# Patient Record
Sex: FEMALE | Race: WHITE | Employment: OTHER | URBAN - NONMETROPOLITAN AREA
[De-identification: names, ages, dates, MRNs, and addresses within clinical notes are randomized per-mention and may not be internally consistent; named-entity substitution may affect disease eponyms.]

---

## 2017-01-11 ENCOUNTER — OFFICE VISIT (OUTPATIENT)
Dept: FAMILY MEDICINE CLINIC | Age: 82
End: 2017-01-11

## 2017-01-11 VITALS
RESPIRATION RATE: 16 BRPM | DIASTOLIC BLOOD PRESSURE: 74 MMHG | HEART RATE: 73 BPM | OXYGEN SATURATION: 96 % | SYSTOLIC BLOOD PRESSURE: 112 MMHG | BODY MASS INDEX: 29.49 KG/M2 | WEIGHT: 151 LBS

## 2017-01-11 DIAGNOSIS — J41.0 SIMPLE CHRONIC BRONCHITIS (HCC): ICD-10-CM

## 2017-01-11 DIAGNOSIS — G62.9 PERIPHERAL POLYNEUROPATHY: Chronic | ICD-10-CM

## 2017-01-11 DIAGNOSIS — M10.072 ACUTE IDIOPATHIC GOUT INVOLVING TOE OF LEFT FOOT: Primary | Chronic | ICD-10-CM

## 2017-01-11 DIAGNOSIS — E03.9 ACQUIRED HYPOTHYROIDISM: Chronic | ICD-10-CM

## 2017-01-11 DIAGNOSIS — I50.32 CHRONIC DIASTOLIC CONGESTIVE HEART FAILURE (HCC): Chronic | ICD-10-CM

## 2017-01-11 DIAGNOSIS — R26.81 UNSTABLE GAIT: ICD-10-CM

## 2017-01-11 DIAGNOSIS — L08.9 INFLAMMATION OF TOE: ICD-10-CM

## 2017-01-11 DIAGNOSIS — I10 ESSENTIAL HYPERTENSION: Chronic | ICD-10-CM

## 2017-01-11 DIAGNOSIS — F33.41 MAJOR DEPRESSIVE DISORDER, RECURRENT EPISODE, IN PARTIAL REMISSION (HCC): Chronic | ICD-10-CM

## 2017-01-11 LAB
A/G RATIO: 1.8 (ref 1.1–2.2)
ALBUMIN SERPL-MCNC: 4.4 G/DL (ref 3.4–5)
ALP BLD-CCNC: 93 U/L (ref 40–129)
ALT SERPL-CCNC: 10 U/L (ref 10–40)
ANION GAP SERPL CALCULATED.3IONS-SCNC: 16 MMOL/L (ref 3–16)
AST SERPL-CCNC: 16 U/L (ref 15–37)
BASOPHILS ABSOLUTE: 0.1 K/UL (ref 0–0.2)
BASOPHILS RELATIVE PERCENT: 0.9 %
BILIRUB SERPL-MCNC: <0.2 MG/DL (ref 0–1)
BUN BLDV-MCNC: 22 MG/DL (ref 7–20)
CALCIUM SERPL-MCNC: 9.7 MG/DL (ref 8.3–10.6)
CHLORIDE BLD-SCNC: 99 MMOL/L (ref 99–110)
CO2: 28 MMOL/L (ref 21–32)
CREAT SERPL-MCNC: 1 MG/DL (ref 0.6–1.2)
EOSINOPHILS ABSOLUTE: 0.2 K/UL (ref 0–0.6)
EOSINOPHILS RELATIVE PERCENT: 3 %
GFR AFRICAN AMERICAN: >60
GFR NON-AFRICAN AMERICAN: 52
GLOBULIN: 2.5 G/DL
GLUCOSE BLD-MCNC: 167 MG/DL (ref 70–99)
HCT VFR BLD CALC: 39.5 % (ref 36–48)
HEMOGLOBIN: 13 G/DL (ref 12–16)
LYMPHOCYTES ABSOLUTE: 1.5 K/UL (ref 1–5.1)
LYMPHOCYTES RELATIVE PERCENT: 22.4 %
MAGNESIUM: 1.7 MG/DL (ref 1.8–2.4)
MCH RBC QN AUTO: 27.8 PG (ref 26–34)
MCHC RBC AUTO-ENTMCNC: 33 G/DL (ref 31–36)
MCV RBC AUTO: 84.2 FL (ref 80–100)
MONOCYTES ABSOLUTE: 0.4 K/UL (ref 0–1.3)
MONOCYTES RELATIVE PERCENT: 6.5 %
NEUTROPHILS ABSOLUTE: 4.4 K/UL (ref 1.7–7.7)
NEUTROPHILS RELATIVE PERCENT: 67.2 %
PDW BLD-RTO: 15.6 % (ref 12.4–15.4)
PLATELET # BLD: 139 K/UL (ref 135–450)
PMV BLD AUTO: 9.8 FL (ref 5–10.5)
POTASSIUM SERPL-SCNC: 4.2 MMOL/L (ref 3.5–5.1)
RBC # BLD: 4.69 M/UL (ref 4–5.2)
SODIUM BLD-SCNC: 143 MMOL/L (ref 136–145)
TOTAL PROTEIN: 6.9 G/DL (ref 6.4–8.2)
TSH SERPL DL<=0.05 MIU/L-ACNC: 0.77 UIU/ML (ref 0.27–4.2)
URIC ACID, SERUM: 7.1 MG/DL (ref 2.6–6)
WBC # BLD: 6.5 K/UL (ref 4–11)

## 2017-01-11 PROCEDURE — 36415 COLL VENOUS BLD VENIPUNCTURE: CPT | Performed by: FAMILY MEDICINE

## 2017-01-11 PROCEDURE — 99214 OFFICE O/P EST MOD 30 MIN: CPT | Performed by: FAMILY MEDICINE

## 2017-01-11 RX ORDER — PREDNISONE 20 MG/1
TABLET ORAL
Qty: 30 TABLET | Refills: 0 | Status: SHIPPED | OUTPATIENT
Start: 2017-01-11 | End: 2017-07-19 | Stop reason: ALTCHOICE

## 2017-01-11 ASSESSMENT — ENCOUNTER SYMPTOMS
DIARRHEA: 0
ABDOMINAL PAIN: 0
BLOOD IN STOOL: 0
SINUS PRESSURE: 0
BACK PAIN: 0
COUGH: 0
CHEST TIGHTNESS: 0
WHEEZING: 0
SHORTNESS OF BREATH: 0
CONSTIPATION: 0
ALLERGIC/IMMUNOLOGIC NEGATIVE: 1
NAUSEA: 0
SORE THROAT: 0
RHINORRHEA: 0
ABDOMINAL DISTENTION: 0
VOMITING: 0

## 2017-01-16 RX ORDER — ALLOPURINOL 100 MG/1
100 TABLET ORAL DAILY
Qty: 30 TABLET | Refills: 3 | Status: SHIPPED | OUTPATIENT
Start: 2017-01-16 | End: 2017-05-31 | Stop reason: SDUPTHER

## 2017-01-18 ENCOUNTER — TELEPHONE (OUTPATIENT)
Dept: FAMILY MEDICINE CLINIC | Age: 82
End: 2017-01-18

## 2017-01-26 ENCOUNTER — TELEPHONE (OUTPATIENT)
Dept: FAMILY MEDICINE CLINIC | Age: 82
End: 2017-01-26

## 2017-01-26 DIAGNOSIS — R60.9 EDEMA, UNSPECIFIED TYPE: Primary | ICD-10-CM

## 2017-02-16 ENCOUNTER — CARE COORDINATION (OUTPATIENT)
Dept: CARE COORDINATION | Age: 82
End: 2017-02-16

## 2017-02-16 DIAGNOSIS — M10.079 IDIOPATHIC GOUT INVOLVING TOE, UNSPECIFIED CHRONICITY, UNSPECIFIED LATERALITY: ICD-10-CM

## 2017-02-16 DIAGNOSIS — M10.079 IDIOPATHIC GOUT INVOLVING TOE, UNSPECIFIED CHRONICITY, UNSPECIFIED LATERALITY: Primary | ICD-10-CM

## 2017-02-16 RX ORDER — METHYLPREDNISOLONE 4 MG/1
TABLET ORAL
Qty: 1 KIT | Refills: 0 | Status: SHIPPED | OUTPATIENT
Start: 2017-02-16 | End: 2017-02-16 | Stop reason: SDUPTHER

## 2017-02-16 RX ORDER — METHYLPREDNISOLONE 4 MG/1
TABLET ORAL
Qty: 1 KIT | Refills: 0 | Status: SHIPPED | OUTPATIENT
Start: 2017-02-16 | End: 2017-07-19 | Stop reason: ALTCHOICE

## 2017-03-01 ENCOUNTER — TELEPHONE (OUTPATIENT)
Dept: FAMILY MEDICINE CLINIC | Age: 82
End: 2017-03-01

## 2017-03-01 RX ORDER — MECLIZINE HCL 12.5 MG/1
TABLET ORAL
Qty: 30 TABLET | Refills: 2 | Status: SHIPPED | OUTPATIENT
Start: 2017-03-01 | End: 2017-08-09 | Stop reason: SDUPTHER

## 2017-03-01 RX ORDER — DOCUSATE SODIUM 100 MG/1
CAPSULE, LIQUID FILLED ORAL
Qty: 60 CAPSULE | Refills: 2 | Status: SHIPPED | OUTPATIENT
Start: 2017-03-01 | End: 2017-07-19 | Stop reason: ALTCHOICE

## 2017-03-07 ENCOUNTER — CARE COORDINATOR VISIT (OUTPATIENT)
Dept: CARE COORDINATION | Age: 82
End: 2017-03-07

## 2017-03-07 ENCOUNTER — OFFICE VISIT (OUTPATIENT)
Dept: FAMILY MEDICINE CLINIC | Age: 82
End: 2017-03-07

## 2017-03-07 VITALS
SYSTOLIC BLOOD PRESSURE: 122 MMHG | WEIGHT: 150 LBS | HEART RATE: 130 BPM | BODY MASS INDEX: 29.29 KG/M2 | DIASTOLIC BLOOD PRESSURE: 74 MMHG | RESPIRATION RATE: 18 BRPM

## 2017-03-07 DIAGNOSIS — J41.0 SIMPLE CHRONIC BRONCHITIS (HCC): ICD-10-CM

## 2017-03-07 DIAGNOSIS — G62.9 PERIPHERAL POLYNEUROPATHY: Chronic | ICD-10-CM

## 2017-03-07 DIAGNOSIS — F33.41 MAJOR DEPRESSIVE DISORDER, RECURRENT EPISODE, IN PARTIAL REMISSION (HCC): Chronic | ICD-10-CM

## 2017-03-07 DIAGNOSIS — S72.25XD CLOSED NONDISPLACED SUBTROCHANTERIC FRACTURE OF LEFT FEMUR WITH ROUTINE HEALING, SUBSEQUENT ENCOUNTER: ICD-10-CM

## 2017-03-07 DIAGNOSIS — K59.01 SLOW TRANSIT CONSTIPATION: ICD-10-CM

## 2017-03-07 DIAGNOSIS — E03.9 ACQUIRED HYPOTHYROIDISM: Chronic | ICD-10-CM

## 2017-03-07 DIAGNOSIS — R26.81 UNSTABLE GAIT: ICD-10-CM

## 2017-03-07 DIAGNOSIS — I50.32 CHRONIC DIASTOLIC CONGESTIVE HEART FAILURE (HCC): Chronic | ICD-10-CM

## 2017-03-07 PROCEDURE — G8420 CALC BMI NORM PARAMETERS: HCPCS | Performed by: FAMILY MEDICINE

## 2017-03-07 PROCEDURE — 3023F SPIROM DOC REV: CPT | Performed by: FAMILY MEDICINE

## 2017-03-07 PROCEDURE — 4040F PNEUMOC VAC/ADMIN/RCVD: CPT | Performed by: FAMILY MEDICINE

## 2017-03-07 PROCEDURE — 1123F ACP DISCUSS/DSCN MKR DOCD: CPT | Performed by: FAMILY MEDICINE

## 2017-03-07 PROCEDURE — 1036F TOBACCO NON-USER: CPT | Performed by: FAMILY MEDICINE

## 2017-03-07 PROCEDURE — 99214 OFFICE O/P EST MOD 30 MIN: CPT | Performed by: FAMILY MEDICINE

## 2017-03-07 PROCEDURE — G8484 FLU IMMUNIZE NO ADMIN: HCPCS | Performed by: FAMILY MEDICINE

## 2017-03-07 PROCEDURE — G8427 DOCREV CUR MEDS BY ELIG CLIN: HCPCS | Performed by: FAMILY MEDICINE

## 2017-03-07 PROCEDURE — G8926 SPIRO NO PERF OR DOC: HCPCS | Performed by: FAMILY MEDICINE

## 2017-03-07 PROCEDURE — 1090F PRES/ABSN URINE INCON ASSESS: CPT | Performed by: FAMILY MEDICINE

## 2017-03-07 ASSESSMENT — ENCOUNTER SYMPTOMS
ALLERGIC/IMMUNOLOGIC NEGATIVE: 1
ABDOMINAL PAIN: 0
WHEEZING: 0
CONSTIPATION: 0
COUGH: 0
SINUS PRESSURE: 0
SHORTNESS OF BREATH: 0
VOMITING: 0
NAUSEA: 0
BACK PAIN: 0
BLOOD IN STOOL: 0
RHINORRHEA: 0
CHEST TIGHTNESS: 0
SORE THROAT: 0
ABDOMINAL DISTENTION: 0
DIARRHEA: 0

## 2017-03-22 RX ORDER — OMEPRAZOLE 20 MG/1
CAPSULE, DELAYED RELEASE ORAL
Qty: 30 CAPSULE | Refills: 11 | Status: SHIPPED | OUTPATIENT
Start: 2017-03-22 | End: 2018-04-11 | Stop reason: SDUPTHER

## 2017-03-22 RX ORDER — INDAPAMIDE 2.5 MG/1
TABLET, FILM COATED ORAL
Qty: 90 TABLET | Refills: 1 | Status: SHIPPED | OUTPATIENT
Start: 2017-03-22 | End: 2017-12-27 | Stop reason: SDUPTHER

## 2017-03-22 RX ORDER — OMEPRAZOLE 20 MG/1
CAPSULE, DELAYED RELEASE ORAL
Qty: 30 CAPSULE | Refills: 5 | Status: SHIPPED | OUTPATIENT
Start: 2017-03-22 | End: 2017-07-19 | Stop reason: ALTCHOICE

## 2017-04-05 RX ORDER — LOSARTAN POTASSIUM 50 MG/1
TABLET ORAL
Qty: 30 TABLET | Refills: 11 | Status: SHIPPED | OUTPATIENT
Start: 2017-04-05 | End: 2018-05-02 | Stop reason: SDUPTHER

## 2017-05-03 RX ORDER — POTASSIUM CHLORIDE 750 MG/1
CAPSULE, EXTENDED RELEASE ORAL
Qty: 30 CAPSULE | Refills: 5 | Status: SHIPPED | OUTPATIENT
Start: 2017-05-03 | End: 2017-10-25 | Stop reason: SDUPTHER

## 2017-05-03 RX ORDER — TRAZODONE HYDROCHLORIDE 50 MG/1
TABLET ORAL
Qty: 30 TABLET | Refills: 11 | Status: SHIPPED | OUTPATIENT
Start: 2017-05-03 | End: 2018-05-02 | Stop reason: SDUPTHER

## 2017-05-22 ENCOUNTER — OFFICE VISIT (OUTPATIENT)
Dept: FAMILY MEDICINE CLINIC | Age: 82
End: 2017-05-22

## 2017-05-22 ENCOUNTER — CARE COORDINATOR VISIT (OUTPATIENT)
Dept: CARE COORDINATION | Age: 82
End: 2017-05-22

## 2017-05-22 VITALS
HEIGHT: 60 IN | WEIGHT: 155 LBS | SYSTOLIC BLOOD PRESSURE: 128 MMHG | BODY MASS INDEX: 30.43 KG/M2 | HEART RATE: 68 BPM | RESPIRATION RATE: 15 BRPM | DIASTOLIC BLOOD PRESSURE: 74 MMHG

## 2017-05-22 DIAGNOSIS — Z23 NEED FOR PROPHYLACTIC VACCINATION AND INOCULATION AGAINST VARICELLA: ICD-10-CM

## 2017-05-22 DIAGNOSIS — L73.9 FOLLICULITIS: Primary | ICD-10-CM

## 2017-05-22 DIAGNOSIS — Z23 NEED FOR PROPHYLACTIC VACCINATION AGAINST DIPHTHERIA-TETANUS-PERTUSSIS (DTP): ICD-10-CM

## 2017-05-22 PROCEDURE — 99213 OFFICE O/P EST LOW 20 MIN: CPT | Performed by: FAMILY MEDICINE

## 2017-05-22 RX ORDER — SULFAMETHOXAZOLE AND TRIMETHOPRIM 800; 160 MG/1; MG/1
1 TABLET ORAL 2 TIMES DAILY
Qty: 10 TABLET | Refills: 0 | Status: SHIPPED | OUTPATIENT
Start: 2017-05-22 | End: 2017-05-27

## 2017-05-22 ASSESSMENT — PATIENT HEALTH QUESTIONNAIRE - PHQ9
1. LITTLE INTEREST OR PLEASURE IN DOING THINGS: 0
2. FEELING DOWN, DEPRESSED OR HOPELESS: 0
SUM OF ALL RESPONSES TO PHQ9 QUESTIONS 1 & 2: 0
SUM OF ALL RESPONSES TO PHQ QUESTIONS 1-9: 0

## 2017-05-30 ENCOUNTER — OFFICE VISIT (OUTPATIENT)
Dept: FAMILY MEDICINE CLINIC | Age: 82
End: 2017-05-30

## 2017-05-30 VITALS
DIASTOLIC BLOOD PRESSURE: 80 MMHG | BODY MASS INDEX: 30.43 KG/M2 | HEART RATE: 60 BPM | RESPIRATION RATE: 18 BRPM | WEIGHT: 155 LBS | HEIGHT: 60 IN | OXYGEN SATURATION: 90 % | SYSTOLIC BLOOD PRESSURE: 122 MMHG

## 2017-05-30 DIAGNOSIS — L73.9 FOLLICULITIS: Primary | ICD-10-CM

## 2017-05-30 DIAGNOSIS — Z23 NEED FOR PROPHYLACTIC VACCINATION AGAINST DIPHTHERIA-TETANUS-PERTUSSIS (DTP): ICD-10-CM

## 2017-05-30 DIAGNOSIS — F43.21 GRIEF: ICD-10-CM

## 2017-05-30 DIAGNOSIS — Z23 NEED FOR PROPHYLACTIC VACCINATION AND INOCULATION AGAINST VARICELLA: ICD-10-CM

## 2017-05-30 PROCEDURE — 99214 OFFICE O/P EST MOD 30 MIN: CPT | Performed by: FAMILY MEDICINE

## 2017-05-30 ASSESSMENT — ENCOUNTER SYMPTOMS
CHEST TIGHTNESS: 0
BACK PAIN: 0
NAUSEA: 0
SORE THROAT: 0
CONSTIPATION: 0
DIARRHEA: 0
WHEEZING: 0
COUGH: 0
ABDOMINAL DISTENTION: 0
SINUS PRESSURE: 0
VOMITING: 0
BLOOD IN STOOL: 0
SHORTNESS OF BREATH: 0
RHINORRHEA: 0
ABDOMINAL PAIN: 0
ALLERGIC/IMMUNOLOGIC NEGATIVE: 1

## 2017-05-31 RX ORDER — ALLOPURINOL 100 MG/1
TABLET ORAL
Qty: 30 TABLET | Refills: 3 | Status: SHIPPED | OUTPATIENT
Start: 2017-05-31 | End: 2017-10-04 | Stop reason: SDUPTHER

## 2017-06-07 RX ORDER — LOVASTATIN 40 MG/1
TABLET ORAL
Qty: 90 TABLET | Refills: 3 | Status: SHIPPED | OUTPATIENT
Start: 2017-06-07 | End: 2018-06-13 | Stop reason: SDUPTHER

## 2017-06-21 RX ORDER — TRAMADOL HYDROCHLORIDE 50 MG/1
TABLET ORAL
Qty: 30 TABLET | Refills: 1 | Status: SHIPPED | OUTPATIENT
Start: 2017-06-21 | End: 2018-03-01 | Stop reason: CLARIF

## 2017-06-27 ENCOUNTER — OFFICE VISIT (OUTPATIENT)
Dept: FAMILY MEDICINE CLINIC | Age: 82
End: 2017-06-27

## 2017-06-27 VITALS
OXYGEN SATURATION: 97 % | BODY MASS INDEX: 30.43 KG/M2 | WEIGHT: 155 LBS | RESPIRATION RATE: 20 BRPM | HEIGHT: 60 IN | SYSTOLIC BLOOD PRESSURE: 120 MMHG | DIASTOLIC BLOOD PRESSURE: 82 MMHG | HEART RATE: 85 BPM

## 2017-06-27 DIAGNOSIS — R05.9 COUGH: ICD-10-CM

## 2017-06-27 DIAGNOSIS — L73.9 FOLLICULITIS: ICD-10-CM

## 2017-06-27 DIAGNOSIS — H65.03 BILATERAL ACUTE SEROUS OTITIS MEDIA, RECURRENCE NOT SPECIFIED: Primary | ICD-10-CM

## 2017-06-27 PROCEDURE — 99213 OFFICE O/P EST LOW 20 MIN: CPT | Performed by: FAMILY MEDICINE

## 2017-06-27 RX ORDER — MONTELUKAST SODIUM 10 MG/1
10 TABLET ORAL NIGHTLY
Qty: 30 TABLET | Refills: 3 | Status: SHIPPED | OUTPATIENT
Start: 2017-06-27 | End: 2017-10-18 | Stop reason: SDUPTHER

## 2017-06-27 ASSESSMENT — PATIENT HEALTH QUESTIONNAIRE - PHQ9
SUM OF ALL RESPONSES TO PHQ9 QUESTIONS 1 & 2: 0
1. LITTLE INTEREST OR PLEASURE IN DOING THINGS: 0
SUM OF ALL RESPONSES TO PHQ QUESTIONS 1-9: 0
2. FEELING DOWN, DEPRESSED OR HOPELESS: 0

## 2017-06-28 ENCOUNTER — TELEPHONE (OUTPATIENT)
Dept: FAMILY MEDICINE CLINIC | Age: 82
End: 2017-06-28

## 2017-06-28 DIAGNOSIS — R21 RASH: Primary | ICD-10-CM

## 2017-06-28 RX ORDER — MENTHOL AND ZINC OXIDE .44; 20.625 G/100G; G/100G
OINTMENT TOPICAL DAILY
Qty: 1 TUBE | Refills: 0 | Status: SHIPPED | OUTPATIENT
Start: 2017-06-28 | End: 2017-07-05

## 2017-07-03 PROBLEM — L73.9 FOLLICULITIS: Status: RESOLVED | Noted: 2017-05-30 | Resolved: 2017-07-03

## 2017-07-03 ASSESSMENT — ENCOUNTER SYMPTOMS
SINUS PRESSURE: 0
SORE THROAT: 0
CONSTIPATION: 0
CHEST TIGHTNESS: 0
DIARRHEA: 0
ALLERGIC/IMMUNOLOGIC NEGATIVE: 1
WHEEZING: 0
NAUSEA: 0
ABDOMINAL DISTENTION: 0
BACK PAIN: 0
SHORTNESS OF BREATH: 0
ABDOMINAL PAIN: 0
COUGH: 0
RHINORRHEA: 0
VOMITING: 0
BLOOD IN STOOL: 0

## 2017-07-18 ENCOUNTER — CARE COORDINATION (OUTPATIENT)
Dept: CARE COORDINATION | Age: 82
End: 2017-07-18

## 2017-07-19 ENCOUNTER — OFFICE VISIT (OUTPATIENT)
Dept: FAMILY MEDICINE CLINIC | Age: 82
End: 2017-07-19

## 2017-07-19 VITALS
HEIGHT: 60 IN | BODY MASS INDEX: 30.23 KG/M2 | HEART RATE: 69 BPM | DIASTOLIC BLOOD PRESSURE: 74 MMHG | OXYGEN SATURATION: 94 % | SYSTOLIC BLOOD PRESSURE: 112 MMHG | WEIGHT: 154 LBS | RESPIRATION RATE: 20 BRPM

## 2017-07-19 DIAGNOSIS — E79.0 HYPERURICEMIA: ICD-10-CM

## 2017-07-19 DIAGNOSIS — I50.32 CHRONIC DIASTOLIC CONGESTIVE HEART FAILURE (HCC): Chronic | ICD-10-CM

## 2017-07-19 DIAGNOSIS — E03.9 ACQUIRED HYPOTHYROIDISM: Chronic | ICD-10-CM

## 2017-07-19 DIAGNOSIS — N18.30 CKD (CHRONIC KIDNEY DISEASE), STAGE III (HCC): ICD-10-CM

## 2017-07-19 DIAGNOSIS — R06.02 SHORTNESS OF BREATH: ICD-10-CM

## 2017-07-19 DIAGNOSIS — R60.9 DEPENDENT EDEMA: ICD-10-CM

## 2017-07-19 DIAGNOSIS — R53.83 FATIGUE, UNSPECIFIED TYPE: Primary | ICD-10-CM

## 2017-07-19 DIAGNOSIS — I10 ESSENTIAL HYPERTENSION: Chronic | ICD-10-CM

## 2017-07-19 DIAGNOSIS — R53.83 FATIGUE, UNSPECIFIED TYPE: ICD-10-CM

## 2017-07-19 DIAGNOSIS — R14.0 BLOATED ABDOMEN: ICD-10-CM

## 2017-07-19 PROBLEM — F43.21 GRIEF: Status: RESOLVED | Noted: 2017-05-30 | Resolved: 2017-07-19

## 2017-07-19 PROBLEM — R05.9 COUGH: Status: RESOLVED | Noted: 2017-06-27 | Resolved: 2017-07-19

## 2017-07-19 PROBLEM — H65.03 BILATERAL ACUTE SEROUS OTITIS MEDIA: Status: RESOLVED | Noted: 2017-06-27 | Resolved: 2017-07-19

## 2017-07-19 LAB
ALBUMIN SERPL-MCNC: 4.5 G/DL (ref 3.4–5)
ALP BLD-CCNC: 75 U/L (ref 40–129)
ALT SERPL-CCNC: 16 U/L (ref 10–40)
ANION GAP SERPL CALCULATED.3IONS-SCNC: 18 MMOL/L (ref 3–16)
AST SERPL-CCNC: 23 U/L (ref 15–37)
BASOPHILS ABSOLUTE: 0.1 K/UL (ref 0–0.2)
BASOPHILS RELATIVE PERCENT: 0.9 %
BILIRUB SERPL-MCNC: 0.3 MG/DL (ref 0–1)
BILIRUBIN DIRECT: <0.2 MG/DL (ref 0–0.3)
BILIRUBIN, INDIRECT: NORMAL MG/DL (ref 0–1)
BUN BLDV-MCNC: 21 MG/DL (ref 7–20)
CALCIUM SERPL-MCNC: 9.4 MG/DL (ref 8.3–10.6)
CHLORIDE BLD-SCNC: 96 MMOL/L (ref 99–110)
CO2: 25 MMOL/L (ref 21–32)
CREAT SERPL-MCNC: 0.9 MG/DL (ref 0.6–1.2)
EOSINOPHILS ABSOLUTE: 0.2 K/UL (ref 0–0.6)
EOSINOPHILS RELATIVE PERCENT: 3.2 %
GFR AFRICAN AMERICAN: >60
GFR NON-AFRICAN AMERICAN: 59
GLUCOSE BLD-MCNC: 224 MG/DL (ref 70–99)
HCT VFR BLD CALC: 37.6 % (ref 36–48)
HEMOGLOBIN: 12.7 G/DL (ref 12–16)
LYMPHOCYTES ABSOLUTE: 1.2 K/UL (ref 1–5.1)
LYMPHOCYTES RELATIVE PERCENT: 18.5 %
MAGNESIUM: 1.9 MG/DL (ref 1.8–2.4)
MCH RBC QN AUTO: 31 PG (ref 26–34)
MCHC RBC AUTO-ENTMCNC: 33.8 G/DL (ref 31–36)
MCV RBC AUTO: 91.7 FL (ref 80–100)
MONOCYTES ABSOLUTE: 0.4 K/UL (ref 0–1.3)
MONOCYTES RELATIVE PERCENT: 6.3 %
NEUTROPHILS ABSOLUTE: 4.7 K/UL (ref 1.7–7.7)
NEUTROPHILS RELATIVE PERCENT: 71.1 %
PDW BLD-RTO: 14 % (ref 12.4–15.4)
PLATELET # BLD: 132 K/UL (ref 135–450)
PMV BLD AUTO: 9.6 FL (ref 5–10.5)
POTASSIUM SERPL-SCNC: 4.4 MMOL/L (ref 3.5–5.1)
PRO-BNP: 94 PG/ML (ref 0–449)
RBC # BLD: 4.1 M/UL (ref 4–5.2)
SODIUM BLD-SCNC: 139 MMOL/L (ref 136–145)
TOTAL PROTEIN: 7.1 G/DL (ref 6.4–8.2)
TSH SERPL DL<=0.05 MIU/L-ACNC: 1.93 UIU/ML (ref 0.27–4.2)
URIC ACID, SERUM: 6.3 MG/DL (ref 2.6–6)
WBC # BLD: 6.5 K/UL (ref 4–11)

## 2017-07-19 PROCEDURE — 99214 OFFICE O/P EST MOD 30 MIN: CPT | Performed by: FAMILY MEDICINE

## 2017-07-19 RX ORDER — DOCUSATE SODIUM 100 MG/1
CAPSULE ORAL
Qty: 60 CAPSULE | Refills: 2 | Status: SHIPPED | OUTPATIENT
Start: 2017-07-19 | End: 2017-11-15 | Stop reason: SDUPTHER

## 2017-07-19 RX ORDER — FAMOTIDINE 40 MG/1
TABLET, FILM COATED ORAL
Qty: 30 TABLET | Refills: 5 | Status: SHIPPED | OUTPATIENT
Start: 2017-07-19 | End: 2018-01-02 | Stop reason: SDUPTHER

## 2017-07-26 RX ORDER — LEVOTHYROXINE SODIUM 0.1 MG/1
TABLET ORAL
Qty: 30 TABLET | Refills: 11 | Status: SHIPPED | OUTPATIENT
Start: 2017-07-26 | End: 2018-07-12 | Stop reason: SDUPTHER

## 2017-07-29 ASSESSMENT — ENCOUNTER SYMPTOMS
SORE THROAT: 0
VOMITING: 0
NAUSEA: 0
CONSTIPATION: 0
ABDOMINAL PAIN: 0
RHINORRHEA: 0
COUGH: 0
BACK PAIN: 0
CHEST TIGHTNESS: 0
ALLERGIC/IMMUNOLOGIC NEGATIVE: 1
DIARRHEA: 0
BLOOD IN STOOL: 0
ABDOMINAL DISTENTION: 0
WHEEZING: 0
SHORTNESS OF BREATH: 0
SINUS PRESSURE: 0

## 2017-08-09 ENCOUNTER — TELEPHONE (OUTPATIENT)
Dept: FAMILY MEDICINE CLINIC | Age: 82
End: 2017-08-09

## 2017-08-10 RX ORDER — MECLIZINE HCL 12.5 MG/1
TABLET ORAL
Qty: 30 TABLET | Refills: 2 | Status: SHIPPED | OUTPATIENT
Start: 2017-08-10 | End: 2017-11-29 | Stop reason: SDUPTHER

## 2017-08-16 RX ORDER — VENLAFAXINE HYDROCHLORIDE 75 MG/1
CAPSULE, EXTENDED RELEASE ORAL
Qty: 30 CAPSULE | Refills: 11 | Status: SHIPPED | OUTPATIENT
Start: 2017-08-16 | End: 2018-07-12 | Stop reason: SDUPTHER

## 2017-08-23 ENCOUNTER — CARE COORDINATION (OUTPATIENT)
Dept: CARE COORDINATION | Age: 82
End: 2017-08-23

## 2017-09-27 ENCOUNTER — OFFICE VISIT (OUTPATIENT)
Dept: FAMILY MEDICINE CLINIC | Age: 82
End: 2017-09-27

## 2017-09-27 VITALS
HEIGHT: 60 IN | RESPIRATION RATE: 17 BRPM | HEART RATE: 72 BPM | WEIGHT: 157.2 LBS | OXYGEN SATURATION: 97 % | DIASTOLIC BLOOD PRESSURE: 76 MMHG | SYSTOLIC BLOOD PRESSURE: 136 MMHG | BODY MASS INDEX: 30.86 KG/M2

## 2017-09-27 DIAGNOSIS — G44.89 ALLERGIC HEADACHE: ICD-10-CM

## 2017-09-27 DIAGNOSIS — Z00.00 ROUTINE GENERAL MEDICAL EXAMINATION AT A HEALTH CARE FACILITY: Primary | ICD-10-CM

## 2017-09-27 DIAGNOSIS — Z23 NEEDS FLU SHOT: ICD-10-CM

## 2017-09-27 PROCEDURE — G0008 ADMIN INFLUENZA VIRUS VAC: HCPCS | Performed by: FAMILY MEDICINE

## 2017-09-27 PROCEDURE — 90686 IIV4 VACC NO PRSV 0.5 ML IM: CPT | Performed by: FAMILY MEDICINE

## 2017-09-27 PROCEDURE — G0438 PPPS, INITIAL VISIT: HCPCS | Performed by: FAMILY MEDICINE

## 2017-09-27 RX ORDER — PREDNISONE 20 MG/1
20 TABLET ORAL 2 TIMES DAILY
Qty: 10 TABLET | Refills: 0 | Status: SHIPPED | OUTPATIENT
Start: 2017-09-27 | End: 2017-10-02

## 2017-09-27 ASSESSMENT — PATIENT HEALTH QUESTIONNAIRE - PHQ9
SUM OF ALL RESPONSES TO PHQ QUESTIONS 1-9: 2
2. FEELING DOWN, DEPRESSED OR HOPELESS: 1
SUM OF ALL RESPONSES TO PHQ9 QUESTIONS 1 & 2: 2
1. LITTLE INTEREST OR PLEASURE IN DOING THINGS: 1

## 2017-09-27 ASSESSMENT — ANXIETY QUESTIONNAIRES: GAD7 TOTAL SCORE: 4

## 2017-09-27 ASSESSMENT — LIFESTYLE VARIABLES: HOW OFTEN DO YOU HAVE A DRINK CONTAINING ALCOHOL: 0

## 2017-10-04 RX ORDER — ALLOPURINOL 100 MG/1
TABLET ORAL
Qty: 30 TABLET | Refills: 3 | Status: SHIPPED | OUTPATIENT
Start: 2017-10-04 | End: 2018-01-02 | Stop reason: SDUPTHER

## 2017-10-10 ENCOUNTER — CARE COORDINATION (OUTPATIENT)
Dept: CARE COORDINATION | Age: 82
End: 2017-10-10

## 2017-10-12 ENCOUNTER — CARE COORDINATION (OUTPATIENT)
Dept: CARE COORDINATION | Age: 82
End: 2017-10-12

## 2017-10-13 ENCOUNTER — OFFICE VISIT (OUTPATIENT)
Dept: FAMILY MEDICINE CLINIC | Age: 82
End: 2017-10-13

## 2017-10-13 VITALS
HEART RATE: 72 BPM | OXYGEN SATURATION: 97 % | DIASTOLIC BLOOD PRESSURE: 70 MMHG | WEIGHT: 155 LBS | RESPIRATION RATE: 15 BRPM | SYSTOLIC BLOOD PRESSURE: 118 MMHG | BODY MASS INDEX: 30.27 KG/M2

## 2017-10-13 DIAGNOSIS — M18.12 PRIMARY OSTEOARTHRITIS OF FIRST CARPOMETACARPAL JOINT OF LEFT HAND: ICD-10-CM

## 2017-10-13 DIAGNOSIS — B37.2 MONILIAL RASH: Primary | ICD-10-CM

## 2017-10-13 PROCEDURE — 99213 OFFICE O/P EST LOW 20 MIN: CPT | Performed by: FAMILY MEDICINE

## 2017-10-13 RX ORDER — FLUCONAZOLE 150 MG/1
150 TABLET ORAL DAILY
Qty: 5 TABLET | Refills: 0 | Status: SHIPPED | OUTPATIENT
Start: 2017-10-13 | End: 2017-10-18

## 2017-10-13 RX ORDER — NYSTATIN 100000 U/G
OINTMENT TOPICAL
Qty: 60 G | Refills: 1 | Status: SHIPPED | OUTPATIENT
Start: 2017-10-13 | End: 2018-06-30

## 2017-10-18 DIAGNOSIS — H65.03 BILATERAL ACUTE SEROUS OTITIS MEDIA, RECURRENCE NOT SPECIFIED: ICD-10-CM

## 2017-10-18 DIAGNOSIS — R05.9 COUGH: ICD-10-CM

## 2017-10-18 RX ORDER — MONTELUKAST SODIUM 10 MG/1
10 TABLET ORAL NIGHTLY
Qty: 30 TABLET | Refills: 3 | Status: SHIPPED | OUTPATIENT
Start: 2017-10-18 | End: 2018-01-02 | Stop reason: SDUPTHER

## 2017-11-15 RX ORDER — DOCUSATE SODIUM 100 MG/1
CAPSULE ORAL
Qty: 60 CAPSULE | Refills: 3 | Status: SHIPPED | OUTPATIENT
Start: 2017-11-15 | End: 2018-05-16 | Stop reason: SDUPTHER

## 2017-11-29 RX ORDER — MECLIZINE HCL 12.5 MG/1
TABLET ORAL
Qty: 30 TABLET | Refills: 2 | Status: SHIPPED | OUTPATIENT
Start: 2017-11-29 | End: 2018-03-28 | Stop reason: SDUPTHER

## 2017-12-08 ENCOUNTER — CARE COORDINATION (OUTPATIENT)
Dept: CARE COORDINATION | Age: 82
End: 2017-12-08

## 2017-12-08 NOTE — CARE COORDINATION
TABLET BY MOUTH DAILY 10/4/17   Daisy Verma MD   venlafaxine (EFFEXOR XR) 75 MG extended release capsule TAKE 1 CAPSULE BY MOUTH DAILY. 8/16/17   Daisy Verma MD   levothyroxine (SYNTHROID) 100 MCG tablet TAKE 1 TABLET BY MOUTH IN THE MORNING ON AN EMPTY STOMACH. 7/26/17   Daisy Verma MD   famotidine (PEPCID) 40 MG tablet TAKE ONE TABLET BY MOUTH AT NIGHT 7/19/17   Daisy Verma MD   traMADol (ULTRAM) 50 MG tablet TAKE ONE TABLET EVERY 6 HOURS AS NEEDED FOR PAIN 6/21/17   Daisy Verma MD   lovastatin (MEVACOR) 40 MG tablet TAKE ONE TABLET DAILY 6/7/17   Daisy Verma MD   traZODone (DESYREL) 50 MG tablet TAKE 1 TABLET BY MOUTH NIGHTLY. 5/3/17   Daisy Verma MD   losartan (COZAAR) 50 MG tablet TAKE 1 TABLET BY MOUTH DAILY 4/5/17   Daisy Verma MD   omeprazole (PRILOSEC) 20 MG delayed release capsule TAKE ONE CAPSULE BY MOUTH EVERY DAY 3/22/17   Daisy Verma MD   indapamide (LOZOL) 2.5 MG tablet TAKE 1 TABLET BY MOUTH DAILY. 3/22/17   Daisy Verma MD   polyethylene glycol [facility]) powder Take 17 g by mouth daily 3/1/16   Daisy Verma MD   acetaminophen (TYLENOL) 650 MG CR tablet Take 1 tablet by mouth every 8 hours as needed for Pain 3/1/16   Daisy Verma MD   albuterol sulfate HFA (VENTOLIN HFA) 108 (90 BASE) MCG/ACT inhaler Inhale 2 puffs into the lungs every 6 hours as needed for Wheezing 2/16/16   Daisy Verma MD   Misc. Devices (TUB TRANSFER BOARD) MISC Use as directed 11/11/15   Daisy Verma MD   aspirin EC 81 MG EC tablet Take 1 tablet by mouth daily.  9/16/14   Daisy Verma MD       Future Appointments  Date Time Provider Ancelmo Serrano   1/2/2018 1:30 PM Daisy Verma MD [facility]   ,   Congestive Heart Failure Assessment    Are you currently restricting fluids?:  No Restriction  Do you understand a low sodium diet?:  Yes  Do you understand how to read food labels?:  Yes  Do you salt your food before tasting it?:  No Symptoms:          and   General Assessment    Do you have any symptoms that are causing concern?:  No

## 2017-12-27 RX ORDER — INDAPAMIDE 2.5 MG/1
TABLET, FILM COATED ORAL
Qty: 90 TABLET | Refills: 2 | Status: ON HOLD | OUTPATIENT
Start: 2017-12-27 | End: 2018-08-26 | Stop reason: HOSPADM

## 2018-01-02 ENCOUNTER — OFFICE VISIT (OUTPATIENT)
Dept: FAMILY MEDICINE CLINIC | Age: 83
End: 2018-01-02

## 2018-01-02 VITALS
DIASTOLIC BLOOD PRESSURE: 80 MMHG | SYSTOLIC BLOOD PRESSURE: 110 MMHG | WEIGHT: 158.6 LBS | RESPIRATION RATE: 15 BRPM | OXYGEN SATURATION: 98 % | HEART RATE: 74 BPM | BODY MASS INDEX: 30.97 KG/M2

## 2018-01-02 DIAGNOSIS — R42 LIGHT HEADED: ICD-10-CM

## 2018-01-02 DIAGNOSIS — E79.0 HYPERURICEMIA: ICD-10-CM

## 2018-01-02 DIAGNOSIS — Z87.39 HISTORY OF GOUT: ICD-10-CM

## 2018-01-02 DIAGNOSIS — H65.03 BILATERAL ACUTE SEROUS OTITIS MEDIA, RECURRENCE NOT SPECIFIED: ICD-10-CM

## 2018-01-02 DIAGNOSIS — I10 ESSENTIAL HYPERTENSION: Chronic | ICD-10-CM

## 2018-01-02 DIAGNOSIS — E03.9 ACQUIRED HYPOTHYROIDISM: Chronic | ICD-10-CM

## 2018-01-02 DIAGNOSIS — I10 ESSENTIAL HYPERTENSION: Primary | Chronic | ICD-10-CM

## 2018-01-02 DIAGNOSIS — F33.41 MAJOR DEPRESSIVE DISORDER, RECURRENT EPISODE, IN PARTIAL REMISSION (HCC): Chronic | ICD-10-CM

## 2018-01-02 DIAGNOSIS — I50.32 CHRONIC DIASTOLIC CONGESTIVE HEART FAILURE (HCC): Chronic | ICD-10-CM

## 2018-01-02 DIAGNOSIS — J20.9 ACUTE EXACERBATION OF CHRONIC BRONCHITIS (HCC): ICD-10-CM

## 2018-01-02 DIAGNOSIS — N18.30 CKD (CHRONIC KIDNEY DISEASE), STAGE III (HCC): ICD-10-CM

## 2018-01-02 DIAGNOSIS — G62.9 PERIPHERAL POLYNEUROPATHY: Chronic | ICD-10-CM

## 2018-01-02 DIAGNOSIS — R05.9 COUGH: ICD-10-CM

## 2018-01-02 DIAGNOSIS — J42 ACUTE EXACERBATION OF CHRONIC BRONCHITIS (HCC): ICD-10-CM

## 2018-01-02 DIAGNOSIS — J41.0 SIMPLE CHRONIC BRONCHITIS (HCC): ICD-10-CM

## 2018-01-02 DIAGNOSIS — R26.81 UNSTABLE GAIT: ICD-10-CM

## 2018-01-02 DIAGNOSIS — R14.0 ABDOMINAL DISTENSION: ICD-10-CM

## 2018-01-02 PROBLEM — R06.02 SHORTNESS OF BREATH: Status: RESOLVED | Noted: 2017-07-19 | Resolved: 2018-01-02

## 2018-01-02 PROBLEM — B37.2 MONILIAL RASH: Status: RESOLVED | Noted: 2017-10-13 | Resolved: 2018-01-02

## 2018-01-02 LAB
A/G RATIO: 1.9 (ref 1.1–2.2)
ALBUMIN SERPL-MCNC: 4.6 G/DL (ref 3.4–5)
ALP BLD-CCNC: 71 U/L (ref 40–129)
ALT SERPL-CCNC: 13 U/L (ref 10–40)
ANION GAP SERPL CALCULATED.3IONS-SCNC: 14 MMOL/L (ref 3–16)
AST SERPL-CCNC: 19 U/L (ref 15–37)
BASOPHILS ABSOLUTE: 0.1 K/UL (ref 0–0.2)
BASOPHILS RELATIVE PERCENT: 0.9 %
BILIRUB SERPL-MCNC: 0.3 MG/DL (ref 0–1)
BUN BLDV-MCNC: 28 MG/DL (ref 7–20)
CALCIUM SERPL-MCNC: 9.5 MG/DL (ref 8.3–10.6)
CHLORIDE BLD-SCNC: 96 MMOL/L (ref 99–110)
CO2: 24 MMOL/L (ref 21–32)
CREAT SERPL-MCNC: 0.9 MG/DL (ref 0.6–1.2)
EOSINOPHILS ABSOLUTE: 0.3 K/UL (ref 0–0.6)
EOSINOPHILS RELATIVE PERCENT: 4.3 %
GFR AFRICAN AMERICAN: >60
GFR NON-AFRICAN AMERICAN: 59
GLOBULIN: 2.4 G/DL
GLUCOSE BLD-MCNC: 142 MG/DL (ref 70–99)
HCT VFR BLD CALC: 35.5 % (ref 36–48)
HEMOGLOBIN: 12.1 G/DL (ref 12–16)
LYMPHOCYTES ABSOLUTE: 1.5 K/UL (ref 1–5.1)
LYMPHOCYTES RELATIVE PERCENT: 23.6 %
MAGNESIUM: 1.7 MG/DL (ref 1.8–2.4)
MCH RBC QN AUTO: 30.7 PG (ref 26–34)
MCHC RBC AUTO-ENTMCNC: 34.1 G/DL (ref 31–36)
MCV RBC AUTO: 89.9 FL (ref 80–100)
MONOCYTES ABSOLUTE: 0.4 K/UL (ref 0–1.3)
MONOCYTES RELATIVE PERCENT: 7 %
NEUTROPHILS ABSOLUTE: 4 K/UL (ref 1.7–7.7)
NEUTROPHILS RELATIVE PERCENT: 64.2 %
PDW BLD-RTO: 13.9 % (ref 12.4–15.4)
PLATELET # BLD: 145 K/UL (ref 135–450)
PMV BLD AUTO: 9.8 FL (ref 5–10.5)
POTASSIUM SERPL-SCNC: 4 MMOL/L (ref 3.5–5.1)
RBC # BLD: 3.95 M/UL (ref 4–5.2)
SODIUM BLD-SCNC: 134 MMOL/L (ref 136–145)
TOTAL PROTEIN: 7 G/DL (ref 6.4–8.2)
URIC ACID, SERUM: 6.6 MG/DL (ref 2.6–6)
WBC # BLD: 6.3 K/UL (ref 4–11)

## 2018-01-02 PROCEDURE — 99214 OFFICE O/P EST MOD 30 MIN: CPT | Performed by: FAMILY MEDICINE

## 2018-01-02 RX ORDER — FAMOTIDINE 40 MG/1
40 TABLET, FILM COATED ORAL NIGHTLY
Qty: 90 TABLET | Refills: 3 | Status: SHIPPED | OUTPATIENT
Start: 2018-01-02 | End: 2018-12-19 | Stop reason: SDUPTHER

## 2018-01-02 RX ORDER — AZITHROMYCIN 250 MG/1
TABLET, FILM COATED ORAL
Qty: 1 PACKET | Refills: 0 | Status: SHIPPED | OUTPATIENT
Start: 2018-01-02 | End: 2018-03-01 | Stop reason: CLARIF

## 2018-01-02 RX ORDER — ALLOPURINOL 100 MG/1
100 TABLET ORAL DAILY
Qty: 30 TABLET | Refills: 3 | Status: SHIPPED | OUTPATIENT
Start: 2018-01-02 | End: 2018-05-30 | Stop reason: SDUPTHER

## 2018-01-02 RX ORDER — MONTELUKAST SODIUM 10 MG/1
10 TABLET ORAL NIGHTLY
Qty: 90 TABLET | Refills: 3 | Status: SHIPPED | OUTPATIENT
Start: 2018-01-02 | End: 2018-06-30

## 2018-01-02 RX ORDER — ALBUTEROL SULFATE 90 UG/1
2 AEROSOL, METERED RESPIRATORY (INHALATION) EVERY 6 HOURS PRN
Qty: 3 INHALER | Refills: 3 | Status: SHIPPED | OUTPATIENT
Start: 2018-01-02 | End: 2019-01-10 | Stop reason: SDUPTHER

## 2018-01-02 ASSESSMENT — ENCOUNTER SYMPTOMS
ABDOMINAL PAIN: 0
SHORTNESS OF BREATH: 0
RHINORRHEA: 0
ALLERGIC/IMMUNOLOGIC NEGATIVE: 1
SINUS PRESSURE: 0
ABDOMINAL DISTENTION: 0
DIARRHEA: 0
BACK PAIN: 0
COUGH: 0
SORE THROAT: 0
CHEST TIGHTNESS: 0
VOMITING: 0
NAUSEA: 0
BLOOD IN STOOL: 0
CONSTIPATION: 0
WHEEZING: 0

## 2018-01-02 NOTE — ASSESSMENT & PLAN NOTE
Patient has diastolic CHF which is now stable. EF in 2013 was measured at 55%.   Patient is on Lozol, and losartan with significant improvement in her breathing as well as control of her blood pressure

## 2018-01-02 NOTE — ASSESSMENT & PLAN NOTE
Patient has a history of recurrent gout with hyperuricemia is currently on allopurinol with significant improvement in the frequency and intensity of her attacks.   We are going to check a uric acid levels

## 2018-01-02 NOTE — PROGRESS NOTES
disorder, recurrent episode, in partial remission ([facility] [address]    3. Chronic diastolic congestive heart failure ([address]    4. Peripheral polyneuropathy ([address]    5. Acquired hypothyroidism    6. Simple chronic bronchitis ([facility] [address]    7. Unstable gait    8. CKD (chronic kidney disease), stage III    9. Bilateral acute serous otitis media, recurrence not specified    10. Cough    11. Abdominal distension    12. Hyperuricemia    13. History of gout    14. Acute exacerbation of chronic bronchitis ([facility] [address]    15. Light headed        PLAN:    Maxi Lopes was seen today for 3 month follow-up. Diagnoses and all orders for this visit:    Essential hypertension  -     Comprehensive Metabolic Panel; Future  -     Magnesium; Future    Major depressive disorder, recurrent episode, in partial remission (HCC)    Chronic diastolic congestive heart failure (HCC)    Peripheral polyneuropathy (HCC)    Acquired hypothyroidism    Simple chronic bronchitis (HCC)  -     albuterol sulfate HFA (VENTOLIN HFA) 108 (90 Base) MCG/ACT inhaler; Inhale 2 puffs into the lungs every 6 hours as needed for Wheezing    Unstable gait    CKD (chronic kidney disease), stage III    Bilateral acute serous otitis media, recurrence not specified    Cough  -     montelukast (SINGULAIR) 10 MG tablet; Take 1 tablet by mouth nightly    Abdominal distension  -     famotidine (PEPCID) 40 MG tablet; Take 1 tablet by mouth nightly    Hyperuricemia  -     allopurinol (ZYLOPRIM) 100 MG tablet; Take 1 tablet by mouth daily  -     URIC ACID; Future    History of gout  -     allopurinol (ZYLOPRIM) 100 MG tablet; Take 1 tablet by mouth daily  -     URIC ACID; Future    Acute exacerbation of chronic bronchitis (HCC)  -     azithromycin (ZITHROMAX) 250 MG tablet; Take 2 tabs (500 mg) on Day 1, and take 1 tab (250 mg) on days 2 through 5. Light headed  -     CBC Auto Differential; Future    Other orders  -     Cancel: Tdap (ADACEL) 5-2-15.5 LF-MCG/0.5 injection;  Inject 0.5 mLs into the

## 2018-01-02 NOTE — ASSESSMENT & PLAN NOTE
Patient has chronic bronchitis with mild exacerbation at this time.   She no longer smokes and has not shown any progression of her disease since she quit smoking

## 2018-02-07 ENCOUNTER — TELEPHONE (OUTPATIENT)
Dept: FAMILY MEDICINE CLINIC | Age: 83
End: 2018-02-07

## 2018-02-09 ENCOUNTER — OFFICE VISIT (OUTPATIENT)
Dept: FAMILY MEDICINE CLINIC | Age: 83
End: 2018-02-09

## 2018-02-09 ENCOUNTER — CARE COORDINATOR VISIT (OUTPATIENT)
Dept: FAMILY MEDICINE CLINIC | Age: 83
End: 2018-02-09

## 2018-02-09 VITALS
OXYGEN SATURATION: 97 % | HEART RATE: 68 BPM | HEIGHT: 60 IN | DIASTOLIC BLOOD PRESSURE: 80 MMHG | SYSTOLIC BLOOD PRESSURE: 122 MMHG | BODY MASS INDEX: 30.67 KG/M2 | WEIGHT: 156.2 LBS | RESPIRATION RATE: 15 BRPM

## 2018-02-09 DIAGNOSIS — L29.8 PRURITIC ERYTHEMATOUS RASH: Primary | ICD-10-CM

## 2018-02-09 PROCEDURE — 99213 OFFICE O/P EST LOW 20 MIN: CPT | Performed by: FAMILY MEDICINE

## 2018-02-18 NOTE — PROGRESS NOTES
Chief Complaint   Patient presents with    Rash     RT Side of Back and around to underneath bra strap, since last Friday     SUBJECTIVE:  Pruritic rash under patient's bra strap in the back. Spent present for about a week and is driving her crazy. She has no fever no blistering no other new problem. OBJECTIVE:  /80   Pulse 68   Resp 15   Ht 5' (1.524 m)   Wt 156 lb 3.2 oz (70.9 kg)   SpO2 97%   BMI 30.51 kg/m²   Skin red erythematous macular blanching rash under her bra strap appears to be a heat rash. Unlikely to be due to detergent substances not elsewhere on her body. Heart regular rhythm and rate without a murmur  Lungs clear bilaterally  Abdomen soft nontender    Assessment/Plan:  Maxi Lopes was seen today for rash. Diagnoses and all orders for this visit:    Pruritic erythematous rash  -     hydrocortisone (WESTCORT) 0.2 % cream; Apply topically 2 times daily.       If recurs or not better within a week I will need to reevaluate

## 2018-02-23 ENCOUNTER — TELEPHONE (OUTPATIENT)
Dept: FAMILY MEDICINE CLINIC | Age: 83
End: 2018-02-23

## 2018-02-23 DIAGNOSIS — R05.9 COUGH: Primary | ICD-10-CM

## 2018-02-23 RX ORDER — BENZONATATE 100 MG/1
100 CAPSULE ORAL 3 TIMES DAILY PRN
Qty: 30 CAPSULE | Refills: 0 | Status: SHIPPED | OUTPATIENT
Start: 2018-02-23 | End: 2018-03-02

## 2018-02-28 ENCOUNTER — TELEPHONE (OUTPATIENT)
Dept: FAMILY MEDICINE CLINIC | Age: 83
End: 2018-02-28

## 2018-03-01 ENCOUNTER — OFFICE VISIT (OUTPATIENT)
Dept: FAMILY MEDICINE CLINIC | Age: 83
End: 2018-03-01

## 2018-03-01 VITALS
HEART RATE: 69 BPM | WEIGHT: 157 LBS | HEIGHT: 60 IN | BODY MASS INDEX: 30.82 KG/M2 | DIASTOLIC BLOOD PRESSURE: 70 MMHG | OXYGEN SATURATION: 97 % | RESPIRATION RATE: 16 BRPM | SYSTOLIC BLOOD PRESSURE: 118 MMHG

## 2018-03-01 DIAGNOSIS — M10.9 GOUT INVOLVING TOE OF LEFT FOOT, UNSPECIFIED CAUSE, UNSPECIFIED CHRONICITY: ICD-10-CM

## 2018-03-01 DIAGNOSIS — M10.9 GOUT INVOLVING TOE OF LEFT FOOT, UNSPECIFIED CAUSE, UNSPECIFIED CHRONICITY: Primary | ICD-10-CM

## 2018-03-01 LAB — URIC ACID, SERUM: 6.6 MG/DL (ref 2.6–6)

## 2018-03-01 PROCEDURE — 99213 OFFICE O/P EST LOW 20 MIN: CPT | Performed by: NURSE PRACTITIONER

## 2018-03-01 RX ORDER — PREDNISONE 20 MG/1
TABLET ORAL
Qty: 30 TABLET | Refills: 0 | Status: SHIPPED | OUTPATIENT
Start: 2018-03-01 | End: 2018-06-06 | Stop reason: SDUPTHER

## 2018-03-01 RX ORDER — OLOPATADINE HYDROCHLORIDE 2 MG/ML
SOLUTION/ DROPS OPHTHALMIC
Refills: 5 | COMMUNITY
Start: 2018-02-22 | End: 2018-06-30 | Stop reason: ALTCHOICE

## 2018-03-01 ASSESSMENT — ENCOUNTER SYMPTOMS
NAUSEA: 0
SHORTNESS OF BREATH: 0
DIARRHEA: 0
CONSTIPATION: 0
COUGH: 0
ABDOMINAL PAIN: 0
VOMITING: 0

## 2018-03-01 NOTE — PROGRESS NOTES
SUBJECTIVE:    [facility]  8/3/1929  80 y.o.  female      Chief Complaint   Patient presents with    Foot Swelling     & Pain; left foot; x1 week; has gout; no injury     HPI     Onset 1 week ago. Pain and swelling started in her toes. Pain spread to ball of foot. One day ago area became warm to touch. Denies trauma. Denies any cuts or wounds. Elevation improves symptoms. Walking on it worsens symptoms. She has not taken anything for symptoms. Symptoms have remained constant.      Allergies   Allergen Reactions    Codeine Other (See Comments)     Gets \"jittery feeling\"     Past Medical History:   Diagnosis Date    Allergic rhinitis     Anxiety     Arthritis     Bradycardia     Cellulitis, leg 1/8/2012    Depression     Diabetes mellitus ([facility] [address]     sugars controlled off meds    Diabetic eye exam ([facility] [address] 1/28/16    no retinopathy    Gout     Hyperlipidemia     Hypertension     Hypothyroidism      Past Surgical History:   Procedure Laterality Date    APPENDECTOMY  13 yrs ago    CARPAL TUNNEL RELEASE      bilateral    COLONOSCOPY      ENDOSCOPY, COLON, DIAGNOSTIC  7/8/13    gastritis, hiatal hernia    EYE SURGERY      both eyes    HERNIA REPAIR      umbilical    HIP SURGERY Left 08/22/2016    ORIF    HYSTERECTOMY      complete    JOINT REPLACEMENT      left knee    THYROIDECTOMY, PARTIAL      ULNAR TUNNEL RELEASE      bilateral     Social History     Tobacco History     Smoking Status  Never Smoker    Smokeless Tobacco Use  Never Used          Alcohol History     Alcohol Use Status  No          Drug Use     Drug Use Status  No          Sexual Activity     Sexually Active  Yes Partners  Male Comment  states sometimes                Problem List Items Addressed This Visit     None      Visit Diagnoses     Gout involving toe of left foot, unspecified cause, unspecified chronicity    -  Primary    Relevant Medications    predniSONE (DELTASONE) 20 MG tablet    Other Relevant Orders    URIC ACID          Review of Systems   Constitutional: Negative for appetite change, chills, fatigue, fever and unexpected weight change. Respiratory: Negative for cough and shortness of breath. Cardiovascular: Positive for leg swelling (left foot). Negative for chest pain. Gastrointestinal: Negative for abdominal pain, constipation, diarrhea, nausea and vomiting. Musculoskeletal: Negative for arthralgias. Neurological: Negative for weakness, numbness and headaches. Hematological: Negative for adenopathy. OBJECTIVE:    /70   Pulse 69   Resp 16   Ht 5' (1.524 m)   Wt 157 lb (71.2 kg)   SpO2 97%   BMI 30.66 kg/m²     Physical Exam   Constitutional: She is oriented to person, place, and time. She appears well-developed and well-nourished. HENT:   Head: Normocephalic and atraumatic. Neck: Normal range of motion. Neck supple. Cardiovascular: Normal rate, regular rhythm and normal heart sounds. Exam reveals no gallop and no friction rub. No murmur heard. Pulmonary/Chest: Effort normal and breath sounds normal. No respiratory distress. She has no wheezes. She has no rhonchi. She has no rales. Musculoskeletal:   Left great toe swollen, tender, mild erythema   Neurological: She is alert and oriented to person, place, and time. Skin: Skin is warm and dry. Psychiatric: She has a normal mood and affect. Her behavior is normal.       ASSESSMENT/PLAN:    1. Gout involving toe of left foot, unspecified cause, unspecified chronicity  Will check uric acid  Steroid taper--needs to follow up if no improvement  - URIC ACID; Future  - predniSONE (DELTASONE) 20 MG tablet; 2 po BID x 4 days, 1 po BID x 4 days,  1 po daily x 4 days,  1/2 po daily x 4 days then stop. Dispense: 30 tablet; Refill: 0      Controlled Substances Monitoring:          Return if symptoms worsen or fail to improve.       (Please note that portions of this note may have been completed with a voice recognition program.

## 2018-03-19 ENCOUNTER — OFFICE VISIT (OUTPATIENT)
Dept: FAMILY MEDICINE CLINIC | Age: 83
End: 2018-03-19

## 2018-03-19 VITALS
WEIGHT: 154.2 LBS | OXYGEN SATURATION: 95 % | HEART RATE: 69 BPM | RESPIRATION RATE: 14 BRPM | SYSTOLIC BLOOD PRESSURE: 118 MMHG | DIASTOLIC BLOOD PRESSURE: 68 MMHG | BODY MASS INDEX: 30.27 KG/M2 | HEIGHT: 60 IN

## 2018-03-19 DIAGNOSIS — M10.072 ACUTE IDIOPATHIC GOUT INVOLVING TOE OF LEFT FOOT: Primary | ICD-10-CM

## 2018-03-19 PROCEDURE — 96372 THER/PROPH/DIAG INJ SC/IM: CPT | Performed by: FAMILY MEDICINE

## 2018-03-19 PROCEDURE — 99213 OFFICE O/P EST LOW 20 MIN: CPT | Performed by: FAMILY MEDICINE

## 2018-03-19 RX ORDER — BETAMETHASONE SODIUM PHOSPHATE AND BETAMETHASONE ACETATE 3; 3 MG/ML; MG/ML
12 INJECTION, SUSPENSION INTRA-ARTICULAR; INTRALESIONAL; INTRAMUSCULAR; SOFT TISSUE ONCE
Status: COMPLETED | OUTPATIENT
Start: 2018-03-19 | End: 2018-03-19

## 2018-03-19 RX ADMIN — BETAMETHASONE SODIUM PHOSPHATE AND BETAMETHASONE ACETATE 12 MG: 3; 3 INJECTION, SUSPENSION INTRA-ARTICULAR; INTRALESIONAL; INTRAMUSCULAR; SOFT TISSUE at 17:13

## 2018-03-19 ASSESSMENT — ENCOUNTER SYMPTOMS
ALLERGIC/IMMUNOLOGIC NEGATIVE: 1
RHINORRHEA: 0
COUGH: 0
BACK PAIN: 0
SHORTNESS OF BREATH: 0
CONSTIPATION: 0
ABDOMINAL PAIN: 0
NAUSEA: 0
SINUS PRESSURE: 0
DIARRHEA: 0
ABDOMINAL DISTENTION: 0
WHEEZING: 0
CHEST TIGHTNESS: 0
BLOOD IN STOOL: 0
SORE THROAT: 0
VOMITING: 0

## 2018-03-21 ENCOUNTER — TELEPHONE (OUTPATIENT)
Dept: FAMILY MEDICINE CLINIC | Age: 83
End: 2018-03-21

## 2018-03-22 LAB — DIABETIC RETINOPATHY: NORMAL

## 2018-03-23 LAB — DIABETIC RETINOPATHY: NORMAL

## 2018-03-28 RX ORDER — MECLIZINE HCL 12.5 MG/1
TABLET ORAL
Qty: 30 TABLET | Refills: 2 | Status: SHIPPED | OUTPATIENT
Start: 2018-03-28 | End: 2018-06-27 | Stop reason: SDUPTHER

## 2018-04-02 ENCOUNTER — OFFICE VISIT (OUTPATIENT)
Dept: FAMILY MEDICINE CLINIC | Age: 83
End: 2018-04-02

## 2018-04-02 VITALS
BODY MASS INDEX: 30.23 KG/M2 | OXYGEN SATURATION: 98 % | SYSTOLIC BLOOD PRESSURE: 114 MMHG | WEIGHT: 154 LBS | DIASTOLIC BLOOD PRESSURE: 58 MMHG | HEIGHT: 60 IN | RESPIRATION RATE: 14 BRPM | HEART RATE: 73 BPM

## 2018-04-02 DIAGNOSIS — M15.9 PRIMARY OSTEOARTHRITIS INVOLVING MULTIPLE JOINTS: Chronic | ICD-10-CM

## 2018-04-02 DIAGNOSIS — Z87.39 HISTORY OF GOUT: ICD-10-CM

## 2018-04-02 DIAGNOSIS — F33.41 MAJOR DEPRESSIVE DISORDER, RECURRENT EPISODE, IN PARTIAL REMISSION (HCC): Chronic | ICD-10-CM

## 2018-04-02 DIAGNOSIS — J41.0 SIMPLE CHRONIC BRONCHITIS (HCC): ICD-10-CM

## 2018-04-02 DIAGNOSIS — G62.9 PERIPHERAL POLYNEUROPATHY: Chronic | ICD-10-CM

## 2018-04-02 DIAGNOSIS — M18.12 PRIMARY OSTEOARTHRITIS OF FIRST CARPOMETACARPAL JOINT OF LEFT HAND: ICD-10-CM

## 2018-04-02 DIAGNOSIS — I50.32 CHRONIC DIASTOLIC CONGESTIVE HEART FAILURE (HCC): Chronic | ICD-10-CM

## 2018-04-02 DIAGNOSIS — R60.9 EDEMA, UNSPECIFIED TYPE: Primary | ICD-10-CM

## 2018-04-02 PROCEDURE — 96372 THER/PROPH/DIAG INJ SC/IM: CPT | Performed by: FAMILY MEDICINE

## 2018-04-02 PROCEDURE — 99214 OFFICE O/P EST MOD 30 MIN: CPT | Performed by: FAMILY MEDICINE

## 2018-04-02 RX ORDER — BETAMETHASONE SODIUM PHOSPHATE AND BETAMETHASONE ACETATE 3; 3 MG/ML; MG/ML
6 INJECTION, SUSPENSION INTRA-ARTICULAR; INTRALESIONAL; INTRAMUSCULAR; SOFT TISSUE ONCE
Status: COMPLETED | OUTPATIENT
Start: 2018-04-02 | End: 2018-04-02

## 2018-04-02 RX ADMIN — BETAMETHASONE SODIUM PHOSPHATE AND BETAMETHASONE ACETATE 6 MG: 3; 3 INJECTION, SUSPENSION INTRA-ARTICULAR; INTRALESIONAL; INTRAMUSCULAR; SOFT TISSUE at 14:01

## 2018-04-04 ENCOUNTER — CARE COORDINATION (OUTPATIENT)
Dept: CARE COORDINATION | Age: 83
End: 2018-04-04

## 2018-04-05 ASSESSMENT — ENCOUNTER SYMPTOMS
COUGH: 0
ALLERGIC/IMMUNOLOGIC NEGATIVE: 1
SORE THROAT: 0
BACK PAIN: 0
SHORTNESS OF BREATH: 0
ABDOMINAL DISTENTION: 0
CONSTIPATION: 0
WHEEZING: 0
NAUSEA: 0
CHEST TIGHTNESS: 0
VOMITING: 0
SINUS PRESSURE: 0
DIARRHEA: 0
RHINORRHEA: 0
ABDOMINAL PAIN: 0
BLOOD IN STOOL: 0

## 2018-04-12 RX ORDER — OMEPRAZOLE 20 MG/1
CAPSULE, DELAYED RELEASE ORAL
Qty: 30 CAPSULE | Refills: 11 | Status: SHIPPED | OUTPATIENT
Start: 2018-04-12 | End: 2019-04-10 | Stop reason: SDUPTHER

## 2018-05-02 ENCOUNTER — TELEPHONE (OUTPATIENT)
Dept: FAMILY MEDICINE CLINIC | Age: 83
End: 2018-05-02

## 2018-05-02 RX ORDER — LOSARTAN POTASSIUM 50 MG/1
TABLET ORAL
Qty: 30 TABLET | Refills: 12 | Status: SHIPPED | OUTPATIENT
Start: 2018-05-02 | End: 2018-08-28 | Stop reason: ALTCHOICE

## 2018-05-02 RX ORDER — TRAZODONE HYDROCHLORIDE 50 MG/1
TABLET ORAL
Qty: 30 TABLET | Refills: 11 | Status: SHIPPED | OUTPATIENT
Start: 2018-05-02 | End: 2019-04-10 | Stop reason: SDUPTHER

## 2018-05-03 RX ORDER — METHYLPREDNISOLONE 4 MG/1
TABLET ORAL
Qty: 21 TABLET | Refills: 0 | Status: SHIPPED | OUTPATIENT
Start: 2018-05-03 | End: 2018-05-09

## 2018-05-16 RX ORDER — DOCUSATE SODIUM 100 MG/1
CAPSULE ORAL
Qty: 60 CAPSULE | Refills: 4 | Status: SHIPPED | OUTPATIENT
Start: 2018-05-16 | End: 2018-09-21 | Stop reason: SDUPTHER

## 2018-06-06 ENCOUNTER — OFFICE VISIT (OUTPATIENT)
Dept: FAMILY MEDICINE CLINIC | Age: 83
End: 2018-06-06

## 2018-06-06 VITALS
BODY MASS INDEX: 29.25 KG/M2 | DIASTOLIC BLOOD PRESSURE: 84 MMHG | SYSTOLIC BLOOD PRESSURE: 132 MMHG | RESPIRATION RATE: 16 BRPM | WEIGHT: 149 LBS | HEIGHT: 60 IN | OXYGEN SATURATION: 96 % | HEART RATE: 72 BPM

## 2018-06-06 DIAGNOSIS — N18.30 CKD (CHRONIC KIDNEY DISEASE), STAGE III (HCC): ICD-10-CM

## 2018-06-06 DIAGNOSIS — M10.9 GOUT INVOLVING TOE OF LEFT FOOT, UNSPECIFIED CAUSE, UNSPECIFIED CHRONICITY: ICD-10-CM

## 2018-06-06 DIAGNOSIS — E79.0 HYPERURICEMIA: ICD-10-CM

## 2018-06-06 DIAGNOSIS — M1A.0720 CHRONIC IDIOPATHIC GOUT INVOLVING TOE OF LEFT FOOT WITHOUT TOPHUS: ICD-10-CM

## 2018-06-06 DIAGNOSIS — J41.0 SIMPLE CHRONIC BRONCHITIS (HCC): ICD-10-CM

## 2018-06-06 DIAGNOSIS — R73.9 HYPERGLYCEMIA: ICD-10-CM

## 2018-06-06 DIAGNOSIS — F33.41 MAJOR DEPRESSIVE DISORDER, RECURRENT EPISODE, IN PARTIAL REMISSION (HCC): Chronic | ICD-10-CM

## 2018-06-06 DIAGNOSIS — E03.9 ACQUIRED HYPOTHYROIDISM: Chronic | ICD-10-CM

## 2018-06-06 DIAGNOSIS — M79.672 LEFT FOOT PAIN: ICD-10-CM

## 2018-06-06 DIAGNOSIS — I10 ESSENTIAL HYPERTENSION: Chronic | ICD-10-CM

## 2018-06-06 DIAGNOSIS — M10.072 ACUTE IDIOPATHIC GOUT INVOLVING TOE OF LEFT FOOT: ICD-10-CM

## 2018-06-06 DIAGNOSIS — M10.072 ACUTE IDIOPATHIC GOUT INVOLVING TOE OF LEFT FOOT: Primary | ICD-10-CM

## 2018-06-06 PROBLEM — J42 ACUTE EXACERBATION OF CHRONIC BRONCHITIS (HCC): Status: RESOLVED | Noted: 2018-01-02 | Resolved: 2018-06-06

## 2018-06-06 PROBLEM — J20.9 ACUTE EXACERBATION OF CHRONIC BRONCHITIS (HCC): Status: RESOLVED | Noted: 2018-01-02 | Resolved: 2018-06-06

## 2018-06-06 LAB
A/G RATIO: 2 (ref 1.1–2.2)
ALBUMIN SERPL-MCNC: 4.6 G/DL (ref 3.4–5)
ALP BLD-CCNC: 86 U/L (ref 40–129)
ALT SERPL-CCNC: 14 U/L (ref 10–40)
ANION GAP SERPL CALCULATED.3IONS-SCNC: 17 MMOL/L (ref 3–16)
AST SERPL-CCNC: 22 U/L (ref 15–37)
BASOPHILS ABSOLUTE: 0 K/UL (ref 0–0.2)
BASOPHILS RELATIVE PERCENT: 0.7 %
BILIRUB SERPL-MCNC: 0.3 MG/DL (ref 0–1)
BUN BLDV-MCNC: 16 MG/DL (ref 7–20)
C-REACTIVE PROTEIN: 8.7 MG/L (ref 0–5.1)
CALCIUM SERPL-MCNC: 9.5 MG/DL (ref 8.3–10.6)
CHLORIDE BLD-SCNC: 94 MMOL/L (ref 99–110)
CHOLESTEROL, TOTAL: 182 MG/DL (ref 0–199)
CO2: 27 MMOL/L (ref 21–32)
CREAT SERPL-MCNC: 0.9 MG/DL (ref 0.6–1.2)
EOSINOPHILS ABSOLUTE: 0.2 K/UL (ref 0–0.6)
EOSINOPHILS RELATIVE PERCENT: 2.6 %
GFR AFRICAN AMERICAN: >60
GFR NON-AFRICAN AMERICAN: 59
GLOBULIN: 2.3 G/DL
GLUCOSE BLD-MCNC: 167 MG/DL (ref 70–99)
HCT VFR BLD CALC: 37.5 % (ref 36–48)
HDLC SERPL-MCNC: 52 MG/DL (ref 40–60)
HEMOGLOBIN: 13 G/DL (ref 12–16)
LDL CHOLESTEROL CALCULATED: 80 MG/DL
LYMPHOCYTES ABSOLUTE: 1.4 K/UL (ref 1–5.1)
LYMPHOCYTES RELATIVE PERCENT: 20.5 %
MCH RBC QN AUTO: 30.3 PG (ref 26–34)
MCHC RBC AUTO-ENTMCNC: 34.7 G/DL (ref 31–36)
MCV RBC AUTO: 87.5 FL (ref 80–100)
MONOCYTES ABSOLUTE: 1 K/UL (ref 0–1.3)
MONOCYTES RELATIVE PERCENT: 13.8 %
NEUTROPHILS ABSOLUTE: 4.3 K/UL (ref 1.7–7.7)
NEUTROPHILS RELATIVE PERCENT: 62.4 %
PDW BLD-RTO: 14.3 % (ref 12.4–15.4)
PLATELET # BLD: 135 K/UL (ref 135–450)
PMV BLD AUTO: 8.9 FL (ref 5–10.5)
POTASSIUM SERPL-SCNC: 4.1 MMOL/L (ref 3.5–5.1)
RBC # BLD: 4.29 M/UL (ref 4–5.2)
SODIUM BLD-SCNC: 138 MMOL/L (ref 136–145)
TOTAL PROTEIN: 6.9 G/DL (ref 6.4–8.2)
TRIGL SERPL-MCNC: 249 MG/DL (ref 0–150)
TSH SERPL DL<=0.05 MIU/L-ACNC: 5.96 UIU/ML (ref 0.27–4.2)
URIC ACID, SERUM: 5.3 MG/DL (ref 2.6–6)
VLDLC SERPL CALC-MCNC: 50 MG/DL
WBC # BLD: 6.9 K/UL (ref 4–11)

## 2018-06-06 PROCEDURE — 96372 THER/PROPH/DIAG INJ SC/IM: CPT | Performed by: FAMILY MEDICINE

## 2018-06-06 PROCEDURE — 99214 OFFICE O/P EST MOD 30 MIN: CPT | Performed by: FAMILY MEDICINE

## 2018-06-06 RX ORDER — BETAMETHASONE SODIUM PHOSPHATE AND BETAMETHASONE ACETATE 3; 3 MG/ML; MG/ML
6 INJECTION, SUSPENSION INTRA-ARTICULAR; INTRALESIONAL; INTRAMUSCULAR; SOFT TISSUE ONCE
Status: COMPLETED | OUTPATIENT
Start: 2018-06-06 | End: 2018-06-06

## 2018-06-06 RX ORDER — PREDNISONE 20 MG/1
TABLET ORAL
Qty: 30 TABLET | Refills: 0 | Status: SHIPPED | OUTPATIENT
Start: 2018-06-06 | End: 2018-06-30

## 2018-06-06 RX ADMIN — BETAMETHASONE SODIUM PHOSPHATE AND BETAMETHASONE ACETATE 6 MG: 3; 3 INJECTION, SUSPENSION INTRA-ARTICULAR; INTRALESIONAL; INTRAMUSCULAR; SOFT TISSUE at 14:33

## 2018-06-07 ENCOUNTER — CARE COORDINATION (OUTPATIENT)
Dept: CARE COORDINATION | Age: 83
End: 2018-06-07

## 2018-06-07 LAB
ESTIMATED AVERAGE GLUCOSE: 185.8 MG/DL
HBA1C MFR BLD: 8.1 %

## 2018-06-11 ASSESSMENT — ENCOUNTER SYMPTOMS
NAUSEA: 0
ORTHOPNEA: 0
EYES NEGATIVE: 1
CONSTIPATION: 0
HEMOPTYSIS: 0
VOMITING: 0
BLOOD IN STOOL: 0
BACK PAIN: 1
COUGH: 0
SPUTUM PRODUCTION: 1
WHEEZING: 0
HEARTBURN: 1
DIARRHEA: 0
ABDOMINAL PAIN: 0
SHORTNESS OF BREATH: 1

## 2018-06-13 RX ORDER — LOVASTATIN 40 MG/1
TABLET ORAL
Qty: 90 TABLET | Refills: 3 | Status: SHIPPED | OUTPATIENT
Start: 2018-06-13 | End: 2019-06-12 | Stop reason: SDUPTHER

## 2018-06-27 RX ORDER — MECLIZINE HCL 12.5 MG/1
TABLET ORAL
Qty: 30 TABLET | Refills: 2 | Status: SHIPPED | OUTPATIENT
Start: 2018-06-27 | End: 2018-06-30 | Stop reason: SDUPTHER

## 2018-07-02 ENCOUNTER — CARE COORDINATION (OUTPATIENT)
Dept: CARE COORDINATION | Age: 83
End: 2018-07-02

## 2018-07-02 NOTE — CARE COORDINATION
Call to pt for cc f/u as pt did not show to ov at scheduled time. Pt reports that daughter is gravely ill and pt is unable to make it to apptmt today. Support provided to pt. Pt reports she obtained medication from ED visit and is taking without difficulty. Pt denies needs at this time. Plans to call to schedule f/u with pcp at later time. Advised to call if any needs arise. Will continue to follow.

## 2018-07-11 ENCOUNTER — TELEPHONE (OUTPATIENT)
Dept: FAMILY MEDICINE CLINIC | Age: 83
End: 2018-07-11

## 2018-07-11 NOTE — TELEPHONE ENCOUNTER
Ana from [facility] contacted office this date stating patient has 1x1 Stage II pressure ulcer to coccyx. Requesting treatment order:  Mepilex border to area, change Q 3-5 days or PRN. Monitor for changes. Also states pt recently seen at ED. Singulair was stopped via ED. Do you want to remain with med stopped at this time? Please advise.

## 2018-07-12 ENCOUNTER — OFFICE VISIT (OUTPATIENT)
Dept: FAMILY MEDICINE CLINIC | Age: 83
End: 2018-07-12

## 2018-07-12 VITALS
DIASTOLIC BLOOD PRESSURE: 66 MMHG | RESPIRATION RATE: 16 BRPM | BODY MASS INDEX: 30.27 KG/M2 | WEIGHT: 155 LBS | HEART RATE: 83 BPM | SYSTOLIC BLOOD PRESSURE: 118 MMHG

## 2018-07-12 DIAGNOSIS — I50.32 CHRONIC DIASTOLIC CONGESTIVE HEART FAILURE (HCC): Chronic | ICD-10-CM

## 2018-07-12 DIAGNOSIS — E87.6 HYPOKALEMIA: ICD-10-CM

## 2018-07-12 DIAGNOSIS — R42 VERTIGO: ICD-10-CM

## 2018-07-12 DIAGNOSIS — R26.81 UNSTABLE GAIT: ICD-10-CM

## 2018-07-12 DIAGNOSIS — R60.9 DEPENDENT EDEMA: ICD-10-CM

## 2018-07-12 DIAGNOSIS — Z91.81 AT HIGH RISK FOR FALLS: ICD-10-CM

## 2018-07-12 DIAGNOSIS — N18.30 CKD (CHRONIC KIDNEY DISEASE), STAGE III (HCC): ICD-10-CM

## 2018-07-12 DIAGNOSIS — F33.41 MAJOR DEPRESSIVE DISORDER, RECURRENT EPISODE, IN PARTIAL REMISSION (HCC): Chronic | ICD-10-CM

## 2018-07-12 DIAGNOSIS — I10 ESSENTIAL HYPERTENSION: Chronic | ICD-10-CM

## 2018-07-12 DIAGNOSIS — E03.9 ACQUIRED HYPOTHYROIDISM: Chronic | ICD-10-CM

## 2018-07-12 DIAGNOSIS — R42 VERTIGO: Primary | ICD-10-CM

## 2018-07-12 LAB
A/G RATIO: 2.1 (ref 1.1–2.2)
ALBUMIN SERPL-MCNC: 4.2 G/DL (ref 3.4–5)
ALP BLD-CCNC: 76 U/L (ref 40–129)
ALT SERPL-CCNC: 15 U/L (ref 10–40)
ANION GAP SERPL CALCULATED.3IONS-SCNC: 13 MMOL/L (ref 3–16)
AST SERPL-CCNC: 20 U/L (ref 15–37)
BASOPHILS ABSOLUTE: 0 K/UL (ref 0–0.2)
BASOPHILS RELATIVE PERCENT: 0.8 %
BILIRUB SERPL-MCNC: <0.2 MG/DL (ref 0–1)
BUN BLDV-MCNC: 15 MG/DL (ref 7–20)
CALCIUM SERPL-MCNC: 9.4 MG/DL (ref 8.3–10.6)
CHLORIDE BLD-SCNC: 96 MMOL/L (ref 99–110)
CO2: 29 MMOL/L (ref 21–32)
CREAT SERPL-MCNC: 0.9 MG/DL (ref 0.6–1.2)
EOSINOPHILS ABSOLUTE: 0.1 K/UL (ref 0–0.6)
EOSINOPHILS RELATIVE PERCENT: 2.1 %
GFR AFRICAN AMERICAN: >60
GFR NON-AFRICAN AMERICAN: 59
GLOBULIN: 2 G/DL
GLUCOSE BLD-MCNC: 217 MG/DL (ref 70–99)
HCT VFR BLD CALC: 33.9 % (ref 36–48)
HEMOGLOBIN: 12 G/DL (ref 12–16)
LYMPHOCYTES ABSOLUTE: 1.4 K/UL (ref 1–5.1)
LYMPHOCYTES RELATIVE PERCENT: 26.2 %
MAGNESIUM: 1.5 MG/DL (ref 1.8–2.4)
MCH RBC QN AUTO: 30.5 PG (ref 26–34)
MCHC RBC AUTO-ENTMCNC: 35.5 G/DL (ref 31–36)
MCV RBC AUTO: 86 FL (ref 80–100)
MONOCYTES ABSOLUTE: 0.8 K/UL (ref 0–1.3)
MONOCYTES RELATIVE PERCENT: 14.1 %
NEUTROPHILS ABSOLUTE: 3.1 K/UL (ref 1.7–7.7)
NEUTROPHILS RELATIVE PERCENT: 56.8 %
PDW BLD-RTO: 14.2 % (ref 12.4–15.4)
PLATELET # BLD: 160 K/UL (ref 135–450)
PMV BLD AUTO: 8.7 FL (ref 5–10.5)
POTASSIUM SERPL-SCNC: 3.9 MMOL/L (ref 3.5–5.1)
PRO-BNP: 166 PG/ML (ref 0–449)
RBC # BLD: 3.94 M/UL (ref 4–5.2)
SODIUM BLD-SCNC: 138 MMOL/L (ref 136–145)
TOTAL PROTEIN: 6.2 G/DL (ref 6.4–8.2)
TSH SERPL DL<=0.05 MIU/L-ACNC: 4.34 UIU/ML (ref 0.27–4.2)
WBC # BLD: 5.5 K/UL (ref 4–11)

## 2018-07-12 PROCEDURE — 99214 OFFICE O/P EST MOD 30 MIN: CPT | Performed by: FAMILY MEDICINE

## 2018-07-12 RX ORDER — VENLAFAXINE HYDROCHLORIDE 75 MG/1
75 CAPSULE, EXTENDED RELEASE ORAL DAILY
Qty: 30 CAPSULE | Refills: 11 | Status: SHIPPED | OUTPATIENT
Start: 2018-07-12 | End: 2019-07-17 | Stop reason: SDUPTHER

## 2018-07-12 RX ORDER — LEVOTHYROXINE SODIUM 0.1 MG/1
100 TABLET ORAL DAILY
Qty: 30 TABLET | Refills: 11 | Status: SHIPPED | OUTPATIENT
Start: 2018-07-12 | End: 2019-07-17 | Stop reason: SDUPTHER

## 2018-07-12 RX ORDER — MECLIZINE HYDROCHLORIDE CHEWABLE TABLETS 25 MG/1
25 TABLET, CHEWABLE ORAL 3 TIMES DAILY PRN
Qty: 90 TABLET | Refills: 1 | Status: SHIPPED | OUTPATIENT
Start: 2018-07-12 | End: 2019-01-10 | Stop reason: SDUPTHER

## 2018-07-12 ASSESSMENT — ENCOUNTER SYMPTOMS
VOMITING: 0
CONSTIPATION: 0
ORTHOPNEA: 0
HEMOPTYSIS: 0
ABDOMINAL PAIN: 0
EYES NEGATIVE: 1
BLOOD IN STOOL: 0
NAUSEA: 0
DIARRHEA: 0
SPUTUM PRODUCTION: 1
HEARTBURN: 1
WHEEZING: 0
SHORTNESS OF BREATH: 1
BACK PAIN: 1
COUGH: 0

## 2018-07-12 ASSESSMENT — PATIENT HEALTH QUESTIONNAIRE - PHQ9
2. FEELING DOWN, DEPRESSED OR HOPELESS: 1
1. LITTLE INTEREST OR PLEASURE IN DOING THINGS: 0
SUM OF ALL RESPONSES TO PHQ QUESTIONS 1-9: 1
SUM OF ALL RESPONSES TO PHQ9 QUESTIONS 1 & 2: 1

## 2018-07-12 NOTE — PROGRESS NOTES
EC tablet Take 1 tablet by mouth daily. 30 tablet 5     No current facility-administered medications on file prior to visit. Allergies   Allergen Reactions    Codeine Other (See Comments)     Gets \"jittery feeling\"     Review of PMH, PSH, Family Hx, Allergies and updates made as needed. Review of Systems   Constitutional: Negative. HENT: Negative. Eyes: Negative. Respiratory: Positive for sputum production and shortness of breath. Negative for cough, hemoptysis and wheezing. Persistent sputum but decreased shortness of breath   Cardiovascular: Negative for chest pain, palpitations, orthopnea, claudication, leg swelling and PND. Gastrointestinal: Positive for heartburn. Negative for abdominal pain, blood in stool, constipation, diarrhea, melena, nausea and vomiting. Genitourinary: Positive for urgency. Negative for dysuria, flank pain, frequency and hematuria. Musculoskeletal: Positive for back pain, joint pain, myalgias and neck pain. Negative for falls. Toe has healed since last visit   Skin: Negative. Neurological: Positive for dizziness. Negative for tingling, tremors, sensory change, speech change, focal weakness, seizures, loss of consciousness and headaches. Endo/Heme/Allergies: Positive for environmental allergies. Negative for polydipsia. Does not bruise/bleed easily. Psychiatric/Behavioral: Positive for depression. Negative for hallucinations, memory loss, substance abuse and suicidal ideas. The patient is not nervous/anxious and does not have insomnia. OBJECTIVE:  /66   Pulse 83   Resp 16   Wt 155 lb (70.3 kg)   BMI 30.27 kg/m²     Physical Exam   Constitutional: She is oriented to person, place, and time. She appears well-developed and well-nourished. no orthostasis   HENT:   Head: Normocephalic and atraumatic.    Right Ear: External ear normal.   Left Ear: External ear normal.   Nose: Nose normal.   Mouth/Throat: Oropharynx is clear and

## 2018-08-01 ENCOUNTER — TELEPHONE (OUTPATIENT)
Dept: FAMILY MEDICINE CLINIC | Age: 83
End: 2018-08-01

## 2018-08-01 NOTE — TELEPHONE ENCOUNTER
Courtesy call [phone]-    Per Ana at [facility]:  Patient took double dose of Potassium and Allopurinol x3 days (Fri, Sat, Sun). Ana educated patient on administration and placed meds into med box for home care services to administer meds. States patient is doing ok. Vital signs stable and recent decub to buttocks is healed.

## 2018-08-14 ENCOUNTER — OFFICE VISIT (OUTPATIENT)
Dept: FAMILY MEDICINE CLINIC | Age: 83
End: 2018-08-14

## 2018-08-14 ENCOUNTER — CARE COORDINATION (OUTPATIENT)
Dept: CARE COORDINATION | Age: 83
End: 2018-08-14

## 2018-08-14 VITALS
HEART RATE: 77 BPM | DIASTOLIC BLOOD PRESSURE: 70 MMHG | OXYGEN SATURATION: 97 % | WEIGHT: 153.38 LBS | BODY MASS INDEX: 29.95 KG/M2 | RESPIRATION RATE: 16 BRPM | SYSTOLIC BLOOD PRESSURE: 116 MMHG

## 2018-08-14 DIAGNOSIS — J41.0 SIMPLE CHRONIC BRONCHITIS (HCC): ICD-10-CM

## 2018-08-14 DIAGNOSIS — I50.32 CHRONIC DIASTOLIC CONGESTIVE HEART FAILURE (HCC): Chronic | ICD-10-CM

## 2018-08-14 DIAGNOSIS — Z87.39 HISTORY OF GOUT: ICD-10-CM

## 2018-08-14 DIAGNOSIS — N18.30 CKD (CHRONIC KIDNEY DISEASE), STAGE III (HCC): ICD-10-CM

## 2018-08-14 DIAGNOSIS — E78.2 MIXED HYPERLIPIDEMIA: Chronic | ICD-10-CM

## 2018-08-14 DIAGNOSIS — E03.9 ACQUIRED HYPOTHYROIDISM: Chronic | ICD-10-CM

## 2018-08-14 DIAGNOSIS — I50.32 CHRONIC DIASTOLIC CONGESTIVE HEART FAILURE (HCC): Primary | Chronic | ICD-10-CM

## 2018-08-14 DIAGNOSIS — R60.9 DEPENDENT EDEMA: ICD-10-CM

## 2018-08-14 DIAGNOSIS — R53.83 FATIGUE, UNSPECIFIED TYPE: ICD-10-CM

## 2018-08-14 DIAGNOSIS — R26.81 UNSTABLE GAIT: ICD-10-CM

## 2018-08-14 DIAGNOSIS — G62.9 PERIPHERAL POLYNEUROPATHY: Chronic | ICD-10-CM

## 2018-08-14 DIAGNOSIS — E11.9 TYPE 2 DIABETES MELLITUS WITHOUT COMPLICATION, WITHOUT LONG-TERM CURRENT USE OF INSULIN (HCC): ICD-10-CM

## 2018-08-14 DIAGNOSIS — E79.0 HYPERURICEMIA: ICD-10-CM

## 2018-08-14 DIAGNOSIS — I10 ESSENTIAL HYPERTENSION: Chronic | ICD-10-CM

## 2018-08-14 DIAGNOSIS — F33.41 MAJOR DEPRESSIVE DISORDER, RECURRENT EPISODE, IN PARTIAL REMISSION (HCC): Chronic | ICD-10-CM

## 2018-08-14 PROBLEM — R14.0 ABDOMINAL DISTENSION: Status: RESOLVED | Noted: 2018-01-02 | Resolved: 2018-08-14

## 2018-08-14 PROBLEM — R42 LIGHT HEADED: Status: RESOLVED | Noted: 2018-01-02 | Resolved: 2018-08-14

## 2018-08-14 PROBLEM — M1A.0720 CHRONIC IDIOPATHIC GOUT INVOLVING TOE OF LEFT FOOT WITHOUT TOPHUS: Status: RESOLVED | Noted: 2018-06-06 | Resolved: 2018-08-14

## 2018-08-14 LAB
A/G RATIO: 2.2 (ref 1.1–2.2)
ALBUMIN SERPL-MCNC: 4.6 G/DL (ref 3.4–5)
ALP BLD-CCNC: 75 U/L (ref 40–129)
ALT SERPL-CCNC: 12 U/L (ref 10–40)
ANION GAP SERPL CALCULATED.3IONS-SCNC: 17 MMOL/L (ref 3–16)
AST SERPL-CCNC: 21 U/L (ref 15–37)
BASOPHILS ABSOLUTE: 0.1 K/UL (ref 0–0.2)
BASOPHILS RELATIVE PERCENT: 1 %
BILIRUB SERPL-MCNC: 0.3 MG/DL (ref 0–1)
BUN BLDV-MCNC: 20 MG/DL (ref 7–20)
C-REACTIVE PROTEIN: 1.8 MG/L (ref 0–5.1)
CALCIUM SERPL-MCNC: 9.4 MG/DL (ref 8.3–10.6)
CHLORIDE BLD-SCNC: 99 MMOL/L (ref 99–110)
CO2: 26 MMOL/L (ref 21–32)
CREAT SERPL-MCNC: 1 MG/DL (ref 0.6–1.2)
EOSINOPHILS ABSOLUTE: 0.2 K/UL (ref 0–0.6)
EOSINOPHILS RELATIVE PERCENT: 2.6 %
GFR AFRICAN AMERICAN: >60
GFR NON-AFRICAN AMERICAN: 52
GLOBULIN: 2.1 G/DL
GLUCOSE BLD-MCNC: 216 MG/DL (ref 70–99)
HCT VFR BLD CALC: 36.5 % (ref 36–48)
HEMOGLOBIN: 12.4 G/DL (ref 12–16)
LYMPHOCYTES ABSOLUTE: 1.4 K/UL (ref 1–5.1)
LYMPHOCYTES RELATIVE PERCENT: 20.8 %
MCH RBC QN AUTO: 30.2 PG (ref 26–34)
MCHC RBC AUTO-ENTMCNC: 34 G/DL (ref 31–36)
MCV RBC AUTO: 88.8 FL (ref 80–100)
MONOCYTES ABSOLUTE: 0.9 K/UL (ref 0–1.3)
MONOCYTES RELATIVE PERCENT: 13.6 %
NEUTROPHILS ABSOLUTE: 4.2 K/UL (ref 1.7–7.7)
NEUTROPHILS RELATIVE PERCENT: 62 %
PDW BLD-RTO: 14.7 % (ref 12.4–15.4)
PLATELET # BLD: 114 K/UL (ref 135–450)
PMV BLD AUTO: 9.2 FL (ref 5–10.5)
POTASSIUM SERPL-SCNC: 3.8 MMOL/L (ref 3.5–5.1)
RBC # BLD: 4.1 M/UL (ref 4–5.2)
SODIUM BLD-SCNC: 142 MMOL/L (ref 136–145)
T4 FREE: 1.6 NG/DL (ref 0.9–1.8)
TOTAL PROTEIN: 6.7 G/DL (ref 6.4–8.2)
TSH SERPL DL<=0.05 MIU/L-ACNC: 1.78 UIU/ML (ref 0.27–4.2)
URIC ACID, SERUM: 6 MG/DL (ref 2.6–6)
WBC # BLD: 6.8 K/UL (ref 4–11)

## 2018-08-14 PROCEDURE — 99214 OFFICE O/P EST MOD 30 MIN: CPT | Performed by: FAMILY MEDICINE

## 2018-08-14 RX ORDER — FUROSEMIDE 40 MG/1
40 TABLET ORAL DAILY
Qty: 30 TABLET | Refills: 3 | Status: ON HOLD | OUTPATIENT
Start: 2018-08-14 | End: 2018-08-26 | Stop reason: HOSPADM

## 2018-08-14 ASSESSMENT — ENCOUNTER SYMPTOMS
CONSTIPATION: 0
BLOOD IN STOOL: 0
ORTHOPNEA: 0
COUGH: 0
WHEEZING: 0
ABDOMINAL PAIN: 0
HEARTBURN: 1
NAUSEA: 0
HEMOPTYSIS: 0
VOMITING: 0
SPUTUM PRODUCTION: 1
EYES NEGATIVE: 1
DIARRHEA: 0
SHORTNESS OF BREATH: 1
BACK PAIN: 1

## 2018-08-14 NOTE — PROGRESS NOTES
speech change, focal weakness, seizures, loss of consciousness and headaches. Endo/Heme/Allergies: Positive for environmental allergies. Negative for polydipsia. Does not bruise/bleed easily. Psychiatric/Behavioral: Positive for depression. Negative for hallucinations, memory loss, substance abuse and suicidal ideas. The patient is not nervous/anxious and does not have insomnia. Patient's brothers  since her last visit to me. He was the last of her siblings and she misses very badly     . OBJECTIVE:  /70 (Site: Right Arm, Position: Sitting, Cuff Size: Large Adult)   Pulse 77   Resp 16   Wt 153 lb 6 oz (69.6 kg)   SpO2 97%   BMI 29.95 kg/m²     Physical Exam   Constitutional: She is oriented to person, place, and time. She appears well-developed and well-nourished. no orthostasis   HENT:   Head: Normocephalic and atraumatic. Right Ear: External ear normal.   Left Ear: External ear normal.   Nose: Nose normal.   Mouth/Throat: Oropharynx is clear and moist.   Eyes: Pupils are equal, round, and reactive to light. Conjunctivae and EOM are normal.   Neck: Normal range of motion. Neck supple. No JVD present. No tracheal deviation present. No thyromegaly present. Cardiovascular: Normal rate, regular rhythm and intact distal pulses. Murmur heard. Systolic murmur is present with a grade of 3/6    Diastolic murmur is present with a grade of 1/6   Pulmonary/Chest: Effort normal. No accessory muscle usage. No respiratory distress. She has decreased breath sounds in the right lower field. She has no wheezes. She has no rhonchi. She has no rales. Abdominal: Soft. Bowel sounds are normal. She exhibits no distension and no mass. There is no tenderness. There is no rebound and no guarding. Musculoskeletal: She exhibits edema and tenderness.         Right knee: She exhibits normal range of motion, no swelling, no effusion, no ecchymosis, no deformity, no laceration, no erythema, no LCL laxity, normal patellar mobility, normal meniscus and no MCL laxity. Tenderness found. Medial joint line and lateral joint line tenderness noted. Left knee: She exhibits normal range of motion, no swelling, no effusion, no ecchymosis, no deformity, no laceration, no erythema, normal alignment, no LCL laxity, normal patellar mobility, no bony tenderness, normal meniscus and no MCL laxity. No tenderness found. Lumbar back: She exhibits decreased range of motion, tenderness and pain. She exhibits no bony tenderness, no swelling, no edema, no deformity, no laceration, no spasm and normal pulse. 1+ ankle edema  Neck hands feet, knees tender with joint enlargement       Lymphadenopathy:     She has no cervical adenopathy. Neurological: She is alert and oriented to person, place, and time. She has normal reflexes. Diminished monofilament bilat feet  No nystagmus   Skin: Skin is warm and dry. No rash noted. Psychiatric: She has a normal mood and affect. Her behavior is normal. Judgment and thought content normal.   sad     Visual inspection:  Deformity/amputation: absent  Skin lesions/pre-ulcerative calluses: absent  Edema: right- 2+, left- 2+    Sensory exam:  Monofilament sensation: normal  (minimum of 5 random plantar locations tested, avoiding callused areas - > 1 area with absence of sensation is + for neuropathy)    Plus at least one of the following:  Pulses: normal,   Pinprick: N/A  Proprioception: Impaired  Vibration (128 Hz): Impaired    1. Chronic diastolic congestive heart failure ([address]    2. Major depressive disorder, recurrent episode, in partial remission ([address]    3. Simple chronic bronchitis ([address]    4. Essential hypertension    5. Acquired hypothyroidism    6. Mixed hyperlipidemia    7. Unstable gait    8. Fatigue, unspecified type    9. Hyperuricemia    10. CKD (chronic kidney disease), stage III    11. Dependent edema    12. History of gout    13. Peripheral polyneuropathy    14.  Type 2 diabetes mellitus without complication, without long-term current use of insulin ([facility] [address]        1. Chronic diastolic congestive heart failure (HCC)  Chronic diastolic heart failure will recheck her lab work and restarted Lasix 40 mg daily. Note that in the past with then had renal dysfunction will need to follow  - CBC Auto Differential; Future  - Comprehensive Metabolic Panel; Future  - TSH without Reflex; Future  - furosemide (LASIX) 40 MG tablet; Take 1 tablet by mouth daily  Dispense: 30 tablet; Refill: 3    2. Major depressive disorder, recurrent episode, in partial remission ([facility] [address]  Patient's depression is partial remission. Her brother  recently and she is grieving his loss    3. Simple chronic bronchitis (HCC)  Chronic bronchitis patient does not smoke but she has had chronic cough and lung disease. She's doing better at this visit    4. Essential hypertension  Blood pressure is well-controlled    5. Acquired hypothyroidism  Thyroid is been well controlled patient is on Synthroid we discussed treatment of hypothyroidism  - TSH without Reflex; Future  - T4, Free; Future    6. Mixed hyperlipidemia  History of mixed hyperlipidemia. This is been well controlled we will continue her Mevacor. There are stronger other medications available Mevacor is more affordable and there is no good data on the very oldest is what the appropriate therapy is.    7. Unstable gait  Patient walks with a walker but has not been falling and is doing well    8. Fatigue, unspecified type    - C-Reactive Protein; Future  - TSH without Reflex; Future    9. Hyperuricemia  History of gout with hyperuricemia will recheck uric acid levels  - URIC ACID; Future    10. CKD (chronic kidney disease), stage III  History of CK D3 will reevaluate    11. Dependent edema  Restart Lasix due to her edema and heart failure. - furosemide (LASIX) 40 MG tablet; Take 1 tablet by mouth daily  Dispense: 30 tablet; Refill: 3    12.  History of gout    - URIC ACID; Future    13. Peripheral polyneuropathy  Patient can planes of intermittent burning pain of both lateral legs. Exam is normal and at this time patient does not want further evaluation with EMG. I would rather not start her on Neurontin which may help her neuropathic discomfort but would likely make her less stable more likely to fall. This was discussed at length with the patient    14.  Persistently elevated sugars - start januvia at renal dose

## 2018-08-14 NOTE — CARE COORDINATION
Ambulatory Care Coordination Note  CM Risk Score: 4  Silke Mortality Risk Score: 23.79    ACC: Godwin Álvarez RN    Summary Note: Met with pt at pcp ov. Reports continus to have in home aide assistance and is taking meds as ordered. Also has home care nurse. Reports that she a dry cough that started approx a week ago. Is non productive and denies sob. Reviewed  zone management with pt. Is lightheaded at times and dizzy even w taking meclizine. Inquired as she eats small cups of grapefruit a few times a week and if this was ok or if possible med interaction. Ok per pcp. PCP made aware of above info. Will continue to educate and support. Care Coordination Interventions    Program Enrollment:  Rising Risk  Referral from Primary Care Provider:  No  Suggested Interventions and Community Resources  Fall Risk Prevention: In Process  Home Health Services:  Completed  Zone Management Tools: In Process         Goals Addressed             Most Recent     Conditions and Symptoms   On track (8/14/2018)             I will notify my provider of any symptoms that indicate a worsening of my condition. Barriers: none  Plan for overcoming my barriers: N/A  Confidence: 9/10  Anticipated Goal Completion Date: 10/23/17              Prior to Admission medications    Medication Sig Start Date End Date Taking?  Authorizing Provider   spironolactone (ALDACTONE) 25 MG tablet Take 1 tablet by mouth 2 times daily 8/26/18   Emelia Chan MD   ciprofloxacin (CIPRO) 250 MG tablet Take 1 tablet by mouth 2 times daily for 3 days 8/26/18 8/29/18  Emelia Chan MD   potassium chloride (KLOR-CON M) 20 MEQ extended release tablet Take 1 tablet by mouth daily for 3 days 8/26/18 8/29/18  Emelia Chan MD   Blood Glucose Monitoring Suppl [facility] BLOOD GLUCOSE MONITOR) STEWART Use as directed for daily blood sugar testing 8/21/18   Shanna Vences MD   blood glucose test strips [facility] BLOOD GLUCOSE TEST) strip 1 each by In Vitro route daily As needed. Patient taking differently: 1 each by In Vitro route 2 times daily As needed. 8/21/18   Shilpi Hollis MD   SITagliptin (JANUVIA) 50 MG tablet Take 1 tablet by mouth daily 8/15/18   Shilpi Hollis MD   venlafaxine (EFFEXOR XR) 75 MG extended release capsule Take 1 capsule by mouth daily 7/12/18   Shilpi Hollis MD   levothyroxine (SYNTHROID) 100 MCG tablet Take 1 tablet by mouth Daily 7/12/18   Shilpi Hollis MD   meclizine (ANTIVERT) 25 MG CHEW Take 1 tablet by mouth 3 times daily as needed (dizziness) 7/12/18   Shilpi Hollis MD   allopurinol (ZYLOPRIM) 100 MG tablet TAKE 1 TABLET BY MOUTH DAILY 6/27/18   Shilpi Hollis MD   lovastatin (MEVACOR) 40 MG tablet TAKE ONE TABLET DAILY 6/13/18   Shilpi Hollis MD   DOCQLACE 100 MG capsule TAKE ONE CAPSULE TWO TIMES A DAY AS NEEDED FOR CONSTIPATION 5/16/18   Shilpi Hollis MD   traZODone (DESYREL) 50 MG tablet TAKE 1 TABLET BY MOUTH NIGHTLY. 5/2/18   Shilpi Hollis MD   losartan (COZAAR) 50 MG tablet TAKE 1 TABLET BY MOUTH DAILY 5/2/18   Shilpi Hollis MD   omeprazole (PRILOSEC) 20 MG delayed release capsule TAKE ONE CAPSULE BY MOUTH EVERY DAY 4/12/18   Shilpi Hollis MD   albuterol sulfate HFA (VENTOLIN HFA) 108 (90 Base) MCG/ACT inhaler Inhale 2 puffs into the lungs every 6 hours as needed for Wheezing 1/2/18   Shilpi Hollis MD   famotidine (PEPCID) 40 MG tablet Take 1 tablet by mouth nightly 1/2/18   Shilpi Hollis MD   acetaminophen (TYLENOL) 650 MG CR tablet Take 1 tablet by mouth every 8 hours as needed for Pain 3/1/16   MD Jacques Andrews. Devices (TUB TRANSFER BOARD) MISC Use as directed 11/11/15   Shilpi Hollis MD   aspirin EC 81 MG EC tablet Take 1 tablet by mouth daily.  9/16/14   Shilpi Hollis MD       Future Appointments  Date Time Provider Ancelmo Serrano   8/28/2018 9:15 AM MD JAH AndrewsB [facility]   8/29/2018 9:30 AM [id number] [id number] [address]

## 2018-08-20 ENCOUNTER — TELEPHONE (OUTPATIENT)
Dept: FAMILY MEDICINE CLINIC | Age: 83
End: 2018-08-20

## 2018-08-21 ENCOUNTER — TELEPHONE (OUTPATIENT)
Dept: FAMILY MEDICINE CLINIC | Age: 83
End: 2018-08-21

## 2018-08-21 DIAGNOSIS — E11.9 TYPE 2 DIABETES MELLITUS WITHOUT COMPLICATION, WITHOUT LONG-TERM CURRENT USE OF INSULIN (HCC): Primary | ICD-10-CM

## 2018-08-21 RX ORDER — BLOOD-GLUCOSE METER
EACH MISCELLANEOUS
Qty: 2 DEVICE | Refills: 3 | Status: SHIPPED | OUTPATIENT
Start: 2018-08-21

## 2018-08-21 NOTE — TELEPHONE ENCOUNTER
Charli Tapia, home nurse for [facility], [facility] patient's glucometer is extremely old and is requesting new glucometer be sent to [facility] in [place]. States patient is not consistent with testing as the current machine is old and hurts to test with per nurse. Please advise.

## 2018-08-21 NOTE — TELEPHONE ENCOUNTER
I cld and spoke with pt and she states the nurse has already been there this morning and left. The 1st time it was taken it was 217; then she took her medications and the 2nd check was 269.

## 2018-08-24 ENCOUNTER — TELEPHONE (OUTPATIENT)
Dept: FAMILY MEDICINE CLINIC | Age: 83
End: 2018-08-24

## 2018-08-24 NOTE — TELEPHONE ENCOUNTER
Patient was standing at kitchen sink and felt \"shaky and blank\"-brief sx. Patient states no further sx since that time. States home health aide is at house-patient did not fall or experience any other sx. They checked pt's BS which was 228. They do not have means to check BP at home. I advised patient to increase her fluid intake, monitor blood sugar, and if sx arise again to go to ED for eval or contact office for immediate appt. Pt agreed w/POC.

## 2018-08-27 ENCOUNTER — CARE COORDINATION (OUTPATIENT)
Dept: CASE MANAGEMENT | Age: 83
End: 2018-08-27

## 2018-08-27 DIAGNOSIS — I50.30 DIASTOLIC CONGESTIVE HEART FAILURE, UNSPECIFIED HF CHRONICITY (HCC): Primary | Chronic | ICD-10-CM

## 2018-08-27 PROCEDURE — 1111F DSCHRG MED/CURRENT MED MERGE: CPT | Performed by: FAMILY MEDICINE

## 2018-08-28 ENCOUNTER — CARE COORDINATION (OUTPATIENT)
Dept: CARE COORDINATION | Age: 83
End: 2018-08-28

## 2018-08-28 ENCOUNTER — OFFICE VISIT (OUTPATIENT)
Dept: FAMILY MEDICINE CLINIC | Age: 83
End: 2018-08-28

## 2018-08-28 VITALS
WEIGHT: 149.6 LBS | HEART RATE: 70 BPM | SYSTOLIC BLOOD PRESSURE: 104 MMHG | RESPIRATION RATE: 16 BRPM | BODY MASS INDEX: 29.22 KG/M2 | DIASTOLIC BLOOD PRESSURE: 64 MMHG

## 2018-08-28 DIAGNOSIS — I50.30 DIASTOLIC CONGESTIVE HEART FAILURE, UNSPECIFIED HF CHRONICITY (HCC): Chronic | ICD-10-CM

## 2018-08-28 DIAGNOSIS — E87.6 HYPOKALEMIA: Primary | ICD-10-CM

## 2018-08-28 DIAGNOSIS — I10 ESSENTIAL HYPERTENSION: Chronic | ICD-10-CM

## 2018-08-28 DIAGNOSIS — G58.8 OTHER MONONEUROPATHY: Chronic | ICD-10-CM

## 2018-08-28 DIAGNOSIS — E11.9 TYPE 2 DIABETES MELLITUS WITHOUT COMPLICATION, WITHOUT LONG-TERM CURRENT USE OF INSULIN (HCC): ICD-10-CM

## 2018-08-28 DIAGNOSIS — I95.2 HYPOTENSION DUE TO DRUGS: ICD-10-CM

## 2018-08-28 DIAGNOSIS — E87.6 HYPOKALEMIA: ICD-10-CM

## 2018-08-28 DIAGNOSIS — N18.30 CKD (CHRONIC KIDNEY DISEASE), STAGE III (HCC): ICD-10-CM

## 2018-08-28 LAB
ANION GAP SERPL CALCULATED.3IONS-SCNC: 15 MMOL/L (ref 3–16)
BUN BLDV-MCNC: 21 MG/DL (ref 7–20)
CALCIUM SERPL-MCNC: 9.7 MG/DL (ref 8.3–10.6)
CHLORIDE BLD-SCNC: 100 MMOL/L (ref 99–110)
CO2: 26 MMOL/L (ref 21–32)
CREAT SERPL-MCNC: 1 MG/DL (ref 0.6–1.2)
GFR AFRICAN AMERICAN: >60
GFR NON-AFRICAN AMERICAN: 52
GLUCOSE BLD-MCNC: 239 MG/DL (ref 70–99)
POTASSIUM SERPL-SCNC: 4.2 MMOL/L (ref 3.5–5.1)
SODIUM BLD-SCNC: 141 MMOL/L (ref 136–145)

## 2018-08-28 PROCEDURE — 99214 OFFICE O/P EST MOD 30 MIN: CPT | Performed by: FAMILY MEDICINE

## 2018-08-28 PROCEDURE — 1111F DSCHRG MED/CURRENT MED MERGE: CPT | Performed by: FAMILY MEDICINE

## 2018-08-28 RX ORDER — GLIPIZIDE 10 MG/1
10 TABLET, FILM COATED, EXTENDED RELEASE ORAL DAILY
Qty: 30 TABLET | Refills: 3 | Status: SHIPPED | OUTPATIENT
Start: 2018-08-28 | End: 2018-12-12 | Stop reason: SDUPTHER

## 2018-08-28 RX ORDER — SPIRONOLACTONE 25 MG/1
25 TABLET ORAL DAILY
Qty: 30 TABLET | Refills: 0 | Status: SHIPPED | OUTPATIENT
Start: 2018-08-28 | End: 2018-09-07 | Stop reason: ALTCHOICE

## 2018-08-28 RX ORDER — POTASSIUM CHLORIDE 20 MEQ/1
20 TABLET, EXTENDED RELEASE ORAL DAILY
Qty: 6 TABLET | Refills: 0 | Status: CANCELLED | OUTPATIENT
Start: 2018-08-28 | End: 2018-08-31

## 2018-08-28 ASSESSMENT — ENCOUNTER SYMPTOMS
NAUSEA: 0
CHEST TIGHTNESS: 0
ALLERGIC/IMMUNOLOGIC NEGATIVE: 1
RHINORRHEA: 0
VOMITING: 0
CONSTIPATION: 0
DIARRHEA: 0
ABDOMINAL PAIN: 0
SORE THROAT: 0
COUGH: 0
SINUS PRESSURE: 0
BLOOD IN STOOL: 0
BACK PAIN: 0
ABDOMINAL DISTENTION: 0
SHORTNESS OF BREATH: 0
WHEEZING: 0

## 2018-08-28 NOTE — PROGRESS NOTES
pressure and sore throat. Eyes: Negative for visual disturbance. Respiratory: Negative for cough, chest tightness, shortness of breath and wheezing. Cardiovascular: Negative for chest pain, palpitations and leg swelling. Gastrointestinal: Negative for abdominal distention, abdominal pain, blood in stool, constipation, diarrhea, nausea and vomiting. Endocrine: Negative. Genitourinary: Negative for dysuria, flank pain, frequency, hematuria and urgency. Musculoskeletal: Positive for arthralgias and joint swelling. Negative for back pain. Pain neck hands knees hips and feet,   Basis of left thumb is sore and red and tender, no fever   Skin: Negative for rash. Allergic/Immunologic: Negative. Neurological: Positive for numbness. Negative for dizziness, tremors, weakness and headaches. Bilat feet , mild since 2011  intermittent hands and lips   Psychiatric/Behavioral: Negative. Negative for agitation, behavioral problems, confusion, decreased concentration, dysphoric mood, hallucinations, sleep disturbance and suicidal ideas. The patient is not nervous/anxious and is not hyperactive. Vitals:    08/28/18 0923   BP: 104/64   Pulse: 70   Resp: 16   Weight: 149 lb 9.6 oz (67.9 kg)     Body mass index is 29.22 kg/m². Wt Readings from Last 3 Encounters:   08/28/18 149 lb 9.6 oz (67.9 kg)   08/24/18 147 lb 4.8 oz (66.8 kg)   08/14/18 153 lb 6 oz (69.6 kg)     BP Readings from Last 3 Encounters:   08/28/18 104/64   08/26/18 (!) 106/58   08/14/18 116/70       Physical Exam   Constitutional: She is oriented to person, place, and time. She appears well-developed and well-nourished. no orthostasis   HENT:   Head: Normocephalic and atraumatic. Right Ear: External ear normal.   Left Ear: External ear normal.   Nose: Nose normal.   Mouth/Throat: Oropharynx is clear and moist.   Eyes: Pupils are equal, round, and reactive to light.  Conjunctivae and EOM are normal.   Neck: Normal range of stop k+ supplement and recheck one week  - Basic Metabolic Panel; Future    2. Diastolic congestive heart failure, unspecified HF chronicity (HCC)  Stable well compensated     3. Type 2 diabetes mellitus without complication, without long-term current use of insulin (HCC)  Sugars running 250 to 300 in evening. On januvia, does not tolerate metformin and would prefer to avoid insulin. Glucotrol in am  - Basic Metabolic Panel; Future    4. Essential hypertension  bp low stop cozaar, pt on aldactone for chf and K+    5. Hypotension due to drugs  Stop cozaar    6. Other mononeuropathy  Numb feet stable    7.  CKD (chronic kidney disease), stage III  Retest now and in one week off meds        Medical Decision Making: high complexity

## 2018-08-28 NOTE — CARE COORDINATION
Ambulatory Care Coordination Note  CM Risk Score: 5  [place] Mortality Risk Score: 23.79    ACC: Pilar Rojas RN    Summary Note: Met with pt and aide Joan at [facility]. PCP stopped cozaar today and made other med changes that were reviewed with pt aide. Home care to be out wednesday, no scale obtained at this time. CC will f/u on this. Aide plans to contact home nurse who manages meds and make aware of changes so med box can be updated. Has home nurse once weekly, aide daily. Typically drinks about 64 oz water daily. FBS has been approx 210-248 daily. Reviewed diab plate method. Reviewed chf zone management with pt and aide. Inquiring about if they should check pt bp at home and if so will need bp cuff. FBS have ranged from 210-248. Pt has f/u ov with pcp next Wednesday. Advised to call if would have any needs/concerns. Care Coordination Interventions    Program Enrollment:  Rising Risk  Referral from Primary Care Provider:  No  Suggested Interventions and Community Resources  Fall Risk Prevention: In Process  Home Health Services:  Completed  Zone Management Tools: In Process         Goals Addressed             Most Recent     Conditions and Symptoms   On track (8/28/2018)             I will notify my provider of any symptoms that indicate a worsening of my condition. Barriers: none  Plan for overcoming my barriers: N/A  Confidence: 9/10  Anticipated Goal Completion Date: 10/23/17              Prior to Admission medications    Medication Sig Start Date End Date Taking?  Authorizing Provider   spironolactone (ALDACTONE) 25 MG tablet Take 1 tablet by mouth 2 times daily 9/7/18   Paytno Brewer MD   furosemide (LASIX) 40 MG tablet Take 1 tablet by mouth daily 9/7/18   Payton Brewer MD   UNABLE TO FIND Provide one electronic blood pressure cuff for home monitoring 8/29/18   Payton Brewer MD   glipiZIDE (GLUCOTROL XL) 10 MG extended release tablet Take 1 tablet by mouth daily 8/28/18 Appointments  Date Time Provider Ancelmo Maggie   9/14/2018 2:15 PM Michael Moffett MD [facility]

## 2018-08-29 ENCOUNTER — CARE COORDINATION (OUTPATIENT)
Dept: CASE MANAGEMENT | Age: 83
End: 2018-08-29

## 2018-08-29 ENCOUNTER — CARE COORDINATION (OUTPATIENT)
Dept: CARE COORDINATION | Age: 83
End: 2018-08-29

## 2018-08-29 ENCOUNTER — HOSPITAL ENCOUNTER (OUTPATIENT)
Dept: ULTRASOUND IMAGING | Age: 83
Discharge: HOME | End: 2018-08-29
Attending: FAMILY MEDICINE | Admitting: FAMILY MEDICINE

## 2018-08-29 DIAGNOSIS — I50.30 DIASTOLIC CONGESTIVE HEART FAILURE, UNSPECIFIED HF CHRONICITY (HCC): Chronic | ICD-10-CM

## 2018-08-29 DIAGNOSIS — I10 ESSENTIAL HYPERTENSION: Primary | Chronic | ICD-10-CM

## 2018-08-29 DIAGNOSIS — R55 SYNCOPE, UNSPECIFIED SYNCOPE TYPE: ICD-10-CM

## 2018-08-29 NOTE — CARE COORDINATION
Will inform pt of bp parameters per pcp. Pt/aide requesting order for home blood pressure cuff. 1115- pt advised.

## 2018-08-29 NOTE — CARE COORDINATION
Joan 45 Transitions Follow Up Call    2018    Patient: Dar Root  Patient : 8/3/1929   MRN: [id number]  Reason for Admission: There are no discharge diagnoses documented for the most recent discharge. Discharge Date: 18 RARS: Readmission Risk Score: 23       Spoke with:  patient    Care Transitions Subsequent and Final Call    Subsequent and Final Calls  Care Transitions Interventions  Other Interventions: Follow Up:  Spoke with patient. Reports that she has \"not felt good today\". Reports that she was \"weak and jittery\". Discussed safety with ambulation and fall prevention measures. Patient has  not re-checked her BS since this morning , but reports that she did eat lunch and felt \"a little better\". Reports that her [facility] nurse is scheduled to come today and will be bringing her scale. Discussed the benefits of [facility] as a 24/ resource for any change in patient condition or worsening symptoms. Encouraged patient to keep [facility] contact information on the refrigerator for easier access. Patient verbalized understanding. Verbalized her plan to call [facility] to check on status/ time of RN visit. Denied the need for CTC intervention. Denies any questions, equipment or resource needs. Agreeable to continued Care Transition. Follow up VIVEK Spoke with patient. Confirmed that patient was able to get a hold of  [address] to confirm visit. Patient reports that she receives [facility]HA services M-F to assist with homemaking/light housekeeping. Reports that her [employer] aide is present at this time and that her care needs are being met. No concerns voiced. Confirmed CTC contact information. Encouraged her to have patient call if needs arise.     Future Appointments  Date Time Provider Ancelmo Serrano   2018 11:30 AM Aicha Adhikari MD [facility] RICKIE Valle, [place]

## 2018-09-07 ENCOUNTER — TELEPHONE (OUTPATIENT)
Dept: FAMILY MEDICINE CLINIC | Age: 83
End: 2018-09-07

## 2018-09-07 ENCOUNTER — CARE COORDINATION (OUTPATIENT)
Dept: CARE COORDINATION | Age: 83
End: 2018-09-07

## 2018-09-07 ENCOUNTER — CARE COORDINATION (OUTPATIENT)
Dept: CASE MANAGEMENT | Age: 83
End: 2018-09-07

## 2018-09-07 ENCOUNTER — OFFICE VISIT (OUTPATIENT)
Dept: FAMILY MEDICINE CLINIC | Age: 83
End: 2018-09-07

## 2018-09-07 VITALS
BODY MASS INDEX: 30.12 KG/M2 | WEIGHT: 154.2 LBS | HEART RATE: 80 BPM | OXYGEN SATURATION: 96 % | RESPIRATION RATE: 20 BRPM | SYSTOLIC BLOOD PRESSURE: 118 MMHG | DIASTOLIC BLOOD PRESSURE: 64 MMHG

## 2018-09-07 DIAGNOSIS — I50.33 ACUTE ON CHRONIC DIASTOLIC CONGESTIVE HEART FAILURE (HCC): Primary | Chronic | ICD-10-CM

## 2018-09-07 DIAGNOSIS — R06.01 ORTHOPNEA: ICD-10-CM

## 2018-09-07 DIAGNOSIS — N18.30 CKD (CHRONIC KIDNEY DISEASE), STAGE III (HCC): ICD-10-CM

## 2018-09-07 PROCEDURE — 99214 OFFICE O/P EST MOD 30 MIN: CPT | Performed by: FAMILY MEDICINE

## 2018-09-07 RX ORDER — FUROSEMIDE 40 MG/1
40 TABLET ORAL DAILY
Qty: 30 TABLET | Refills: 3 | Status: SHIPPED | OUTPATIENT
Start: 2018-09-07 | End: 2019-01-10 | Stop reason: SDUPTHER

## 2018-09-07 RX ORDER — SPIRONOLACTONE 25 MG/1
25 TABLET ORAL 2 TIMES DAILY
Qty: 60 TABLET | Refills: 3 | Status: SHIPPED | OUTPATIENT
Start: 2018-09-07 | End: 2019-01-10 | Stop reason: SDUPTHER

## 2018-09-07 NOTE — TELEPHONE ENCOUNTER
Donovan Montiel left  on ma line stating that pt has the following symptoms going on:     Light-headedness, leg weakness feeling almost numb, feet & ankles are swollen, she has scales at home and weighs everyday not sure if she has gained weight, Blood sugar this am was 210 and has not been checked since, not short of breath but just not feeling well today.

## 2018-09-07 NOTE — TELEPHONE ENCOUNTER
Leroy Hogan called stating that today at PT patient was having shortness of breath, lower extremity pitting edema, yesterday her weight was 148.2 and today was 154.6.  Vitals were normal, BP & HR were normal.

## 2018-09-10 ENCOUNTER — CARE COORDINATION (OUTPATIENT)
Dept: CARE COORDINATION | Age: 83
End: 2018-09-10

## 2018-09-10 NOTE — CARE COORDINATION
Mailed pt chf ed/low na handouts, (info on which foods are high in sodium foods, nutrition labels) and chf and dm zone management. Maxi Mccarthy

## 2018-09-11 ASSESSMENT — ENCOUNTER SYMPTOMS
BLOOD IN STOOL: 0
BACK PAIN: 1
COUGH: 0
EYES NEGATIVE: 1
DIARRHEA: 0
VOMITING: 0
WHEEZING: 0
SPUTUM PRODUCTION: 0
CONSTIPATION: 0
NAUSEA: 0
HEMOPTYSIS: 0
ORTHOPNEA: 0
ABDOMINAL PAIN: 0
SHORTNESS OF BREATH: 1
HEARTBURN: 1

## 2018-09-12 ENCOUNTER — CARE COORDINATION (OUTPATIENT)
Dept: CASE MANAGEMENT | Age: 83
End: 2018-09-12

## 2018-09-12 NOTE — CARE COORDINATION
Joan 45 Transitions Follow Up Call    2018    Patient: Jany Potter  Patient : 8/3/1929   MRN: [id number]  Reason for Admission: There are no discharge diagnoses documented for the most recent discharge. Discharge Date: 18 RARS: Readmission Risk Score: 23       Spoke with:  patient    Care Transitions Subsequent and Final Call    Schedule Follow Up Appointment with PCP:  Declined  Subsequent and Final Calls  Have your medications changed?:  No  Do you have any questions related to your medications?:  No  Do you currently have any active services?:  No  Are you currently active with any services?:  Home Health  Do you have any needs or concerns that I can assist you with?:  No  Identified Barriers:  None  Care Transitions Interventions  No Identified Needs  Other Interventions: Follow Up:  Spoke with patient. Patient reports that she is doing better. Reports occasional dizziness relieved with Antivert as directed. Patient reports that she is continuing to weigh herself daily and that she has lost weight. Reviewed CHF zone management and s/s to report to MD.      Patient is active with [facility] and reports that her care needs are being met. Discussed the benefits of [place] [id number] as a 24/ resource for any change in patient condition or worsening symptoms. Denies any questions, equipment or resource needs. Informed of final transition call. Patient voiced consent and is agreeable to [address] follow up from this point. Confirmed that patient has CTC contact information. Encouraged her to call if needs arise.    Future Appointments  Date Time Provider Ancelmo Serrano   2018 2:15 PM Alfredo Casillas MD [facility] Sonja Baumgarten, RN

## 2018-09-21 ENCOUNTER — OFFICE VISIT (OUTPATIENT)
Dept: FAMILY MEDICINE CLINIC | Age: 83
End: 2018-09-21

## 2018-09-21 VITALS
WEIGHT: 141.8 LBS | BODY MASS INDEX: 27.69 KG/M2 | RESPIRATION RATE: 18 BRPM | DIASTOLIC BLOOD PRESSURE: 66 MMHG | SYSTOLIC BLOOD PRESSURE: 120 MMHG | OXYGEN SATURATION: 97 % | HEART RATE: 73 BPM

## 2018-09-21 DIAGNOSIS — Z23 NEED FOR INFLUENZA VACCINATION: ICD-10-CM

## 2018-09-21 DIAGNOSIS — E11.9 TYPE 2 DIABETES MELLITUS WITHOUT COMPLICATION, WITHOUT LONG-TERM CURRENT USE OF INSULIN (HCC): ICD-10-CM

## 2018-09-21 DIAGNOSIS — K59.04 CHRONIC IDIOPATHIC CONSTIPATION: ICD-10-CM

## 2018-09-21 DIAGNOSIS — I10 ESSENTIAL HYPERTENSION: Chronic | ICD-10-CM

## 2018-09-21 DIAGNOSIS — I50.33 ACUTE ON CHRONIC DIASTOLIC CONGESTIVE HEART FAILURE (HCC): Primary | Chronic | ICD-10-CM

## 2018-09-21 PROCEDURE — G0008 ADMIN INFLUENZA VIRUS VAC: HCPCS | Performed by: FAMILY MEDICINE

## 2018-09-21 PROCEDURE — 99214 OFFICE O/P EST MOD 30 MIN: CPT | Performed by: FAMILY MEDICINE

## 2018-09-21 PROCEDURE — 90686 IIV4 VACC NO PRSV 0.5 ML IM: CPT | Performed by: FAMILY MEDICINE

## 2018-09-21 RX ORDER — DOCUSATE SODIUM 100 MG/1
CAPSULE, LIQUID FILLED ORAL
Qty: 60 CAPSULE | Refills: 4 | Status: SHIPPED | OUTPATIENT
Start: 2018-09-21 | End: 2019-03-20 | Stop reason: SDUPTHER

## 2018-09-21 NOTE — PROGRESS NOTES
Date    CHOL 182 06/06/2018    TRIG 249 (H) 06/06/2018    HDL 52 06/06/2018    [address] 80 06/06/2018    LDLDIRECT 99 08/08/2012          Review of Systems   Constitutional: Negative for activity change, appetite change, fatigue and fever. HENT: Negative for congestion, dental problem, ear pain, rhinorrhea, sinus pressure and sore throat. Eyes: Negative for visual disturbance. Respiratory: Negative for cough, chest tightness, shortness of breath and wheezing. Cardiovascular: Negative for chest pain, palpitations and leg swelling. Gastrointestinal: Negative for abdominal distention, abdominal pain, blood in stool, constipation, diarrhea, nausea and vomiting. Endocrine: Negative. Genitourinary: Negative for dysuria, flank pain, frequency, hematuria and urgency. Musculoskeletal: Positive for arthralgias and joint swelling. Negative for back pain. Pain neck hands knees hips and feet,      Skin: Negative for rash. Allergic/Immunologic: Negative. Neurological: Positive for numbness. Negative for dizziness, tremors, weakness and headaches. Bilat feet , mild since 2011  intermittent hands and lips   Psychiatric/Behavioral: Negative. Negative for agitation, behavioral problems, confusion, decreased concentration, dysphoric mood, hallucinations, sleep disturbance and suicidal ideas. The patient is not nervous/anxious and is not hyperactive. Prior to Visit Medications    Medication Sig Taking?  Authorizing Provider   docusate sodium (DOCQLACE) 100 MG capsule TAKE ONE CAPSULE TWO TIMES A DAY AS NEEDED FOR CONSTIPATION Yes Philipp Hargrove MD   spironolactone (ALDACTONE) 25 MG tablet Take 1 tablet by mouth 2 times daily Yes Philipp Hargrove MD   furosemide (LASIX) 40 MG tablet Take 1 tablet by mouth daily Yes Philipp Hargrove MD   UNABLE TO FIND Provide one electronic blood pressure cuff for home monitoring Yes Philipp Hargrove MD   glipiZIDE (GLUCOTROL XL) 10 MG extended release Resp: 18   SpO2: 97%   Weight: 141 lb 12.8 oz (64.3 kg)     Estimated body mass index is 27.69 kg/m² as calculated from the following:    Height as of 8/24/18: 5' (1.524 m). Weight as of this encounter: 141 lb 12.8 oz (64.3 kg). Physical Exam   Constitutional: She is oriented to person, place, and time. She appears well-developed and well-nourished. no orthostasis   HENT:   Head: Normocephalic and atraumatic. Right Ear: External ear normal.   Left Ear: External ear normal.   Nose: Nose normal.   Mouth/Throat: Oropharynx is clear and moist.   Eyes: Pupils are equal, round, and reactive to light. Conjunctivae and EOM are normal.   Neck: Normal range of motion. Neck supple. No JVD present. No tracheal deviation present. No thyromegaly present. Cardiovascular: Normal rate, regular rhythm and intact distal pulses. Murmur heard. Systolic murmur is present with a grade of 3/6    Diastolic murmur is present with a grade of 1/6   Pulmonary/Chest: Effort normal. No accessory muscle usage. No respiratory distress. She has decreased breath sounds in the right lower field. She has no wheezes. She has no rhonchi. She has no rales. Abdominal: Soft. Bowel sounds are normal. She exhibits no distension and no mass. There is no tenderness. There is no rebound and no guarding. Musculoskeletal: She exhibits edema and tenderness. Right knee: She exhibits normal range of motion, no swelling, no effusion, no ecchymosis, no deformity, no laceration, no erythema, no LCL laxity, normal patellar mobility, normal meniscus and no MCL laxity. Tenderness found. Medial joint line and lateral joint line tenderness noted. Left knee: She exhibits normal range of motion, no swelling, no effusion, no ecchymosis, no deformity, no laceration, no erythema, normal alignment, no LCL laxity, normal patellar mobility, no bony tenderness, normal meniscus and no MCL laxity. No tenderness found.         Lumbar back: She

## 2018-09-26 ENCOUNTER — TELEPHONE (OUTPATIENT)
Dept: FAMILY MEDICINE CLINIC | Age: 83
End: 2018-09-26

## 2018-09-29 ASSESSMENT — ENCOUNTER SYMPTOMS
ALLERGIC/IMMUNOLOGIC NEGATIVE: 1
VOMITING: 0
ABDOMINAL PAIN: 0
NAUSEA: 0
ABDOMINAL DISTENTION: 0
SHORTNESS OF BREATH: 0
CONSTIPATION: 0
SINUS PRESSURE: 0
RHINORRHEA: 0
WHEEZING: 0
SORE THROAT: 0
BACK PAIN: 0
CHEST TIGHTNESS: 0
BLOOD IN STOOL: 0
COUGH: 0
DIARRHEA: 0

## 2018-10-18 ENCOUNTER — OFFICE VISIT (OUTPATIENT)
Dept: FAMILY MEDICINE CLINIC | Age: 83
End: 2018-10-18
Payer: MEDICARE

## 2018-10-18 VITALS
HEART RATE: 81 BPM | SYSTOLIC BLOOD PRESSURE: 106 MMHG | DIASTOLIC BLOOD PRESSURE: 64 MMHG | HEIGHT: 60 IN | BODY MASS INDEX: 28.11 KG/M2 | WEIGHT: 143.2 LBS | RESPIRATION RATE: 14 BRPM

## 2018-10-18 DIAGNOSIS — J01.90 ACUTE BACTERIAL SINUSITIS: Primary | ICD-10-CM

## 2018-10-18 DIAGNOSIS — B96.89 ACUTE BACTERIAL SINUSITIS: Primary | ICD-10-CM

## 2018-10-18 PROCEDURE — 99213 OFFICE O/P EST LOW 20 MIN: CPT | Performed by: FAMILY MEDICINE

## 2018-10-18 RX ORDER — DOXYCYCLINE HYCLATE 100 MG
100 TABLET ORAL 2 TIMES DAILY
Qty: 20 TABLET | Refills: 0 | Status: SHIPPED | OUTPATIENT
Start: 2018-10-18 | End: 2018-10-28

## 2018-10-18 ASSESSMENT — ENCOUNTER SYMPTOMS
BLOOD IN STOOL: 0
SORE THROAT: 0
CONSTIPATION: 0
SHORTNESS OF BREATH: 0
COUGH: 1
ABDOMINAL DISTENTION: 0
NAUSEA: 0
SINUS PRESSURE: 1
DIARRHEA: 0
ABDOMINAL PAIN: 0
VOMITING: 0
BACK PAIN: 0
CHEST TIGHTNESS: 0
WHEEZING: 0
RHINORRHEA: 1
ALLERGIC/IMMUNOLOGIC NEGATIVE: 1

## 2018-10-18 ASSESSMENT — PATIENT HEALTH QUESTIONNAIRE - PHQ9
SUM OF ALL RESPONSES TO PHQ QUESTIONS 1-9: 0
SUM OF ALL RESPONSES TO PHQ QUESTIONS 1-9: 0
2. FEELING DOWN, DEPRESSED OR HOPELESS: 0
1. LITTLE INTEREST OR PLEASURE IN DOING THINGS: 0
SUM OF ALL RESPONSES TO PHQ9 QUESTIONS 1 & 2: 0

## 2018-10-18 NOTE — PROGRESS NOTES
swelling, no effusion, no ecchymosis, no deformity, no laceration, no erythema, no LCL laxity, normal patellar mobility, normal meniscus and no MCL laxity. Tenderness found. Medial joint line and lateral joint line tenderness noted. Left knee: She exhibits normal range of motion, no swelling, no effusion, no ecchymosis, no deformity, no laceration, no erythema, normal alignment, no LCL laxity, normal patellar mobility, no bony tenderness, normal meniscus and no MCL laxity. No tenderness found. Lumbar back: She exhibits decreased range of motion, tenderness and pain. She exhibits no bony tenderness, no swelling, no edema, no deformity, no laceration, no spasm and normal pulse. 1+ ankle edema  Neck hands feet, knees tender with joint enlargement       Lymphadenopathy:     She has no cervical adenopathy. Neurological: She is alert and oriented to person, place, and time. She has normal reflexes. Diminished monofilament bilat feet  No nystagmus   Skin: Skin is warm and dry. No rash noted. Psychiatric: She has a normal mood and affect. Her behavior is normal. Judgment and thought content normal.   sad       ASSESSMENT/PLAN:  1. Acute bacterial sinusitis  Failed conservative care add ab  - doxycycline hyclate (VIBRA-TABS) 100 MG tablet; Take 1 tablet by mouth 2 times daily for 10 days  Dispense: 20 tablet; Refill: 0      Return in about 3 months (around 1/18/2019), or if symptoms worsen or fail to improve. An electronic signature was used to authenticate this note.     --Symone Moreno MD on 10/18/2018 at 6:19 PM

## 2018-11-05 ENCOUNTER — TELEPHONE (OUTPATIENT)
Dept: FAMILY MEDICINE CLINIC | Age: 83
End: 2018-11-05

## 2018-11-12 ENCOUNTER — CARE COORDINATION (OUTPATIENT)
Dept: CARE COORDINATION | Age: 83
End: 2018-11-12

## 2018-12-06 RX ORDER — SITAGLIPTIN 50 MG/1
50 TABLET, FILM COATED ORAL DAILY
Qty: 30 TABLET | Refills: 3 | Status: SHIPPED | OUTPATIENT
Start: 2018-12-06 | End: 2019-03-27 | Stop reason: SDUPTHER

## 2018-12-12 DIAGNOSIS — E11.9 TYPE 2 DIABETES MELLITUS WITHOUT COMPLICATION, WITHOUT LONG-TERM CURRENT USE OF INSULIN (HCC): ICD-10-CM

## 2018-12-12 RX ORDER — GLIPIZIDE 10 MG/1
10 TABLET, FILM COATED, EXTENDED RELEASE ORAL DAILY
Qty: 30 TABLET | Refills: 3 | Status: SHIPPED | OUTPATIENT
Start: 2018-12-12 | End: 2019-04-10 | Stop reason: ALTCHOICE

## 2018-12-19 DIAGNOSIS — R14.0 ABDOMINAL DISTENSION: ICD-10-CM

## 2018-12-19 RX ORDER — FAMOTIDINE 40 MG/1
40 TABLET, FILM COATED ORAL NIGHTLY
Qty: 90 TABLET | Refills: 3 | Status: SHIPPED | OUTPATIENT
Start: 2018-12-19 | End: 2019-07-10 | Stop reason: CLARIF

## 2018-12-26 DIAGNOSIS — Z87.39 HISTORY OF GOUT: ICD-10-CM

## 2018-12-26 DIAGNOSIS — E79.0 HYPERURICEMIA: ICD-10-CM

## 2018-12-26 RX ORDER — ALLOPURINOL 100 MG/1
100 TABLET ORAL DAILY
Qty: 30 TABLET | Refills: 5 | Status: SHIPPED | OUTPATIENT
Start: 2018-12-26 | End: 2019-06-26 | Stop reason: SDUPTHER

## 2019-01-09 ENCOUNTER — CARE COORDINATION (OUTPATIENT)
Dept: CARE COORDINATION | Age: 84
End: 2019-01-09

## 2019-01-10 ENCOUNTER — OFFICE VISIT (OUTPATIENT)
Dept: FAMILY MEDICINE CLINIC | Age: 84
End: 2019-01-10
Payer: MEDICARE

## 2019-01-10 VITALS
DIASTOLIC BLOOD PRESSURE: 52 MMHG | TEMPERATURE: 97 F | HEART RATE: 42 BPM | OXYGEN SATURATION: 96 % | SYSTOLIC BLOOD PRESSURE: 92 MMHG | BODY MASS INDEX: 26.56 KG/M2 | RESPIRATION RATE: 16 BRPM | WEIGHT: 136 LBS

## 2019-01-10 DIAGNOSIS — I10 ESSENTIAL HYPERTENSION: Chronic | ICD-10-CM

## 2019-01-10 DIAGNOSIS — E03.9 ACQUIRED HYPOTHYROIDISM: Chronic | ICD-10-CM

## 2019-01-10 DIAGNOSIS — R42 VERTIGO: ICD-10-CM

## 2019-01-10 DIAGNOSIS — R10.9 ABDOMINAL PRESSURE: ICD-10-CM

## 2019-01-10 DIAGNOSIS — N30.90 CYSTITIS: ICD-10-CM

## 2019-01-10 DIAGNOSIS — R30.0 DYSURIA: ICD-10-CM

## 2019-01-10 DIAGNOSIS — I50.33 ACUTE ON CHRONIC DIASTOLIC CONGESTIVE HEART FAILURE (HCC): Chronic | ICD-10-CM

## 2019-01-10 DIAGNOSIS — E11.9 TYPE 2 DIABETES MELLITUS WITHOUT COMPLICATION, WITHOUT LONG-TERM CURRENT USE OF INSULIN (HCC): Primary | ICD-10-CM

## 2019-01-10 DIAGNOSIS — F33.41 MAJOR DEPRESSIVE DISORDER, RECURRENT EPISODE, IN PARTIAL REMISSION (HCC): Chronic | ICD-10-CM

## 2019-01-10 DIAGNOSIS — R60.9 DEPENDENT EDEMA: ICD-10-CM

## 2019-01-10 DIAGNOSIS — Z87.39 HISTORY OF GOUT: ICD-10-CM

## 2019-01-10 DIAGNOSIS — N18.30 CKD (CHRONIC KIDNEY DISEASE), STAGE III (HCC): ICD-10-CM

## 2019-01-10 DIAGNOSIS — I95.2 HYPOTENSION DUE TO DRUGS: ICD-10-CM

## 2019-01-10 DIAGNOSIS — J41.0 SIMPLE CHRONIC BRONCHITIS (HCC): ICD-10-CM

## 2019-01-10 LAB
BASOPHILS ABSOLUTE: 0 K/UL (ref 0–0.2)
BASOPHILS RELATIVE PERCENT: 0.7 %
BILIRUBIN, POC: NEGATIVE
BLOOD URINE, POC: ABNORMAL
CLARITY, POC: ABNORMAL
COLOR, POC: YELLOW
CREATININE URINE POCT: ABNORMAL
EOSINOPHILS ABSOLUTE: 0.2 K/UL (ref 0–0.6)
EOSINOPHILS RELATIVE PERCENT: 3 %
GLUCOSE URINE, POC: NEGATIVE
HBA1C MFR BLD: 6 %
HCT VFR BLD CALC: 36.5 % (ref 36–48)
HEMOGLOBIN: 12.1 G/DL (ref 12–16)
KETONES, POC: NEGATIVE
LEUKOCYTE EST, POC: ABNORMAL
LYMPHOCYTES ABSOLUTE: 1 K/UL (ref 1–5.1)
LYMPHOCYTES RELATIVE PERCENT: 15.4 %
MCH RBC QN AUTO: 28.8 PG (ref 26–34)
MCHC RBC AUTO-ENTMCNC: 33.1 G/DL (ref 31–36)
MCV RBC AUTO: 87 FL (ref 80–100)
MICROALBUMIN/CREAT 24H UR: ABNORMAL MG/G{CREAT}
MICROALBUMIN/CREAT UR-RTO: ABNORMAL
MONOCYTES ABSOLUTE: 0.7 K/UL (ref 0–1.3)
MONOCYTES RELATIVE PERCENT: 11.2 %
NEUTROPHILS ABSOLUTE: 4.7 K/UL (ref 1.7–7.7)
NEUTROPHILS RELATIVE PERCENT: 69.7 %
NITRITE, POC: NEGATIVE
PDW BLD-RTO: 15.3 % (ref 12.4–15.4)
PH, POC: 5.5
PLATELET # BLD: 130 K/UL (ref 135–450)
PMV BLD AUTO: 10 FL (ref 5–10.5)
PROTEIN, POC: ABNORMAL
RBC # BLD: 4.2 M/UL (ref 4–5.2)
SPECIFIC GRAVITY, POC: 1.01
UROBILINOGEN, POC: 0.2
WBC # BLD: 6.7 K/UL (ref 4–11)

## 2019-01-10 PROCEDURE — 82044 UR ALBUMIN SEMIQUANTITATIVE: CPT | Performed by: FAMILY MEDICINE

## 2019-01-10 PROCEDURE — 99214 OFFICE O/P EST MOD 30 MIN: CPT | Performed by: FAMILY MEDICINE

## 2019-01-10 PROCEDURE — 83036 HEMOGLOBIN GLYCOSYLATED A1C: CPT | Performed by: FAMILY MEDICINE

## 2019-01-10 PROCEDURE — 36415 COLL VENOUS BLD VENIPUNCTURE: CPT | Performed by: FAMILY MEDICINE

## 2019-01-10 PROCEDURE — 81002 URINALYSIS NONAUTO W/O SCOPE: CPT | Performed by: FAMILY MEDICINE

## 2019-01-10 RX ORDER — NITROFURANTOIN 25; 75 MG/1; MG/1
100 CAPSULE ORAL 2 TIMES DAILY
Qty: 14 CAPSULE | Refills: 0 | Status: SHIPPED | OUTPATIENT
Start: 2019-01-10 | End: 2019-01-17

## 2019-01-10 RX ORDER — MECLIZINE HYDROCHLORIDE CHEWABLE TABLETS 25 MG/1
25 TABLET, CHEWABLE ORAL 3 TIMES DAILY PRN
Qty: 90 TABLET | Refills: 1 | Status: SHIPPED | OUTPATIENT
Start: 2019-01-10 | End: 2019-07-10 | Stop reason: CLARIF

## 2019-01-10 RX ORDER — SPIRONOLACTONE 25 MG/1
25 TABLET ORAL DAILY
Qty: 30 TABLET | Refills: 5 | Status: SHIPPED | OUTPATIENT
Start: 2019-01-10 | End: 2019-04-10

## 2019-01-10 RX ORDER — FUROSEMIDE 40 MG/1
40 TABLET ORAL DAILY
Qty: 30 TABLET | Refills: 3 | Status: SHIPPED | OUTPATIENT
Start: 2019-01-10 | End: 2019-08-12 | Stop reason: SDUPTHER

## 2019-01-10 RX ORDER — ALBUTEROL SULFATE 90 UG/1
2 AEROSOL, METERED RESPIRATORY (INHALATION) EVERY 6 HOURS PRN
Qty: 3 INHALER | Refills: 3 | Status: SHIPPED | OUTPATIENT
Start: 2019-01-10 | End: 2020-01-15 | Stop reason: SDUPTHER

## 2019-01-10 RX ORDER — SPIRONOLACTONE 25 MG/1
25 TABLET ORAL 2 TIMES DAILY
Qty: 60 TABLET | Refills: 3 | Status: SHIPPED | OUTPATIENT
Start: 2019-01-10 | End: 2019-01-10 | Stop reason: SDUPTHER

## 2019-01-10 ASSESSMENT — ENCOUNTER SYMPTOMS
VOMITING: 0
RHINORRHEA: 0
ABDOMINAL DISTENTION: 0
BACK PAIN: 0
CONSTIPATION: 0
SINUS PRESSURE: 0
NAUSEA: 0
ALLERGIC/IMMUNOLOGIC NEGATIVE: 1
DIARRHEA: 0
COUGH: 0
WHEEZING: 0
SHORTNESS OF BREATH: 0
BLOOD IN STOOL: 0
CHEST TIGHTNESS: 0
ABDOMINAL PAIN: 0
SORE THROAT: 0

## 2019-01-10 ASSESSMENT — PATIENT HEALTH QUESTIONNAIRE - PHQ9
SUM OF ALL RESPONSES TO PHQ QUESTIONS 1-9: 0
2. FEELING DOWN, DEPRESSED OR HOPELESS: 0
SUM OF ALL RESPONSES TO PHQ QUESTIONS 1-9: 0
SUM OF ALL RESPONSES TO PHQ9 QUESTIONS 1 & 2: 0
1. LITTLE INTEREST OR PLEASURE IN DOING THINGS: 0

## 2019-01-11 LAB
A/G RATIO: 2.2 (ref 1.1–2.2)
ALBUMIN SERPL-MCNC: 4.7 G/DL (ref 3.4–5)
ALP BLD-CCNC: 91 U/L (ref 40–129)
ALT SERPL-CCNC: 13 U/L (ref 10–40)
ANION GAP SERPL CALCULATED.3IONS-SCNC: 16 MMOL/L (ref 3–16)
AST SERPL-CCNC: 23 U/L (ref 15–37)
BILIRUB SERPL-MCNC: 0.4 MG/DL (ref 0–1)
BUN BLDV-MCNC: 26 MG/DL (ref 7–20)
CALCIUM SERPL-MCNC: 9.5 MG/DL (ref 8.3–10.6)
CHLORIDE BLD-SCNC: 101 MMOL/L (ref 99–110)
CHOLESTEROL, TOTAL: 153 MG/DL (ref 0–199)
CO2: 27 MMOL/L (ref 21–32)
CREAT SERPL-MCNC: 1.2 MG/DL (ref 0.6–1.2)
GFR AFRICAN AMERICAN: 51
GFR NON-AFRICAN AMERICAN: 42
GLOBULIN: 2.1 G/DL
GLUCOSE BLD-MCNC: 168 MG/DL (ref 70–99)
HDLC SERPL-MCNC: 33 MG/DL (ref 40–60)
LDL CHOLESTEROL CALCULATED: 76 MG/DL
POTASSIUM SERPL-SCNC: 3.7 MMOL/L (ref 3.5–5.1)
SODIUM BLD-SCNC: 144 MMOL/L (ref 136–145)
TOTAL PROTEIN: 6.8 G/DL (ref 6.4–8.2)
TRIGL SERPL-MCNC: 221 MG/DL (ref 0–150)
TSH SERPL DL<=0.05 MIU/L-ACNC: 0.13 UIU/ML (ref 0.27–4.2)
URIC ACID, SERUM: 7.5 MG/DL (ref 2.6–6)
VLDLC SERPL CALC-MCNC: 44 MG/DL

## 2019-01-16 ENCOUNTER — TELEPHONE (OUTPATIENT)
Dept: FAMILY MEDICINE CLINIC | Age: 84
End: 2019-01-16

## 2019-01-17 ENCOUNTER — TELEPHONE (OUTPATIENT)
Dept: FAMILY MEDICINE CLINIC | Age: 84
End: 2019-01-17

## 2019-01-17 DIAGNOSIS — N30.90 CYSTITIS: Primary | ICD-10-CM

## 2019-01-17 RX ORDER — NITROFURANTOIN MACROCRYSTALS 50 MG/1
50 CAPSULE ORAL 4 TIMES DAILY
Qty: 28 CAPSULE | Refills: 0 | Status: SHIPPED | OUTPATIENT
Start: 2019-01-17 | End: 2019-01-24

## 2019-01-18 ENCOUNTER — TELEPHONE (OUTPATIENT)
Dept: FAMILY MEDICINE CLINIC | Age: 84
End: 2019-01-18

## 2019-01-18 DIAGNOSIS — N30.90 CYSTITIS: Primary | ICD-10-CM

## 2019-01-18 RX ORDER — SULFAMETHOXAZOLE AND TRIMETHOPRIM 800; 160 MG/1; MG/1
1 TABLET ORAL 2 TIMES DAILY
Qty: 10 TABLET | Refills: 0 | Status: SHIPPED | OUTPATIENT
Start: 2019-01-18 | End: 2019-01-23

## 2019-02-06 ENCOUNTER — TELEPHONE (OUTPATIENT)
Dept: FAMILY MEDICINE CLINIC | Age: 84
End: 2019-02-06

## 2019-02-07 ENCOUNTER — HOSPITAL ENCOUNTER (OUTPATIENT)
Age: 84
Discharge: HOME | End: 2019-02-07
Payer: MEDICARE

## 2019-02-07 LAB
ALBUMIN SERPL-MCNC: 4.3 GM/DL (ref 3.4–5)
ALP BLD-CCNC: 67 IU/L (ref 40–129)
ALT SERPL-CCNC: 11 U/L (ref 10–40)
ANION GAP SERPL CALCULATED.3IONS-SCNC: 12 MMOL/L (ref 4–16)
AST SERPL-CCNC: 19 IU/L (ref 15–37)
BACTERIA: ABNORMAL /HPF
BASOPHILS ABSOLUTE: 0 K/CU MM
BASOPHILS RELATIVE PERCENT: 0.6 % (ref 0–1)
BILIRUB SERPL-MCNC: 0.3 MG/DL (ref 0–1)
BILIRUBIN URINE: NEGATIVE MG/DL
BLOOD, URINE: NEGATIVE
BUN BLDV-MCNC: 28 MG/DL (ref 6–23)
CALCIUM SERPL-MCNC: 9.1 MG/DL (ref 8.3–10.6)
CAST TYPE: ABNORMAL /HPF
CHLORIDE BLD-SCNC: 103 MMOL/L (ref 99–110)
CLARITY: CLEAR
CO2: 30 MMOL/L (ref 21–32)
COLOR: YELLOW
CREAT SERPL-MCNC: 1.1 MG/DL (ref 0.6–1.1)
CRYSTAL TYPE: ABNORMAL /HPF
DIFFERENTIAL TYPE: ABNORMAL
EOSINOPHILS ABSOLUTE: 0.2 K/CU MM
EOSINOPHILS RELATIVE PERCENT: 4.1 % (ref 0–3)
EPITHELIAL CELLS, UA: ABNORMAL /HPF
GFR AFRICAN AMERICAN: 57 ML/MIN/1.73M2
GFR NON-AFRICAN AMERICAN: 47 ML/MIN/1.73M2
GLUCOSE BLD-MCNC: 138 MG/DL (ref 70–99)
GLUCOSE, URINE: NEGATIVE MG/DL
HCT VFR BLD CALC: 35.9 % (ref 37–47)
HEMOGLOBIN: 11.4 GM/DL (ref 12.5–16)
IMMATURE NEUTROPHIL %: 0.2 % (ref 0–0.43)
KETONES, URINE: NEGATIVE MG/DL
LEUKOCYTE ESTERASE, URINE: NEGATIVE
LYMPHOCYTES ABSOLUTE: 1.2 K/CU MM
LYMPHOCYTES RELATIVE PERCENT: 23 % (ref 24–44)
MCH RBC QN AUTO: 28.9 PG (ref 27–31)
MCHC RBC AUTO-ENTMCNC: 31.8 % (ref 32–36)
MCV RBC AUTO: 91.1 FL (ref 78–100)
MONOCYTES ABSOLUTE: 0.4 K/CU MM
MONOCYTES RELATIVE PERCENT: 8.6 % (ref 0–4)
MUCUS: NEGATIVE HPF
NITRITE URINE, QUANTITATIVE: NEGATIVE
PDW BLD-RTO: 14.7 % (ref 11.7–14.9)
PH, URINE: 5.5 (ref 5–8)
PLATELET # BLD: 118 K/CU MM (ref 140–440)
PMV BLD AUTO: 10.9 FL (ref 7.5–11.1)
POTASSIUM SERPL-SCNC: 3.7 MMOL/L (ref 3.5–5.1)
PROTEIN UA: NEGATIVE MG/DL
RBC # BLD: 3.94 M/CU MM (ref 4.2–5.4)
RBC URINE: ABNORMAL /HPF (ref 0–6)
SEGMENTED NEUTROPHILS ABSOLUTE COUNT: 3.3 K/CU MM
SEGMENTED NEUTROPHILS RELATIVE PERCENT: 63.5 % (ref 36–66)
SODIUM BLD-SCNC: 145 MMOL/L (ref 135–145)
SPECIFIC GRAVITY UA: 1.02 (ref 1–1.03)
TOTAL IMMATURE NEUTOROPHIL: 0.01 K/CU MM
TOTAL PROTEIN: 6.3 GM/DL (ref 6.4–8.2)
UROBILINOGEN, URINE: 0.2 MG/DL (ref 0.2–1)
VOLUME, (UVOL): 12 ML (ref 10–12)
WBC # BLD: 5.1 K/CU MM (ref 4–10.5)
WBC UA: ABNORMAL /HPF (ref 0–5)

## 2019-02-07 PROCEDURE — 80053 COMPREHEN METABOLIC PANEL: CPT

## 2019-02-07 PROCEDURE — 36415 COLL VENOUS BLD VENIPUNCTURE: CPT

## 2019-02-07 PROCEDURE — 81001 URINALYSIS AUTO W/SCOPE: CPT

## 2019-02-07 PROCEDURE — 85025 COMPLETE CBC W/AUTO DIFF WBC: CPT

## 2019-03-11 ENCOUNTER — CARE COORDINATION (OUTPATIENT)
Dept: CARE COORDINATION | Age: 84
End: 2019-03-11

## 2019-03-13 DIAGNOSIS — R05.9 COUGH: ICD-10-CM

## 2019-03-13 RX ORDER — MONTELUKAST SODIUM 10 MG/1
10 TABLET ORAL NIGHTLY
Qty: 90 TABLET | Refills: 3 | Status: SHIPPED | OUTPATIENT
Start: 2019-03-13 | End: 2020-01-15 | Stop reason: SDUPTHER

## 2019-03-20 DIAGNOSIS — K59.04 CHRONIC IDIOPATHIC CONSTIPATION: ICD-10-CM

## 2019-03-21 RX ORDER — DOCUSATE SODIUM 100 MG/1
CAPSULE, LIQUID FILLED ORAL
Qty: 60 CAPSULE | Refills: 4 | Status: SHIPPED | OUTPATIENT
Start: 2019-03-21 | End: 2019-08-12 | Stop reason: SDUPTHER

## 2019-03-27 RX ORDER — SITAGLIPTIN 50 MG/1
50 TABLET, FILM COATED ORAL DAILY
Qty: 30 TABLET | Refills: 3 | Status: SHIPPED | OUTPATIENT
Start: 2019-03-27 | End: 2019-05-30 | Stop reason: SDUPTHER

## 2019-04-10 ENCOUNTER — OFFICE VISIT (OUTPATIENT)
Dept: FAMILY MEDICINE CLINIC | Age: 84
End: 2019-04-10
Payer: MEDICARE

## 2019-04-10 VITALS
WEIGHT: 131.4 LBS | RESPIRATION RATE: 16 BRPM | DIASTOLIC BLOOD PRESSURE: 56 MMHG | HEART RATE: 48 BPM | BODY MASS INDEX: 25.66 KG/M2 | SYSTOLIC BLOOD PRESSURE: 110 MMHG

## 2019-04-10 DIAGNOSIS — E03.9 ACQUIRED HYPOTHYROIDISM: Chronic | ICD-10-CM

## 2019-04-10 DIAGNOSIS — M15.9 PRIMARY OSTEOARTHRITIS INVOLVING MULTIPLE JOINTS: Chronic | ICD-10-CM

## 2019-04-10 DIAGNOSIS — I10 ESSENTIAL HYPERTENSION: Chronic | ICD-10-CM

## 2019-04-10 DIAGNOSIS — F33.41 MAJOR DEPRESSIVE DISORDER, RECURRENT EPISODE, IN PARTIAL REMISSION (HCC): Chronic | ICD-10-CM

## 2019-04-10 DIAGNOSIS — Z87.39 HISTORY OF GOUT: ICD-10-CM

## 2019-04-10 DIAGNOSIS — N18.30 CKD (CHRONIC KIDNEY DISEASE), STAGE III (HCC): ICD-10-CM

## 2019-04-10 DIAGNOSIS — J41.0 SIMPLE CHRONIC BRONCHITIS (HCC): ICD-10-CM

## 2019-04-10 DIAGNOSIS — E79.0 HYPERURICEMIA: ICD-10-CM

## 2019-04-10 DIAGNOSIS — E11.9 TYPE 2 DIABETES MELLITUS WITHOUT COMPLICATION, WITHOUT LONG-TERM CURRENT USE OF INSULIN (HCC): Primary | ICD-10-CM

## 2019-04-10 DIAGNOSIS — D64.9 ANEMIA, UNSPECIFIED TYPE: ICD-10-CM

## 2019-04-10 DIAGNOSIS — R60.9 DEPENDENT EDEMA: ICD-10-CM

## 2019-04-10 DIAGNOSIS — I50.32 CHRONIC DIASTOLIC CONGESTIVE HEART FAILURE (HCC): ICD-10-CM

## 2019-04-10 DIAGNOSIS — K21.9 GASTROESOPHAGEAL REFLUX DISEASE WITHOUT ESOPHAGITIS: ICD-10-CM

## 2019-04-10 DIAGNOSIS — G45.0 VBI (VERTEBROBASILAR INSUFFICIENCY): ICD-10-CM

## 2019-04-10 DIAGNOSIS — F51.01 PRIMARY INSOMNIA: ICD-10-CM

## 2019-04-10 PROBLEM — R53.83 FATIGUE: Status: RESOLVED | Noted: 2017-07-19 | Resolved: 2019-04-10

## 2019-04-10 LAB
A/G RATIO: 1.9 (ref 1.1–2.2)
ALBUMIN SERPL-MCNC: 4.5 G/DL (ref 3.4–5)
ALP BLD-CCNC: 78 U/L (ref 40–129)
ALT SERPL-CCNC: 14 U/L (ref 10–40)
ANION GAP SERPL CALCULATED.3IONS-SCNC: 16 MMOL/L (ref 3–16)
AST SERPL-CCNC: 22 U/L (ref 15–37)
BASOPHILS ABSOLUTE: 0.1 K/UL (ref 0–0.2)
BASOPHILS RELATIVE PERCENT: 1.3 %
BILIRUB SERPL-MCNC: 0.4 MG/DL (ref 0–1)
BUN BLDV-MCNC: 25 MG/DL (ref 7–20)
CALCIUM SERPL-MCNC: 9.3 MG/DL (ref 8.3–10.6)
CHLORIDE BLD-SCNC: 101 MMOL/L (ref 99–110)
CHOLESTEROL, TOTAL: 132 MG/DL (ref 0–199)
CO2: 26 MMOL/L (ref 21–32)
CREAT SERPL-MCNC: 1 MG/DL (ref 0.6–1.2)
EOSINOPHILS ABSOLUTE: 0.2 K/UL (ref 0–0.6)
EOSINOPHILS RELATIVE PERCENT: 3.4 %
GFR AFRICAN AMERICAN: >60
GFR NON-AFRICAN AMERICAN: 52
GLOBULIN: 2.4 G/DL
GLUCOSE BLD-MCNC: 192 MG/DL (ref 70–99)
HCT VFR BLD CALC: 36.2 % (ref 36–48)
HDLC SERPL-MCNC: 41 MG/DL (ref 40–60)
HEMOGLOBIN: 12.2 G/DL (ref 12–16)
LDL CHOLESTEROL CALCULATED: 59 MG/DL
LYMPHOCYTES ABSOLUTE: 0.9 K/UL (ref 1–5.1)
LYMPHOCYTES RELATIVE PERCENT: 17.3 %
MCH RBC QN AUTO: 30.2 PG (ref 26–34)
MCHC RBC AUTO-ENTMCNC: 33.6 G/DL (ref 31–36)
MCV RBC AUTO: 89.9 FL (ref 80–100)
MONOCYTES ABSOLUTE: 0.4 K/UL (ref 0–1.3)
MONOCYTES RELATIVE PERCENT: 7.5 %
NEUTROPHILS ABSOLUTE: 3.7 K/UL (ref 1.7–7.7)
NEUTROPHILS RELATIVE PERCENT: 70.5 %
PDW BLD-RTO: 15.1 % (ref 12.4–15.4)
PLATELET # BLD: 124 K/UL (ref 135–450)
PMV BLD AUTO: 10.3 FL (ref 5–10.5)
POTASSIUM SERPL-SCNC: 3.2 MMOL/L (ref 3.5–5.1)
RBC # BLD: 4.02 M/UL (ref 4–5.2)
SODIUM BLD-SCNC: 143 MMOL/L (ref 136–145)
T4 FREE: 1.8 NG/DL (ref 0.9–1.8)
TOTAL PROTEIN: 6.9 G/DL (ref 6.4–8.2)
TRIGL SERPL-MCNC: 162 MG/DL (ref 0–150)
TSH SERPL DL<=0.05 MIU/L-ACNC: 0.18 UIU/ML (ref 0.27–4.2)
URIC ACID, SERUM: 6.3 MG/DL (ref 2.6–6)
VLDLC SERPL CALC-MCNC: 32 MG/DL
WBC # BLD: 5.3 K/UL (ref 4–11)

## 2019-04-10 PROCEDURE — 99214 OFFICE O/P EST MOD 30 MIN: CPT | Performed by: FAMILY MEDICINE

## 2019-04-10 RX ORDER — SPIRONOLACTONE 25 MG/1
25 TABLET ORAL DAILY PRN
Qty: 30 TABLET | Refills: 5 | Status: SHIPPED | OUTPATIENT
Start: 2019-04-10 | End: 2020-01-15 | Stop reason: SDUPTHER

## 2019-04-10 RX ORDER — OMEPRAZOLE 20 MG/1
20 CAPSULE, DELAYED RELEASE ORAL DAILY
Qty: 30 CAPSULE | Refills: 11 | Status: SHIPPED | OUTPATIENT
Start: 2019-04-10 | End: 2020-04-17 | Stop reason: SDUPTHER

## 2019-04-10 RX ORDER — TRAZODONE HYDROCHLORIDE 50 MG/1
50 TABLET ORAL NIGHTLY
Qty: 30 TABLET | Refills: 11 | Status: SHIPPED | OUTPATIENT
Start: 2019-04-10 | End: 2019-05-02 | Stop reason: SDUPTHER

## 2019-04-10 ASSESSMENT — PATIENT HEALTH QUESTIONNAIRE - PHQ9
SUM OF ALL RESPONSES TO PHQ9 QUESTIONS 1 & 2: 1
SUM OF ALL RESPONSES TO PHQ QUESTIONS 1-9: 1
2. FEELING DOWN, DEPRESSED OR HOPELESS: 1
1. LITTLE INTEREST OR PLEASURE IN DOING THINGS: 0
SUM OF ALL RESPONSES TO PHQ QUESTIONS 1-9: 1

## 2019-04-10 NOTE — PROGRESS NOTES
4/10/2019     Otf Solo (:  8/3/1929) is a 80 y.o. female, here for evaluation of the following medical concerns:  Chief Complaint   Patient presents with    Diabetes     Pt is here for 3 month f/u.  Hypertension     Still having intermittent dizziness and light headedness. /56 today.  Thyroid Problem    Hyperlipidemia    Gout    Congestive Heart Failure       HPI  Type of Study      TTE procedure:ECHOCARDIOGRAM COMPLETE 2D W DOPPLER W COLOR.     Procedure Date  Date: 2018 Start: 08:02 AM    Study Location: Portable  Technical Quality: Limited visualization due to lung interface. Indications:Congestive heart failure. Patient Status: Routine    Height: 60 inches Weight: 153 pounds BSA: 1.67 m2 BMI: 29.88 kg/m2     Conclusions      Summary   Technically Difficult Study.   Mild concentric LVH with normal systolic function. EF is 50-55 %   Impaired relaxation compatible with diastolic dysfunction.   The left atrium is Mildly dilated.   Mitral and aortic valve sclerosis without stenosis.   No evidence of pericardial effusion.      Signature      ------------------------------------------------------------------   Electronically signed by Eulis Najjar MD   (Interpreting physician) on 2018 at 06:06 PM   ------------------------------------------------------------------   Review of Systems    Prior to Visit Medications    Medication Sig Taking?  Authorizing Provider   JANUVIA 50 MG tablet TAKE 1 TABLET BY MOUTH DAILY Yes Rose Rubi MD   docusate sodium (DOK) 100 MG capsule TAKE ONE CAPSULE TWO TIMES A DAY AS NEEDED FOR CONSTIPATION Yes Rose Rubi MD   montelukast (SINGULAIR) 10 MG tablet TAKE 1 TABLET BY MOUTH NIGHTLY Yes Rose Rubi MD   albuterol sulfate HFA (VENTOLIN HFA) 108 (90 Base) MCG/ACT inhaler Inhale 2 puffs into the lungs every 6 hours as needed for Wheezing Yes Rose Rubi MD   meclizine (ANTIVERT) 25 MG CHEW Take 1 Vitals:    04/10/19 1055   BP: (!) 110/56   Pulse: (!) 48   Resp: 16   Weight: 131 lb 6.4 oz (59.6 kg)     Estimated body mass index is 25.66 kg/m² as calculated from the following:    Height as of 10/18/18: 5' (1.524 m). Weight as of this encounter: 131 lb 6.4 oz (59.6 kg). Physical Exam    ASSESSMENT/PLAN:      2. Type 2 diabetes mellitus without complication, without long-term current use of insulin ([facility])  Sugars low, d/c glucotrol    3.chronic diastolic congestive heart failure ([facility] [address]  Well compensated. Wean aldactone to prn swelling/dsypnea  - spironolactone (ALDACTONE) 25 MG tablet; Take 1 tablet by mouth daily as needed (swelling)  Dispense: 30 tablet; Refill: 5    4. Essential hypertension  bp low stop daily aldactone and reevaluate  - spironolactone (ALDACTONE) 25 MG tablet; Take 1 tablet by mouth daily as needed (swelling)  Dispense: 30 tablet; Refill: 5  - Comprehensive Metabolic Panel; Future  - Lipid Panel; Future    5. Dependent edema  Wean aldactone edema better  - spironolactone (ALDACTONE) 25 MG tablet; Take 1 tablet by mouth daily as needed (swelling)  Dispense: 30 tablet; Refill: 5    6. VBI (vertebrobasilar insufficiency)  better    7. Major depressive disorder, recurrent episode, in partial remission ([facility] [address]  better    8. Simple chronic bronchitis ([facility])  Breathing well    9. CKD (chronic kidney disease), stage III ([facility])  retest    10. Hyperuricemia  - Comprehensive Metabolic Panel; Future  - Uric Acid; Future    11. History of gout    - Comprehensive Metabolic Panel; Future  - Uric Acid; Future    12. Acquired hypothyroidism    - TSH without Reflex; Future  - T4, Free; Future    13. Primary osteoarthritis involving multiple joints  counselled    14. Primary insomnia    - traZODone (DESYREL) 50 MG tablet; Take 1 tablet by mouth nightly  Dispense: 30 tablet; Refill: 11    15. Gastroesophageal reflux disease without esophagitis    - omeprazole (PRILOSEC) 20 MG delayed release capsule;  Take 1 capsule by mouth Daily  Dispense: 30 capsule; Refill: 11    16. Anemia, unspecified type    - CBC Auto Differential; Future      No follow-ups on file. An electronic signature was used to authenticate this note.     --Verner Lander, MD on 4/10/2019 at 11:41 AM

## 2019-05-01 DIAGNOSIS — F51.01 PRIMARY INSOMNIA: ICD-10-CM

## 2019-05-02 RX ORDER — TRAZODONE HYDROCHLORIDE 50 MG/1
50 TABLET ORAL NIGHTLY
Qty: 30 TABLET | Refills: 11 | Status: SHIPPED | OUTPATIENT
Start: 2019-05-02 | End: 2020-07-30 | Stop reason: SDUPTHER

## 2019-05-13 ENCOUNTER — CARE COORDINATION (OUTPATIENT)
Dept: CARE COORDINATION | Age: 84
End: 2019-05-13

## 2019-05-13 NOTE — CARE COORDINATION
Ambulatory Care Coordination Note  CM Risk Score: 8  [facility] Mortality Risk Score: 36.26    ACC: Norma Delcid RN    Summary Note: Call to pt for cc f.u. Reports \"No not really, nothing new\" in regard to if pt having any symptoms/concerns. Reports has good and bad days, and she is \"not doing too bad\". Reports her bp weds was a little low and nurse encouraged her to drink fluids and she has been doing that. Home nurse comes every wednesday and also checks med box. Son also helps pt at home. reviewed weighing daily with pt. Reports she recently \"Fell on her butt\", was just sore, no cuts or bruises, walking around ok. Declines concerns or need to be seen, advise to call if symptoms arise. Denies needs/concerns, reports she will call if any arise. Will continue to follow. Care Coordination Interventions    Program Enrollment:  Rising Risk  Referral from Primary Care Provider:  No  Suggested Interventions and Community Resources  Fall Risk Prevention: In Process  Home Health Services:  Completed  Zone Management Tools: In Process         Goals Addressed                 This Visit's Progress     Conditions and Symptoms   On track     I will notify my provider of any symptoms that indicate a worsening of my condition. Barriers: none  Plan for overcoming my barriers: N/A  Confidence: 9/10  Anticipated Goal Completion Date: 3/23/19              Prior to Admission medications    Medication Sig Start Date End Date Taking?  Authorizing Provider   traZODone (DESYREL) 50 MG tablet Take 1 tablet by mouth nightly 5/2/19   Richie Parra MD   omeprazole (PRILOSEC) 20 MG delayed release capsule Take 1 capsule by mouth Daily 4/10/19   Richie Parra MD   spironolactone (ALDACTONE) 25 MG tablet Take 1 tablet by mouth daily as needed (swelling) 4/10/19   Richie Parra MD   JANUVIA 50 MG tablet TAKE 1 TABLET BY MOUTH DAILY 3/27/19   Richie Parra MD   docusate sodium (DOK) 100 MG capsule TAKE ONE CAPSULE TWO TIMES A DAY AS NEEDED FOR CONSTIPATION 3/21/19   Arthur Bruno MD   montelukast (SINGULAIR) 10 MG tablet TAKE 1 TABLET BY MOUTH NIGHTLY 3/13/19   Arthur Bruno MD   albuterol sulfate HFA (VENTOLIN HFA) 108 (90 Base) MCG/ACT inhaler Inhale 2 puffs into the lungs every 6 hours as needed for Wheezing 1/10/19   Arthur Bruno MD   meclizine (ANTIVERT) 25 MG CHEW Take 1 tablet by mouth 3 times daily as needed (dizziness) 1/10/19   Arthur Bruno MD   furosemide (LASIX) 40 MG tablet Take 1 tablet by mouth daily 1/10/19   Arthur Bruno MD   allopurinol (ZYLOPRIM) 100 MG tablet TAKE 1 TABLET BY MOUTH DAILY 12/26/18   Arthur Bruno MD   famotidine (PEPCID) 40 MG tablet TAKE 1 TABLET BY MOUTH NIGHTLY 12/19/18   Arthur Bruno MD   UNABLE TO FIND Provide one electronic blood pressure cuff for home monitoring 8/29/18   Arthur Bruno MD   Blood Glucose Monitoring Suppl ([employer] BLOOD GLUCOSE MONITOR) STEWART Use as directed for daily blood sugar testing 8/21/18   Arthur Bruno MD   blood glucose test strips [facility] BLOOD GLUCOSE TEST) strip 1 each by In Vitro route daily As needed. Patient taking differently: 1 each by In Vitro route 2 times daily As needed. 8/21/18   Arthur Bruno MD   venlafaxine (EFFEXOR XR) 75 MG extended release capsule Take 1 capsule by mouth daily 7/12/18   Arthur Bruno MD   levothyroxine (SYNTHROID) 100 MCG tablet Take 1 tablet by mouth Daily 7/12/18   Arthur Bruno MD   lovastatin (MEVACOR) 40 MG tablet TAKE ONE TABLET DAILY 6/13/18   Arthur Bruno MD   acetaminophen (TYLENOL) 650 MG CR tablet Take 1 tablet by mouth every 8 hours as needed for Pain 3/1/16   Arthur Bruno MD   Misc. Devices (TUB TRANSFER BOARD) MISC Use as directed 11/11/15   Arthur Bruno MD   aspirin EC 81 MG EC tablet Take 1 tablet by mouth daily.  9/16/14   Arthur Bruno MD       Future Appointments   Date Time Provider Ancelmo Serrano   7/10/2019 10:15 AM Ronit Parrish

## 2019-05-15 ENCOUNTER — TELEPHONE (OUTPATIENT)
Dept: FAMILY MEDICINE CLINIC | Age: 84
End: 2019-05-15

## 2019-05-15 NOTE — TELEPHONE ENCOUNTER
Chani from [facility] left message on MA line stating Pt had fell over the weekend on her bottom but had no injuries.     Just an [employer] [place]

## 2019-06-12 RX ORDER — LOVASTATIN 40 MG/1
TABLET ORAL
Qty: 90 TABLET | Refills: 3 | Status: SHIPPED | OUTPATIENT
Start: 2019-06-12 | End: 2020-06-25 | Stop reason: SDUPTHER

## 2019-06-26 DIAGNOSIS — Z87.39 HISTORY OF GOUT: ICD-10-CM

## 2019-06-26 DIAGNOSIS — E79.0 HYPERURICEMIA: ICD-10-CM

## 2019-06-26 RX ORDER — ALLOPURINOL 100 MG/1
100 TABLET ORAL DAILY
Qty: 30 TABLET | Refills: 5 | Status: SHIPPED | OUTPATIENT
Start: 2019-06-26 | End: 2019-12-17 | Stop reason: SDUPTHER

## 2019-07-10 ENCOUNTER — CARE COORDINATION (OUTPATIENT)
Dept: CARE COORDINATION | Age: 84
End: 2019-07-10

## 2019-07-10 ENCOUNTER — OFFICE VISIT (OUTPATIENT)
Dept: FAMILY MEDICINE CLINIC | Age: 84
End: 2019-07-10
Payer: MEDICARE

## 2019-07-10 VITALS
WEIGHT: 134.25 LBS | HEIGHT: 60 IN | OXYGEN SATURATION: 98 % | HEART RATE: 72 BPM | BODY MASS INDEX: 26.35 KG/M2 | SYSTOLIC BLOOD PRESSURE: 106 MMHG | DIASTOLIC BLOOD PRESSURE: 66 MMHG | RESPIRATION RATE: 18 BRPM

## 2019-07-10 DIAGNOSIS — I50.32 CHRONIC DIASTOLIC CONGESTIVE HEART FAILURE (HCC): ICD-10-CM

## 2019-07-10 DIAGNOSIS — Z51.81 THERAPEUTIC DRUG MONITORING: ICD-10-CM

## 2019-07-10 DIAGNOSIS — N18.30 CKD (CHRONIC KIDNEY DISEASE), STAGE III (HCC): Primary | ICD-10-CM

## 2019-07-10 DIAGNOSIS — I10 ESSENTIAL HYPERTENSION: Chronic | ICD-10-CM

## 2019-07-10 DIAGNOSIS — E79.0 HYPERURICEMIA: ICD-10-CM

## 2019-07-10 DIAGNOSIS — I95.0 IDIOPATHIC HYPOTENSION: ICD-10-CM

## 2019-07-10 DIAGNOSIS — E11.9 TYPE 2 DIABETES MELLITUS WITHOUT COMPLICATION, WITHOUT LONG-TERM CURRENT USE OF INSULIN (HCC): ICD-10-CM

## 2019-07-10 DIAGNOSIS — J41.0 SIMPLE CHRONIC BRONCHITIS (HCC): ICD-10-CM

## 2019-07-10 DIAGNOSIS — F33.41 MAJOR DEPRESSIVE DISORDER, RECURRENT EPISODE, IN PARTIAL REMISSION (HCC): ICD-10-CM

## 2019-07-10 LAB
ANION GAP SERPL CALCULATED.3IONS-SCNC: 13 MMOL/L (ref 3–16)
BUN BLDV-MCNC: 19 MG/DL (ref 7–20)
CALCIUM SERPL-MCNC: 9.9 MG/DL (ref 8.3–10.6)
CHLORIDE BLD-SCNC: 100 MMOL/L (ref 99–110)
CO2: 29 MMOL/L (ref 21–32)
CREAT SERPL-MCNC: 1 MG/DL (ref 0.6–1.2)
GFR AFRICAN AMERICAN: >60
GFR NON-AFRICAN AMERICAN: 52
GLUCOSE BLD-MCNC: 122 MG/DL (ref 70–99)
MAGNESIUM: 1.8 MG/DL (ref 1.8–2.4)
POTASSIUM SERPL-SCNC: 4.3 MMOL/L (ref 3.5–5.1)
SODIUM BLD-SCNC: 142 MMOL/L (ref 136–145)
URIC ACID, SERUM: 5.1 MG/DL (ref 2.6–6)

## 2019-07-10 PROCEDURE — 99214 OFFICE O/P EST MOD 30 MIN: CPT | Performed by: FAMILY MEDICINE

## 2019-07-10 PROCEDURE — 36415 COLL VENOUS BLD VENIPUNCTURE: CPT | Performed by: FAMILY MEDICINE

## 2019-07-10 ASSESSMENT — ENCOUNTER SYMPTOMS
NAUSEA: 0
BLOOD IN STOOL: 0
BACK PAIN: 0
SHORTNESS OF BREATH: 0
SINUS PRESSURE: 0
CONSTIPATION: 0
ABDOMINAL DISTENTION: 0
CHEST TIGHTNESS: 0
ALLERGIC/IMMUNOLOGIC NEGATIVE: 1
ABDOMINAL PAIN: 0
COUGH: 0
WHEEZING: 0
VOMITING: 0
SORE THROAT: 0
RHINORRHEA: 0
DIARRHEA: 0

## 2019-07-10 ASSESSMENT — PATIENT HEALTH QUESTIONNAIRE - PHQ9
1. LITTLE INTEREST OR PLEASURE IN DOING THINGS: 0
SUM OF ALL RESPONSES TO PHQ QUESTIONS 1-9: 0
SUM OF ALL RESPONSES TO PHQ QUESTIONS 1-9: 0
2. FEELING DOWN, DEPRESSED OR HOPELESS: 0
SUM OF ALL RESPONSES TO PHQ9 QUESTIONS 1 & 2: 0

## 2019-07-10 NOTE — PROGRESS NOTES
7/10/2019     Maxwell Vera (:  8/3/1929) is a 80 y.o. female, here for evaluation of the following medical concerns:    Patient presents with:  3 Month Follow-Up: Pt is here for a 3 month follow up    Generally doing pretty good    Sugars remain well controlled with no symptomatic hypoglycemia    htn  /66 (Site: Left Upper Arm, Position: Sitting, Cuff Size: Medium Adult)   Pulse 72   Resp 18   Ht 5' (1.524 m)   Wt 134 lb 4 oz (60.9 kg)   SpO2 98%   BMI 26.22 kg/m²       Walks with walker  No falls    Son diagnosed with cancer. In his throat. Depression doing OK despite this stress    History of chf but well controlled/compensated. Hx CKD III  Improved last visit but on aldactone so follow. No further falls or hypotension    Patient Active Problem List   Diagnosis    Type 2 diabetes mellitus without complication, without long-term current use of insulin ([address]    Essential hypertension    Hypothyroidism    Hyperlipidemia    Neuropathy, peripheral    Monoclonal gammopathies    Artificial knee joint present    Primary osteoarthritis involving multiple joints    Diastolic CHF ([address]    VBI (vertebrobasilar insufficiency)    Major depressive disorder, recurrent episode, in partial remission (HCC)    Simple chronic bronchitis (HCC)    Bilateral hearing loss    CKD (chronic kidney disease), stage III (HCC)    Hyperuricemia    Primary osteoarthritis of first carpometacarpal joint of left hand    History of gout    Dependent edema           Review of Systems   Constitutional: Negative for activity change, appetite change, fatigue and fever. HENT: Negative for congestion, dental problem, ear pain, rhinorrhea, sinus pressure and sore throat. Eyes: Negative for visual disturbance. Respiratory: Negative for cough, chest tightness, shortness of breath and wheezing. Cardiovascular: Negative for chest pain, palpitations and leg swelling.    Gastrointestinal: Negative

## 2019-07-17 DIAGNOSIS — E03.9 ACQUIRED HYPOTHYROIDISM: Chronic | ICD-10-CM

## 2019-07-17 DIAGNOSIS — F33.41 MAJOR DEPRESSIVE DISORDER, RECURRENT EPISODE, IN PARTIAL REMISSION (HCC): Chronic | ICD-10-CM

## 2019-07-17 RX ORDER — VENLAFAXINE HYDROCHLORIDE 75 MG/1
75 CAPSULE, EXTENDED RELEASE ORAL DAILY
Qty: 30 CAPSULE | Refills: 11 | Status: SHIPPED | OUTPATIENT
Start: 2019-07-17 | End: 2020-07-23 | Stop reason: SDUPTHER

## 2019-07-17 RX ORDER — LEVOTHYROXINE SODIUM 0.1 MG/1
TABLET ORAL
Qty: 30 TABLET | Refills: 11 | Status: SHIPPED | OUTPATIENT
Start: 2019-07-17 | End: 2020-06-25 | Stop reason: SDUPTHER

## 2019-08-06 ENCOUNTER — OFFICE VISIT (OUTPATIENT)
Dept: FAMILY MEDICINE CLINIC | Age: 84
End: 2019-08-06
Payer: MEDICARE

## 2019-08-06 VITALS
HEIGHT: 58 IN | WEIGHT: 131.6 LBS | DIASTOLIC BLOOD PRESSURE: 64 MMHG | HEART RATE: 57 BPM | SYSTOLIC BLOOD PRESSURE: 116 MMHG | RESPIRATION RATE: 16 BRPM | BODY MASS INDEX: 27.62 KG/M2 | OXYGEN SATURATION: 97 %

## 2019-08-06 DIAGNOSIS — Z00.00 ROUTINE GENERAL MEDICAL EXAMINATION AT A HEALTH CARE FACILITY: Primary | ICD-10-CM

## 2019-08-06 PROCEDURE — G0439 PPPS, SUBSEQ VISIT: HCPCS | Performed by: FAMILY MEDICINE

## 2019-08-06 RX ORDER — OLOPATADINE HYDROCHLORIDE 2 MG/ML
SOLUTION/ DROPS OPHTHALMIC
Refills: 5 | COMMUNITY
Start: 2019-07-24

## 2019-08-06 ASSESSMENT — LIFESTYLE VARIABLES: HOW OFTEN DO YOU HAVE A DRINK CONTAINING ALCOHOL: 0

## 2019-08-06 ASSESSMENT — PATIENT HEALTH QUESTIONNAIRE - PHQ9
2. FEELING DOWN, DEPRESSED OR HOPELESS: 0
1. LITTLE INTEREST OR PLEASURE IN DOING THINGS: 0
SUM OF ALL RESPONSES TO PHQ QUESTIONS 1-9: 0
SUM OF ALL RESPONSES TO PHQ9 QUESTIONS 1 & 2: 0
SUM OF ALL RESPONSES TO PHQ QUESTIONS 1-9: 0

## 2019-08-06 NOTE — PROGRESS NOTES
Medicare Annual Wellness Visit  Name: Jamar Damon Date: 2019   MRN: [id number] Sex: Female   Age: 80 y.o. Ethnicity: Non-/Non    : 8/3/1929 Race: Billie Tierney is here for Annual Exam (80 yr old female here for her Medicare Annual Well Visit)    Screenings for behavioral, psychosocial and functional/safety risks, and cognitive dysfunction are all negative except as indicated below. These results, as well as other patient data from the [address] form, are documented in Flowsheets linked to this Encounter. Allergies   Allergen Reactions    Codeine Other (See Comments)     Gets \"jittery feeling\"     Prior to Visit Medications    Medication Sig Taking? Authorizing Provider   olopatadine (PATADAY) 0.2 % SOLN ophthalmic solution  Yes Historical Provider, MD   SITagliptin (JANUVIA) 50 MG tablet Take 1 tablet by mouth daily Yes Fabio Gutierrez MD   venlafaxine (EFFEXOR XR) 75 MG extended release capsule TAKE 1 CAPSULE BY MOUTH DAILY Yes Fabio Gutierrez MD   levothyroxine (SYNTHROID) 100 MCG tablet TAKE 1 TABLET BY MOUTH IN THE MORNING ON AN EMPTY STOMACH.  Yes Fabio Gutierrez MD   allopurinol (ZYLOPRIM) 100 MG tablet TAKE 1 TABLET BY MOUTH DAILY Yes Fabio Gutierrez MD   lovastatin (MEVACOR) 40 MG tablet TAKE ONE TABLET DAILY Yes Fabio Gutierrez MD   traZODone (DESYREL) 50 MG tablet Take 1 tablet by mouth nightly Yes Fabio Gutierrez MD   omeprazole (PRILOSEC) 20 MG delayed release capsule Take 1 capsule by mouth Daily Yes Fabio Gutierrez MD   spironolactone (ALDACTONE) 25 MG tablet Take 1 tablet by mouth daily as needed (swelling) Yes Fabio Gutierrez MD   docusate sodium (DOK) 100 MG capsule TAKE ONE CAPSULE TWO TIMES A DAY AS NEEDED FOR CONSTIPATION Yes Fabio Gutierrez MD   montelukast (SINGULAIR) 10 MG tablet TAKE 1 TABLET BY MOUTH NIGHTLY Yes Fabio Gutierrez MD   albuterol sulfate HFA (VENTOLIN HFA) 108 (90 Base) MCG/ACT inhaler Inhale

## 2019-08-12 ENCOUNTER — TELEPHONE (OUTPATIENT)
Dept: FAMILY MEDICINE CLINIC | Age: 84
End: 2019-08-12

## 2019-08-12 DIAGNOSIS — K59.04 CHRONIC IDIOPATHIC CONSTIPATION: ICD-10-CM

## 2019-08-12 DIAGNOSIS — I50.32 CHRONIC DIASTOLIC CONGESTIVE HEART FAILURE (HCC): Chronic | ICD-10-CM

## 2019-08-12 DIAGNOSIS — R60.9 DEPENDENT EDEMA: ICD-10-CM

## 2019-08-12 RX ORDER — FUROSEMIDE 40 MG/1
40 TABLET ORAL DAILY
Qty: 30 TABLET | Refills: 3 | Status: SHIPPED | OUTPATIENT
Start: 2019-08-12 | End: 2019-12-09 | Stop reason: SDUPTHER

## 2019-08-12 RX ORDER — DOCUSATE SODIUM 100 MG/1
CAPSULE, LIQUID FILLED ORAL
Qty: 60 CAPSULE | Refills: 4 | Status: SHIPPED | OUTPATIENT
Start: 2019-08-12 | End: 2020-01-16

## 2019-08-28 DIAGNOSIS — R42 VERTIGO: ICD-10-CM

## 2019-08-28 RX ORDER — MECLIZINE HYDROCHLORIDE 25 MG/1
TABLET ORAL
Qty: 90 TABLET | Refills: 1 | Status: SHIPPED | OUTPATIENT
Start: 2019-08-28 | End: 2020-01-15 | Stop reason: SDUPTHER

## 2019-09-11 ENCOUNTER — OFFICE VISIT (OUTPATIENT)
Dept: FAMILY MEDICINE CLINIC | Age: 84
End: 2019-09-11
Payer: MEDICARE

## 2019-09-11 VITALS
OXYGEN SATURATION: 99 % | DIASTOLIC BLOOD PRESSURE: 66 MMHG | HEIGHT: 58 IN | SYSTOLIC BLOOD PRESSURE: 110 MMHG | RESPIRATION RATE: 18 BRPM | WEIGHT: 132.38 LBS | HEART RATE: 62 BPM | BODY MASS INDEX: 27.79 KG/M2

## 2019-09-11 DIAGNOSIS — E11.9 TYPE 2 DIABETES MELLITUS WITHOUT COMPLICATION, WITHOUT LONG-TERM CURRENT USE OF INSULIN (HCC): ICD-10-CM

## 2019-09-11 DIAGNOSIS — F33.41 MAJOR DEPRESSIVE DISORDER, RECURRENT EPISODE, IN PARTIAL REMISSION (HCC): Chronic | ICD-10-CM

## 2019-09-11 DIAGNOSIS — J41.0 SIMPLE CHRONIC BRONCHITIS (HCC): ICD-10-CM

## 2019-09-11 DIAGNOSIS — R26.81 UNSTABLE GAIT: Primary | ICD-10-CM

## 2019-09-11 DIAGNOSIS — Z96.659 ARTIFICIAL KNEE JOINT PRESENT, UNSPECIFIED LATERALITY: ICD-10-CM

## 2019-09-11 DIAGNOSIS — G58.8 OTHER MONONEUROPATHY: Chronic | ICD-10-CM

## 2019-09-11 DIAGNOSIS — I10 ESSENTIAL HYPERTENSION: Chronic | ICD-10-CM

## 2019-09-11 DIAGNOSIS — R29.898 WEAKNESS OF BOTH LOWER EXTREMITIES: ICD-10-CM

## 2019-09-11 DIAGNOSIS — Z91.81 AT HIGH RISK FOR FALLS: ICD-10-CM

## 2019-09-11 DIAGNOSIS — N18.30 CKD (CHRONIC KIDNEY DISEASE), STAGE III (HCC): ICD-10-CM

## 2019-09-11 PROCEDURE — 99214 OFFICE O/P EST MOD 30 MIN: CPT | Performed by: FAMILY MEDICINE

## 2019-09-11 ASSESSMENT — ENCOUNTER SYMPTOMS
ALLERGIC/IMMUNOLOGIC NEGATIVE: 1
SHORTNESS OF BREATH: 1
CHEST TIGHTNESS: 0
ABDOMINAL DISTENTION: 0
BLOOD IN STOOL: 0
SINUS PRESSURE: 0
ABDOMINAL PAIN: 0
NAUSEA: 0
WHEEZING: 0
RHINORRHEA: 0
VOMITING: 0
SORE THROAT: 0
COUGH: 0
DIARRHEA: 0
BACK PAIN: 0
CONSTIPATION: 0

## 2019-09-11 NOTE — PROGRESS NOTES
systems reviewed and are negative. Prior to Visit Medications    Medication Sig Taking? Authorizing Provider   meclizine (ANTIVERT) 25 MG tablet TAKE 1 TABLET BY MOUTH 3 TIMES DAILY AS NEEDED (DIZZINESS) Yes Justin Zavala MD   furosemide (LASIX) 40 MG tablet TAKE 1 TABLET BY MOUTH DAILY Yes Justin Zavala MD   docusate sodium (DOK) 100 MG capsule TAKE ONE CAPSULE TWO TIMES A DAY AS NEEDED FOR CONSTIPATION Yes Justin Zavala MD   olopatadine (PATADAY) 0.2 % SOLN ophthalmic solution  Yes Historical Provider, MD   SITagliptin (JANUVIA) 50 MG tablet Take 1 tablet by mouth daily Yes Justin Zavala MD   venlafaxine (EFFEXOR XR) 75 MG extended release capsule TAKE 1 CAPSULE BY MOUTH DAILY Yes Justin Zavala MD   levothyroxine (SYNTHROID) 100 MCG tablet TAKE 1 TABLET BY MOUTH IN THE MORNING ON AN EMPTY STOMACH.  Yes Justin Zavala MD   allopurinol (ZYLOPRIM) 100 MG tablet TAKE 1 TABLET BY MOUTH DAILY Yes Justin Zavala MD   lovastatin (MEVACOR) 40 MG tablet TAKE ONE TABLET DAILY Yes Justin Zavala MD   traZODone (DESYREL) 50 MG tablet Take 1 tablet by mouth nightly Yes Justin Zavala MD   omeprazole (PRILOSEC) 20 MG delayed release capsule Take 1 capsule by mouth Daily Yes Justin Zavala MD   spironolactone (ALDACTONE) 25 MG tablet Take 1 tablet by mouth daily as needed (swelling) Yes Justin Zavala MD   montelukast (SINGULAIR) 10 MG tablet TAKE 1 TABLET BY MOUTH NIGHTLY Yes Justin Zavala MD   albuterol sulfate HFA (VENTOLIN HFA) 108 (90 Base) MCG/ACT inhaler Inhale 2 puffs into the lungs every 6 hours as needed for Wheezing Yes Justin Zavala MD   UNABLE TO FIND Provide one electronic blood pressure cuff for home monitoring Yes Justin Zavala MD   Blood Glucose Monitoring Suppl ([employer] BLOOD GLUCOSE MONITOR) STEWART Use as directed for daily blood sugar testing Yes Justin Zavala MD   blood glucose test strips [facility] BLOOD GLUCOSE TEST) strip 1 each by [employer] route daily use manual wheelchair due to poor strength and easy fatigue  Patient will be able to safely operate a mobility device and is motivated to do so. No follow-ups on file. An electronic signature was used to authenticate this note. --Liana Mccormack MD on 9/11/2019 at 4:04 PM On the basis of positive falls risk screening, assessment and plan is as follows: in-office gait and balance testing performed using The 30 Second Chair Stand Test was positive for increased falls risk.

## 2019-10-01 ENCOUNTER — CARE COORDINATION (OUTPATIENT)
Dept: CARE COORDINATION | Age: 84
End: 2019-10-01

## 2019-10-17 ENCOUNTER — OFFICE VISIT (OUTPATIENT)
Dept: FAMILY MEDICINE CLINIC | Age: 84
End: 2019-10-17
Payer: MEDICARE

## 2019-10-17 VITALS
WEIGHT: 136.8 LBS | DIASTOLIC BLOOD PRESSURE: 72 MMHG | BODY MASS INDEX: 28.59 KG/M2 | RESPIRATION RATE: 16 BRPM | HEART RATE: 62 BPM | SYSTOLIC BLOOD PRESSURE: 134 MMHG

## 2019-10-17 DIAGNOSIS — I10 ESSENTIAL HYPERTENSION: Chronic | ICD-10-CM

## 2019-10-17 DIAGNOSIS — E11.9 TYPE 2 DIABETES MELLITUS WITHOUT COMPLICATION, WITHOUT LONG-TERM CURRENT USE OF INSULIN (HCC): ICD-10-CM

## 2019-10-17 DIAGNOSIS — J41.0 SIMPLE CHRONIC BRONCHITIS (HCC): Primary | ICD-10-CM

## 2019-10-17 DIAGNOSIS — N18.30 CKD (CHRONIC KIDNEY DISEASE), STAGE III (HCC): ICD-10-CM

## 2019-10-17 DIAGNOSIS — M15.9 PRIMARY OSTEOARTHRITIS INVOLVING MULTIPLE JOINTS: Chronic | ICD-10-CM

## 2019-10-17 DIAGNOSIS — Z23 FLU VACCINE NEED: ICD-10-CM

## 2019-10-17 LAB
A/G RATIO: 3.2 (ref 1.1–2.2)
ALBUMIN SERPL-MCNC: 5.4 G/DL (ref 3.4–5)
ALP BLD-CCNC: 85 U/L (ref 40–129)
ALT SERPL-CCNC: 16 U/L (ref 10–40)
ANION GAP SERPL CALCULATED.3IONS-SCNC: 15 MMOL/L (ref 3–16)
AST SERPL-CCNC: 23 U/L (ref 15–37)
BILIRUB SERPL-MCNC: 0.4 MG/DL (ref 0–1)
BUN BLDV-MCNC: 19 MG/DL (ref 7–20)
CALCIUM SERPL-MCNC: 9.7 MG/DL (ref 8.3–10.6)
CHLORIDE BLD-SCNC: 99 MMOL/L (ref 99–110)
CO2: 29 MMOL/L (ref 21–32)
CREAT SERPL-MCNC: 0.8 MG/DL (ref 0.6–1.2)
GFR AFRICAN AMERICAN: >60
GFR NON-AFRICAN AMERICAN: >60
GLOBULIN: 1.7 G/DL
GLUCOSE BLD-MCNC: 147 MG/DL (ref 70–99)
POTASSIUM SERPL-SCNC: 3.7 MMOL/L (ref 3.5–5.1)
SODIUM BLD-SCNC: 143 MMOL/L (ref 136–145)
TOTAL PROTEIN: 7.1 G/DL (ref 6.4–8.2)

## 2019-10-17 PROCEDURE — 90686 IIV4 VACC NO PRSV 0.5 ML IM: CPT | Performed by: FAMILY MEDICINE

## 2019-10-17 PROCEDURE — 99214 OFFICE O/P EST MOD 30 MIN: CPT | Performed by: FAMILY MEDICINE

## 2019-10-17 PROCEDURE — 36415 COLL VENOUS BLD VENIPUNCTURE: CPT | Performed by: FAMILY MEDICINE

## 2019-10-17 PROCEDURE — G0008 ADMIN INFLUENZA VIRUS VAC: HCPCS | Performed by: FAMILY MEDICINE

## 2019-10-17 ASSESSMENT — ENCOUNTER SYMPTOMS
CHEST TIGHTNESS: 0
SHORTNESS OF BREATH: 1
CONSTIPATION: 0
COUGH: 0
ALLERGIC/IMMUNOLOGIC NEGATIVE: 1
ABDOMINAL PAIN: 0
RHINORRHEA: 0
SORE THROAT: 0
DIARRHEA: 0
BLOOD IN STOOL: 0
ABDOMINAL DISTENTION: 0
BACK PAIN: 0
VOMITING: 0
WHEEZING: 0
NAUSEA: 0
SINUS PRESSURE: 0

## 2019-10-23 ENCOUNTER — CARE COORDINATION (OUTPATIENT)
Dept: CARE COORDINATION | Age: 84
End: 2019-10-23

## 2019-10-29 ENCOUNTER — CARE COORDINATION (OUTPATIENT)
Dept: CARE COORDINATION | Age: 84
End: 2019-10-29

## 2019-11-01 ENCOUNTER — TELEPHONE (OUTPATIENT)
Dept: FAMILY MEDICINE CLINIC | Age: 84
End: 2019-11-01

## 2019-11-05 ENCOUNTER — CARE COORDINATION (OUTPATIENT)
Dept: CARE COORDINATION | Age: 84
End: 2019-11-05

## 2019-11-14 ENCOUNTER — CARE COORDINATION (OUTPATIENT)
Dept: CARE COORDINATION | Age: 84
End: 2019-11-14

## 2019-12-03 ENCOUNTER — CARE COORDINATION (OUTPATIENT)
Dept: CARE COORDINATION | Age: 84
End: 2019-12-03

## 2019-12-09 ENCOUNTER — TELEPHONE (OUTPATIENT)
Dept: FAMILY MEDICINE CLINIC | Age: 84
End: 2019-12-09

## 2019-12-09 ENCOUNTER — CARE COORDINATION (OUTPATIENT)
Dept: CARE COORDINATION | Age: 84
End: 2019-12-09

## 2019-12-09 DIAGNOSIS — I50.32 CHRONIC DIASTOLIC CONGESTIVE HEART FAILURE (HCC): Chronic | ICD-10-CM

## 2019-12-09 DIAGNOSIS — R60.9 DEPENDENT EDEMA: ICD-10-CM

## 2019-12-09 RX ORDER — FUROSEMIDE 40 MG/1
40 TABLET ORAL DAILY
Qty: 30 TABLET | Refills: 3 | Status: SHIPPED | OUTPATIENT
Start: 2019-12-09 | End: 2020-04-17 | Stop reason: SDUPTHER

## 2019-12-09 NOTE — TELEPHONE ENCOUNTER
Verbal permission given for [facility] nurse to visit one time weekly to set up medication box for client.

## 2019-12-17 DIAGNOSIS — Z87.39 HISTORY OF GOUT: ICD-10-CM

## 2019-12-17 DIAGNOSIS — E79.0 HYPERURICEMIA: ICD-10-CM

## 2019-12-17 RX ORDER — ALLOPURINOL 100 MG/1
100 TABLET ORAL DAILY
Qty: 30 TABLET | Refills: 5 | Status: SHIPPED | OUTPATIENT
Start: 2019-12-17 | End: 2020-07-10 | Stop reason: SDUPTHER

## 2019-12-31 DIAGNOSIS — R14.0 ABDOMINAL DISTENSION: ICD-10-CM

## 2019-12-31 RX ORDER — FAMOTIDINE 40 MG/1
40 TABLET, FILM COATED ORAL NIGHTLY
Qty: 90 TABLET | Refills: 3 | Status: SHIPPED | OUTPATIENT
Start: 2019-12-31 | End: 2021-01-07

## 2020-01-15 ENCOUNTER — APPOINTMENT (OUTPATIENT)
Dept: GENERAL RADIOLOGY | Age: 85
End: 2020-01-15
Payer: MEDICARE

## 2020-01-15 ENCOUNTER — OFFICE VISIT (OUTPATIENT)
Dept: FAMILY MEDICINE CLINIC | Age: 85
End: 2020-01-15
Payer: MEDICARE

## 2020-01-15 ENCOUNTER — HOSPITAL ENCOUNTER (EMERGENCY)
Age: 85
Discharge: HOME | End: 2020-01-15
Attending: EMERGENCY MEDICINE
Payer: MEDICARE

## 2020-01-15 VITALS
HEART RATE: 102 BPM | BODY MASS INDEX: 28.3 KG/M2 | DIASTOLIC BLOOD PRESSURE: 66 MMHG | SYSTOLIC BLOOD PRESSURE: 122 MMHG | RESPIRATION RATE: 16 BRPM | WEIGHT: 135.4 LBS | OXYGEN SATURATION: 99 %

## 2020-01-15 VITALS
TEMPERATURE: 98.4 F | SYSTOLIC BLOOD PRESSURE: 148 MMHG | OXYGEN SATURATION: 97 % | HEIGHT: 62 IN | WEIGHT: 132 LBS | BODY MASS INDEX: 24.29 KG/M2 | RESPIRATION RATE: 18 BRPM | DIASTOLIC BLOOD PRESSURE: 80 MMHG | HEART RATE: 82 BPM

## 2020-01-15 DIAGNOSIS — E03.9 ACQUIRED HYPOTHYROIDISM: Chronic | ICD-10-CM

## 2020-01-15 DIAGNOSIS — E78.2 MIXED HYPERLIPIDEMIA: Chronic | ICD-10-CM

## 2020-01-15 DIAGNOSIS — E11.9 TYPE 2 DIABETES MELLITUS WITHOUT COMPLICATION, WITHOUT LONG-TERM CURRENT USE OF INSULIN (HCC): ICD-10-CM

## 2020-01-15 DIAGNOSIS — R29.898 WEAKNESS OF BOTH LOWER EXTREMITIES: ICD-10-CM

## 2020-01-15 DIAGNOSIS — E79.0 HYPERURICEMIA: ICD-10-CM

## 2020-01-15 DIAGNOSIS — R60.9 DEPENDENT EDEMA: ICD-10-CM

## 2020-01-15 DIAGNOSIS — R53.83 FATIGUE, UNSPECIFIED TYPE: ICD-10-CM

## 2020-01-15 DIAGNOSIS — I10 ESSENTIAL HYPERTENSION: Chronic | ICD-10-CM

## 2020-01-15 DIAGNOSIS — Z87.39 HISTORY OF GOUT: ICD-10-CM

## 2020-01-15 DIAGNOSIS — N18.30 CKD (CHRONIC KIDNEY DISEASE), STAGE III (HCC): ICD-10-CM

## 2020-01-15 DIAGNOSIS — R42 VERTIGO: ICD-10-CM

## 2020-01-15 LAB
ALBUMIN SERPL-MCNC: 4.7 GM/DL (ref 3.4–5)
ALP BLD-CCNC: 74 IU/L (ref 40–129)
ALT SERPL-CCNC: 18 U/L (ref 10–40)
ANION GAP SERPL CALCULATED.3IONS-SCNC: 11 MMOL/L (ref 4–16)
AST SERPL-CCNC: 36 IU/L (ref 15–37)
BACTERIA: ABNORMAL /HPF
BASOPHILS ABSOLUTE: 0 K/CU MM
BASOPHILS RELATIVE PERCENT: 0.5 % (ref 0–1)
BILIRUB SERPL-MCNC: 0.3 MG/DL (ref 0–1)
BILIRUBIN URINE: NEGATIVE MG/DL
BLOOD, URINE: NEGATIVE
BUN BLDV-MCNC: 23 MG/DL (ref 6–23)
CALCIUM SERPL-MCNC: 10.2 MG/DL (ref 8.3–10.6)
CAST TYPE: ABNORMAL /HPF
CHLORIDE BLD-SCNC: 97 MMOL/L (ref 99–110)
CLARITY: CLEAR
CO2: 31 MMOL/L (ref 21–32)
COLOR: YELLOW
CREAT SERPL-MCNC: 1 MG/DL (ref 0.6–1.1)
CRYSTAL TYPE: ABNORMAL /HPF
DIFFERENTIAL TYPE: ABNORMAL
EOSINOPHILS ABSOLUTE: 0.2 K/CU MM
EOSINOPHILS RELATIVE PERCENT: 3.8 % (ref 0–3)
EPITHELIAL CELLS, UA: ABNORMAL /HPF
GFR AFRICAN AMERICAN: >60 ML/MIN/1.73M2
GFR NON-AFRICAN AMERICAN: 52 ML/MIN/1.73M2
GLUCOSE BLD-MCNC: 111 MG/DL (ref 70–99)
GLUCOSE, URINE: NEGATIVE MG/DL
HBA1C MFR BLD: 5.8 %
HCT VFR BLD CALC: 39.2 % (ref 37–47)
HEMOGLOBIN: 12.5 GM/DL (ref 12.5–16)
IMMATURE NEUTROPHIL %: 0.3 % (ref 0–0.43)
KETONES, URINE: NEGATIVE MG/DL
LEUKOCYTE ESTERASE, URINE: ABNORMAL
LIPASE: 19 IU/L (ref 13–60)
LYMPHOCYTES ABSOLUTE: 1.1 K/CU MM
LYMPHOCYTES RELATIVE PERCENT: 19.7 % (ref 24–44)
MCH RBC QN AUTO: 29.6 PG (ref 27–31)
MCHC RBC AUTO-ENTMCNC: 31.9 % (ref 32–36)
MCV RBC AUTO: 92.9 FL (ref 78–100)
MONOCYTES ABSOLUTE: 0.5 K/CU MM
MONOCYTES RELATIVE PERCENT: 9.2 % (ref 0–4)
MUCUS: NEGATIVE HPF
NITRITE URINE, QUANTITATIVE: NEGATIVE
PDW BLD-RTO: 13.1 % (ref 11.7–14.9)
PH, URINE: 6.5 (ref 5–8)
PLATELET # BLD: 129 K/CU MM (ref 140–440)
PMV BLD AUTO: 11.1 FL (ref 7.5–11.1)
POTASSIUM SERPL-SCNC: 5.1 MMOL/L (ref 3.5–5.1)
PRO-BNP: 369.2 PG/ML
PROTEIN UA: NEGATIVE MG/DL
RBC # BLD: 4.22 M/CU MM (ref 4.2–5.4)
RBC URINE: ABNORMAL /HPF (ref 0–6)
SEGMENTED NEUTROPHILS ABSOLUTE COUNT: 3.9 K/CU MM
SEGMENTED NEUTROPHILS RELATIVE PERCENT: 66.5 % (ref 36–66)
SODIUM BLD-SCNC: 139 MMOL/L (ref 135–145)
SPECIFIC GRAVITY UA: <1.005 (ref 1–1.03)
TOTAL IMMATURE NEUTOROPHIL: 0.02 K/CU MM
TOTAL PROTEIN: 7.1 GM/DL (ref 6.4–8.2)
TROPONIN T: <0.01 NG/ML
TSH HIGH SENSITIVITY: 0.87 UIU/ML (ref 0.27–4.2)
UROBILINOGEN, URINE: 0.2 MG/DL (ref 0.2–1)
VOLUME, (UVOL): 12 ML (ref 10–12)
WBC # BLD: 5.8 K/CU MM (ref 4–10.5)
WBC UA: ABNORMAL /HPF (ref 0–5)

## 2020-01-15 PROCEDURE — 99284 EMERGENCY DEPT VISIT MOD MDM: CPT

## 2020-01-15 PROCEDURE — 71046 X-RAY EXAM CHEST 2 VIEWS: CPT

## 2020-01-15 PROCEDURE — 84443 ASSAY THYROID STIM HORMONE: CPT

## 2020-01-15 PROCEDURE — 83690 ASSAY OF LIPASE: CPT

## 2020-01-15 PROCEDURE — 85025 COMPLETE CBC W/AUTO DIFF WBC: CPT

## 2020-01-15 PROCEDURE — 83880 ASSAY OF NATRIURETIC PEPTIDE: CPT

## 2020-01-15 PROCEDURE — 84484 ASSAY OF TROPONIN QUANT: CPT

## 2020-01-15 PROCEDURE — 83036 HEMOGLOBIN GLYCOSYLATED A1C: CPT | Performed by: FAMILY MEDICINE

## 2020-01-15 PROCEDURE — 93005 ELECTROCARDIOGRAM TRACING: CPT | Performed by: EMERGENCY MEDICINE

## 2020-01-15 PROCEDURE — 81001 URINALYSIS AUTO W/SCOPE: CPT

## 2020-01-15 PROCEDURE — 99214 OFFICE O/P EST MOD 30 MIN: CPT | Performed by: FAMILY MEDICINE

## 2020-01-15 PROCEDURE — 80053 COMPREHEN METABOLIC PANEL: CPT

## 2020-01-15 RX ORDER — MONTELUKAST SODIUM 10 MG/1
10 TABLET ORAL NIGHTLY
Qty: 90 TABLET | Refills: 3 | Status: SHIPPED | OUTPATIENT
Start: 2020-01-15 | End: 2021-02-23 | Stop reason: SDUPTHER

## 2020-01-15 RX ORDER — MECLIZINE HYDROCHLORIDE 25 MG/1
25 TABLET ORAL 3 TIMES DAILY PRN
Qty: 90 TABLET | Refills: 1 | Status: SHIPPED | OUTPATIENT
Start: 2020-01-15 | End: 2020-04-17 | Stop reason: SDUPTHER

## 2020-01-15 RX ORDER — ALBUTEROL SULFATE 90 UG/1
2 AEROSOL, METERED RESPIRATORY (INHALATION) EVERY 6 HOURS PRN
Qty: 3 INHALER | Refills: 3 | Status: SHIPPED | OUTPATIENT
Start: 2020-01-15

## 2020-01-15 RX ORDER — SPIRONOLACTONE 25 MG/1
25 TABLET ORAL DAILY PRN
Qty: 30 TABLET | Refills: 5 | Status: SHIPPED | OUTPATIENT
Start: 2020-01-15 | End: 2020-08-07 | Stop reason: SDUPTHER

## 2020-01-15 ASSESSMENT — PAIN DESCRIPTION - DESCRIPTORS: DESCRIPTORS: DISCOMFORT;TIGHTNESS

## 2020-01-15 ASSESSMENT — PAIN SCALES - GENERAL: PAINLEVEL_OUTOF10: 4

## 2020-01-15 ASSESSMENT — PAIN DESCRIPTION - LOCATION: LOCATION: ABDOMEN

## 2020-01-15 NOTE — PROGRESS NOTES
1/15/2020     Sarah Marcelino (:  8/3/1929) is a 80 y.o. female, here for evaluation of the following medical concerns:    Patient presents with:  Diabetes: Pt is here for 3 month f/u. Hypertension  Hyperlipidemia  Chronic Kidney Disease  Other: Pt c/o lightheaddedness and weakness BLE, started last week. Meclizine helps temporarily. Legs ( l worse than R) feels like it is tingling for years but worse the ast few months. Legs feel weak but hasn't fallen, walks with wheeled walker    DM, no symptomatic low sugars    htn  /66   Pulse 102   Resp 16   Wt 135 lb 6.4 oz (61.4 kg)   SpO2 99%   BMI 28.30 kg/m²     Hx gout and CKD recheck    Vertigo worse for last few weeks responds to antivert. Review of Systems   Constitutional: Negative for activity change, appetite change, fatigue and fever. HENT: Negative for congestion, dental problem, ear pain, rhinorrhea, sinus pressure and sore throat. Eyes: Negative for visual disturbance. Respiratory: Negative for cough, chest tightness, shortness of breath and wheezing. Easy dyspnea but better than in past   Cardiovascular: Negative for chest pain, palpitations and leg swelling. Gastrointestinal: Negative for abdominal distention, abdominal pain, blood in stool, constipation, diarrhea, nausea and vomiting. Endocrine: Negative. Genitourinary: Negative for dysuria, flank pain, frequency, hematuria and urgency. Musculoskeletal: Positive for arthralgias, gait problem, joint swelling and neck pain. Negative for back pain, myalgias and neck stiffness. Pain neck hands knees hips and feet,      Skin: Negative for rash. Allergic/Immunologic: Negative. Neurological: Positive for numbness. Negative for dizziness, tremors, weakness and headaches. Bilat feet , mild since   intermittent hands and lips   Psychiatric/Behavioral: Negative.   Negative for agitation, behavioral problems, confusion, decreased plantar locations tested, avoiding callused areas - > 1 area with absence of sensation is + for neuropathy)    Plus at least one of the following:  Pulses: normal,   Pinprick: Intact  Proprioception: Intact  Vibration (128 Hz): Impaired    ASSESSMENT/PLAN:  1. Type 2 diabetes mellitus without complication, without long-term current use of insulin (HCC)    - POCT glycosylated hemoglobin (Hb A1C)  - POCT microalbumin  - HM DIABETES FOOT EXAM  - Comprehensive Metabolic Panel; Future  - Lipid Panel; Future    2. Vertigo    - meclizine (ANTIVERT) 25 MG tablet; Take 1 tablet by mouth 3 times daily as needed for Dizziness  Dispense: 90 tablet; Refill: 1  - CBC Auto Differential; Future  - Comprehensive Metabolic Panel; Future  - C-Reactive Protein; Future  - Sedimentation Rate; Future    3. Simple chronic bronchitis (HCC)    - albuterol sulfate HFA (VENTOLIN HFA) 108 (90 Base) MCG/ACT inhaler; Inhale 2 puffs into the lungs every 6 hours as needed for Wheezing  Dispense: 3 Inhaler; Refill: 3    4. Cough    - montelukast (SINGULAIR) 10 MG tablet; Take 1 tablet by mouth nightly  Dispense: 90 tablet; Refill: 3    5. Chronic diastolic congestive heart failure (HCC)    - spironolactone (ALDACTONE) 25 MG tablet; Take 1 tablet by mouth daily as needed (swelling)  Dispense: 30 tablet; Refill: 5    6. Essential hypertension    - spironolactone (ALDACTONE) 25 MG tablet; Take 1 tablet by mouth daily as needed (swelling)  Dispense: 30 tablet; Refill: 5  - Comprehensive Metabolic Panel; Future  - Lipid Panel; Future    7. Dependent edema    - spironolactone (ALDACTONE) 25 MG tablet; Take 1 tablet by mouth daily as needed (swelling)  Dispense: 30 tablet; Refill: 5  - Comprehensive Metabolic Panel; Future    8. Weakness of both lower extremities    - C-Reactive Protein; Future  - Sedimentation Rate; Future    9. CKD (chronic kidney disease), stage III (HCC)    - Comprehensive Metabolic Panel; Future    10.  Acquired hypothyroidism    - TSH without Reflex; Future  - T4, Free; Future    11. Hyperuricemia    - Comprehensive Metabolic Panel; Future  - Uric Acid; Future    12. Mixed hyperlipidemia    - Comprehensive Metabolic Panel; Future  - Lipid Panel; Future    13. History of gout    - Uric Acid; Future    14. Fatigue, unspecified type    - CBC Auto Differential; Future  - TSH without Reflex; Future  - T4, Free; Future  - C-Reactive Protein; Future  - Sedimentation Rate; Future      Return in about 3 months (around 4/15/2020), or if symptoms worsen or fail to improve. An electronic signature was used to authenticate this note.     --Pearl Rivas MD on 1/18/2020 at 1:53 PM

## 2020-01-16 LAB
A/G RATIO: 1.8 (ref 1.1–2.2)
ALBUMIN SERPL-MCNC: 4.4 G/DL (ref 3.4–5)
ALP BLD-CCNC: 64 U/L (ref 40–129)
ALT SERPL-CCNC: 18 U/L (ref 10–40)
ANION GAP SERPL CALCULATED.3IONS-SCNC: 17 MMOL/L (ref 3–16)
AST SERPL-CCNC: 28 U/L (ref 15–37)
BASOPHILS ABSOLUTE: 0 K/UL (ref 0–0.2)
BASOPHILS RELATIVE PERCENT: 0.8 %
BILIRUB SERPL-MCNC: 0.4 MG/DL (ref 0–1)
BUN BLDV-MCNC: 21 MG/DL (ref 7–20)
C-REACTIVE PROTEIN: 0.5 MG/L (ref 0–5.1)
CALCIUM SERPL-MCNC: 9.7 MG/DL (ref 8.3–10.6)
CHLORIDE BLD-SCNC: 100 MMOL/L (ref 99–110)
CHOLESTEROL, TOTAL: 147 MG/DL (ref 0–199)
CO2: 23 MMOL/L (ref 21–32)
CREAT SERPL-MCNC: 0.8 MG/DL (ref 0.6–1.2)
EOSINOPHILS ABSOLUTE: 0.2 K/UL (ref 0–0.6)
EOSINOPHILS RELATIVE PERCENT: 4.6 %
GFR AFRICAN AMERICAN: >60
GFR NON-AFRICAN AMERICAN: >60
GLOBULIN: 2.5 G/DL
GLUCOSE BLD-MCNC: 123 MG/DL (ref 70–99)
HCT VFR BLD CALC: 37.4 % (ref 36–48)
HDLC SERPL-MCNC: 55 MG/DL (ref 40–60)
HEMOGLOBIN: 12.5 G/DL (ref 12–16)
LDL CHOLESTEROL CALCULATED: 70 MG/DL
LYMPHOCYTES ABSOLUTE: 0.7 K/UL (ref 1–5.1)
LYMPHOCYTES RELATIVE PERCENT: 14.2 %
MCH RBC QN AUTO: 30.5 PG (ref 26–34)
MCHC RBC AUTO-ENTMCNC: 33.5 G/DL (ref 31–36)
MCV RBC AUTO: 91.1 FL (ref 80–100)
MONOCYTES ABSOLUTE: 0.4 K/UL (ref 0–1.3)
MONOCYTES RELATIVE PERCENT: 8.2 %
NEUTROPHILS ABSOLUTE: 3.6 K/UL (ref 1.7–7.7)
NEUTROPHILS RELATIVE PERCENT: 72.2 %
PDW BLD-RTO: 14 % (ref 12.4–15.4)
PLATELET # BLD: 125 K/UL (ref 135–450)
PMV BLD AUTO: 10.4 FL (ref 5–10.5)
POTASSIUM SERPL-SCNC: 4.2 MMOL/L (ref 3.5–5.1)
RBC # BLD: 4.1 M/UL (ref 4–5.2)
SEDIMENTATION RATE, ERYTHROCYTE: 10 MM/HR (ref 0–30)
SODIUM BLD-SCNC: 140 MMOL/L (ref 136–145)
T4 FREE: 1.5 NG/DL (ref 0.9–1.8)
TOTAL PROTEIN: 6.9 G/DL (ref 6.4–8.2)
TRIGL SERPL-MCNC: 110 MG/DL (ref 0–150)
TSH SERPL DL<=0.05 MIU/L-ACNC: 0.58 UIU/ML (ref 0.27–4.2)
URIC ACID, SERUM: 4.8 MG/DL (ref 2.6–6)
VLDLC SERPL CALC-MCNC: 22 MG/DL
WBC # BLD: 5 K/UL (ref 4–11)

## 2020-01-16 PROCEDURE — 93010 ELECTROCARDIOGRAM REPORT: CPT | Performed by: INTERNAL MEDICINE

## 2020-01-16 RX ORDER — DOCUSATE SODIUM 100 MG/1
CAPSULE, LIQUID FILLED ORAL
Qty: 60 CAPSULE | Refills: 4 | Status: SHIPPED | OUTPATIENT
Start: 2020-01-16 | End: 2020-06-18 | Stop reason: SDUPTHER

## 2020-01-16 NOTE — ED PROVIDER NOTES
ALLERGIES     Codeine    FAMILY HISTORY       Family History   Problem Relation Age of Onset    Heart Disease Father     Cancer Brother     Heart Attack Brother           SOCIAL HISTORY       Social History     Socioeconomic History    Marital status:      Spouse name: None    Number of children: None    Years of education: None    Highest education level: None   Occupational History    None   Social Needs    Financial resource strain: None    Food insecurity:     Worry: None     Inability: None    Transportation needs:     Medical: None     Non-medical: None   Tobacco Use    Smoking status: Never Smoker    Smokeless tobacco: Never Used   Substance and Sexual Activity    Alcohol use: No    Drug use: No    Sexual activity: Yes     Partners: Male     Comment: states sometimes   Lifestyle    Physical activity:     Days per week: None     Minutes per session: None    Stress: None   Relationships    Social connections:     Talks on phone: None     Gets together: None     Attends Jewish service: None     Active member of club or organization: None     Attends meetings of clubs or organizations: None     Relationship status: None    Intimate partner violence:     Fear of current or ex partner: None     Emotionally abused: None     Physically abused: None     Forced sexual activity: None   Other Topics Concern    None   Social History Narrative    None       SCREENINGS   [email or web address] Coma Scale  Eye Opening: Spontaneous  Best Verbal Response: Oriented  Best Motor Response: Obeys commands  [place] Coma Scale Score: 15        PHYSICAL EXAM       ED Triage Vitals [01/15/20 1933]   BP Temp Temp Source Pulse Resp SpO2 Height Weight   (!) 177/92 98.4 °F (36.9 °C) Oral 77 18 98 % 5' 2\" (1.575 m) 132 lb (59.9 kg)       Physical Exam  General appearance: Alert, cooperative, no distress, appears stated age.   Head:  Normocephalic, without obvious abnormality, atraumatic. HEENT: Mucous membranes moist.  Neck: Full ROM, trachea midline, no JVD  Lungs: No respiratory distress  Cardiovasular: Perfusing extremities  Abdomen: Nontender, no guarding  Extremities: Atraumatic, full ROM  Skin: No rashes or lesions to exposed skin  Neurologic: Alert and oriented x3, motor grossly normal, clear speech    DIAGNOSTIC RESULTS     EKG: NSR, incomplete RBBB, no STEMI    RADIOLOGY:   Non-plain film images such as CT, Ultrasound and MRI are read by the radiologist.Plain radiographic images are visualized and preliminarily interpreted by the emergency physician with the below findings:    No acute cardiopulmonary disease    Interpretation per the Radiologist below, if available at the time of this note:    XR CHEST STANDARD (2 VW)   Final Result   No acute cardiopulmonary disease. COPD. EDBEDSIDE ULTRASOUND:   Performed by Esther Zuniga - none    LABS:  Labs Reviewed   CBC WITH AUTO DIFFERENTIAL - Abnormal; Notable for the following components:       Result Value    MCHC 31.9 (*)     Platelets 616 (*)     Segs Relative 66.5 (*)     Lymphocytes % 19.7 (*)     Monocytes % 9.2 (*)     Eosinophils % 3.8 (*)     All other components within normal limits   COMPREHENSIVE METABOLIC PANEL - Abnormal; Notable for the following components:    Chloride 97 (*)     Glucose 111 (*)     GFR Non- 52 (*)     All other components within normal limits   URINALYSIS WITH MICROSCOPIC - Abnormal; Notable for the following components:    Leukocyte Esterase, Urine SMALL (*)     Bacteria, UA FEW (*)     All other components within normal limits   BRAIN NATRIURETIC PEPTIDE - Abnormal; Notable for the following components:    Pro-.2 (*)     All other components within normal limits   TROPONIN   TSH WITHOUT REFLEX   LIPASE       All other labs were within normal range or not returned as of this dictation.     EMERGENCY DEPARTMENT COURSE and DIFFERENTIAL DIAGNOSIS/MDM:   Vitals: Vitals:    01/15/20 1933 01/15/20 2140   BP: (!) 177/92    Pulse: 77    Resp: 18    Temp: 98.4 °F (36.9 °C)    TempSrc: Oral    SpO2: 98% 97%   Weight: 132 lb (59.9 kg)    Height: 5' 2\" (1.575 m)        MDM  Patient presents with weakness and fatigue. Vital signs are stable. No abnormalities on physical exam.  Will get chest x-ray, labs, EKG, urinalysis. All studies are normal.  Unclear cause for the patient's weakness. She continues to have vital signs the emergency department, no arrhythmias on telemetry. We will discharge the patient to home with PCP follow-up. REASSESSMENT          CRITICAL CARE TIME   Total Critical Care time was 0 minutes, excluding separately reportable procedures. There was a high probability of clinically significant/life threatening deteriorationin the patient's condition which required my urgent intervention. CONSULTS:  None     PROCEDURES:  Unless otherwise noted below, none     Procedures    FINAL IMPRESSION      1.  General weakness          DISPOSITION/PLAN   DISPOSITION Decision To Discharge 01/15/2020 09:49:53 PM      PATIENT REFERRED TO:  Bina Perez MD  [facility] [address]  [phone]    Schedule an appointment as soon as possible for a visit       [facility] Emergency Department  [address]  [phone]    If symptoms worsen      DISCHARGE MEDICATIONS:  New Prescriptions    No medications on file          (Please note that portions of this note were completed with a voicerecognition program.  Efforts were made to edit the dictations but occasionally words are mis-transcribed.)    Raza Hernandez MD (electronically signed)  Attending Emergency Physician           Raza Hernandez MD  01/15/20 2150

## 2020-01-18 ASSESSMENT — ENCOUNTER SYMPTOMS
SORE THROAT: 0
NAUSEA: 0
CONSTIPATION: 0
BACK PAIN: 0
RHINORRHEA: 0
SHORTNESS OF BREATH: 0
ALLERGIC/IMMUNOLOGIC NEGATIVE: 1
BLOOD IN STOOL: 0
DIARRHEA: 0
COUGH: 0
WHEEZING: 0
SINUS PRESSURE: 0
ABDOMINAL PAIN: 0
VOMITING: 0
ABDOMINAL DISTENTION: 0
CHEST TIGHTNESS: 0

## 2020-01-22 LAB
EKG ATRIAL RATE: 67 BPM
EKG DIAGNOSIS: NORMAL
EKG P AXIS: 15 DEGREES
EKG P-R INTERVAL: 168 MS
EKG Q-T INTERVAL: 406 MS
EKG QRS DURATION: 102 MS
EKG QTC CALCULATION (BAZETT): 429 MS
EKG R AXIS: -46 DEGREES
EKG T AXIS: 29 DEGREES
EKG VENTRICULAR RATE: 67 BPM

## 2020-02-05 ENCOUNTER — TELEPHONE (OUTPATIENT)
Dept: FAMILY MEDICINE CLINIC | Age: 85
End: 2020-02-05

## 2020-03-16 ENCOUNTER — APPOINTMENT (OUTPATIENT)
Dept: GENERAL RADIOLOGY | Age: 85
End: 2020-03-16
Payer: MEDICARE

## 2020-03-16 ENCOUNTER — HOSPITAL ENCOUNTER (EMERGENCY)
Age: 85
Discharge: HOME | End: 2020-03-16
Attending: EMERGENCY MEDICINE
Payer: MEDICARE

## 2020-03-16 VITALS
TEMPERATURE: 98.2 F | BODY MASS INDEX: 26.31 KG/M2 | SYSTOLIC BLOOD PRESSURE: 120 MMHG | HEART RATE: 70 BPM | OXYGEN SATURATION: 95 % | RESPIRATION RATE: 22 BRPM | WEIGHT: 134 LBS | DIASTOLIC BLOOD PRESSURE: 76 MMHG | HEIGHT: 60 IN

## 2020-03-16 PROCEDURE — 71045 X-RAY EXAM CHEST 1 VIEW: CPT

## 2020-03-16 PROCEDURE — 6370000000 HC RX 637 (ALT 250 FOR IP): Performed by: EMERGENCY MEDICINE

## 2020-03-16 PROCEDURE — 93010 ELECTROCARDIOGRAM REPORT: CPT | Performed by: INTERNAL MEDICINE

## 2020-03-16 PROCEDURE — 93005 ELECTROCARDIOGRAM TRACING: CPT | Performed by: EMERGENCY MEDICINE

## 2020-03-16 PROCEDURE — 99285 EMERGENCY DEPT VISIT HI MDM: CPT

## 2020-03-16 RX ORDER — IBUPROFEN 400 MG/1
400 TABLET ORAL ONCE
Status: COMPLETED | OUTPATIENT
Start: 2020-03-16 | End: 2020-03-16

## 2020-03-16 RX ADMIN — IBUPROFEN 400 MG: 400 TABLET, FILM COATED ORAL at 17:25

## 2020-03-16 ASSESSMENT — PAIN DESCRIPTION - ORIENTATION: ORIENTATION: LEFT;RIGHT;MID

## 2020-03-16 ASSESSMENT — PAIN SCALES - GENERAL
PAINLEVEL_OUTOF10: 9
PAINLEVEL_OUTOF10: 9

## 2020-03-16 ASSESSMENT — ENCOUNTER SYMPTOMS
CHEST TIGHTNESS: 0
COUGH: 1
WHEEZING: 0
STRIDOR: 0

## 2020-03-16 ASSESSMENT — PAIN DESCRIPTION - DESCRIPTORS: DESCRIPTORS: ACHING

## 2020-03-16 ASSESSMENT — PAIN DESCRIPTION - PAIN TYPE: TYPE: ACUTE PAIN

## 2020-03-16 ASSESSMENT — PAIN DESCRIPTION - LOCATION: LOCATION: NECK

## 2020-03-16 NOTE — ED PROVIDER NOTES
Triage Chief Complaint:   Neck Pain (Pt arrives per ems from home stating since yesterday. Pt states she is dizzy, pt took Tylenol and Meclizine but pt states her pain to neck is getting worse ) and Cough (Pt states she has cough productive of white sputum)    Yuhaaviatam:  Amina Og is a 80 y.o. female that presents to the ED by EMS. The complaint is pain in her neck. Patient eyes any pressure tightness in her chest no history of acute coronary syndrome or CAD. She missed been ill for about a week with some intermittent dizziness which she takes meds at home. She denies any current nausea vomiting back or flank discomfort denies any fevers or chills she has had occasional cough but no sputum production no wheezing. She does not have documented history of chronic lung disease. No recent travel no ill contacts. Exposed to anybody with known coronavirus    Past Medical History:   Diagnosis Date    Allergic rhinitis     Anxiety     Arthritis     Bradycardia     Cellulitis, leg 1/8/2012    Depression     Diabetes mellitus (HCC)     sugars controlled off meds    Diabetic eye exam ([facility] [address] 1/28/16    no retinopathy    Gout     Hyperlipidemia     Hypertension     Hypothyroidism      Past Surgical History:   Procedure Laterality Date    APPENDECTOMY  13 yrs ago    CARPAL TUNNEL RELEASE      bilateral    COLONOSCOPY      ENDOSCOPY, COLON, DIAGNOSTIC  7/8/13    gastritis, hiatal hernia    EYE SURGERY      both eyes    HERNIA REPAIR      umbilical    HIP SURGERY Left 08/22/2016    ORIF    HYSTERECTOMY      complete    JOINT REPLACEMENT      left knee    THYROIDECTOMY, PARTIAL      ULNAR TUNNEL RELEASE      bilateral     Family History   Problem Relation Age of Onset    Heart Disease Father     Cancer Brother     Heart Attack Brother      Social History     Socioeconomic History    Marital status:       Spouse name: Not on file    Number of children: Not on file    Years of education: acute cardiopulmonary disease. EKG (if obtained): (All EKG's are interpreted by myself in the absence of a cardiologist)      The 12 lead EKG was interpreted by me, and the interpretation is as follows:  normal sinus rhythm, rate = 71. Intervals are within the normal range. LAHB  QTc is not prolonged. ST elevations are not present. T wave inversions are not present. Non-specific T wave changes are not present. Delta waves, Brugada Syndrome, and Short [place] are not present. There is no acute ischemia. Chart review shows recent radiographs:  No results found. MDM:      Patient presents ED with a complaint of neck pain. After evaluation clinically she appeared well stable not infectious initially chest x-ray is obtained reveals no infiltrate or cardiomegaly EKG within normal limits. After discussion with the daughter she tells me that her 51-year-old mother has been helping her boyfriend move and most likely this is consistent with some myofascial discomfort. Treatment will be supportive    Please note that portions of this note may have been completed with a voice recognition/dictation program. Efforts were made to edit the dictations but occasionally words are mis-transcribed.      All pertinent Lab data and radiographic results reviewed with patient at bedside. The patient and/or the family were informed of the results of any tests/labs/imaging, the treatment plan, and time was allotted to answer questions. See chart for details of medications given during the ED stay.     The likelihood of other entities in the differential is insufficient to justify any further testing for them. This was explained to the patient. The patient was advised that persistent or worsening symptoms would require further evaluation.                Clinical Impression:  1.  Neck pain      Disposition referral (if applicable):  Celeste Moraes MD  [facility] [phone]    Go in 1 week  If

## 2020-03-16 NOTE — ED NOTES
Discharge instructions reviewed and pt acknowledges understanding. To exit per w/c for discharge with daughter.      Nirav Barron RN  03/16/20 2156

## 2020-03-18 LAB
EKG ATRIAL RATE: 71 BPM
EKG DIAGNOSIS: NORMAL
EKG P AXIS: 10 DEGREES
EKG P-R INTERVAL: 162 MS
EKG Q-T INTERVAL: 402 MS
EKG QRS DURATION: 102 MS
EKG QTC CALCULATION (BAZETT): 436 MS
EKG R AXIS: -45 DEGREES
EKG T AXIS: 47 DEGREES
EKG VENTRICULAR RATE: 71 BPM

## 2020-04-08 ENCOUNTER — TELEPHONE (OUTPATIENT)
Dept: FAMILY MEDICINE CLINIC | Age: 85
End: 2020-04-08

## 2020-04-08 NOTE — TELEPHONE ENCOUNTER
Trent Garcia from [facility] called and needs a verbal re certification for the patients home health care. Please advise.   Trent Garcia : [phone]

## 2020-04-09 NOTE — TELEPHONE ENCOUNTER
Called [facility] and gave verbal consent to continue patients home health care.

## 2020-04-17 RX ORDER — OMEPRAZOLE 20 MG/1
20 CAPSULE, DELAYED RELEASE ORAL DAILY
Qty: 30 CAPSULE | Refills: 2 | Status: SHIPPED | OUTPATIENT
Start: 2020-04-17 | End: 2020-07-16

## 2020-04-17 RX ORDER — FUROSEMIDE 40 MG/1
40 TABLET ORAL DAILY
Qty: 30 TABLET | Refills: 2 | Status: SHIPPED | OUTPATIENT
Start: 2020-04-17 | End: 2020-07-23 | Stop reason: SDUPTHER

## 2020-04-17 RX ORDER — MECLIZINE HYDROCHLORIDE 25 MG/1
25 TABLET ORAL 3 TIMES DAILY PRN
Qty: 90 TABLET | Refills: 1 | Status: SHIPPED | OUTPATIENT
Start: 2020-04-17 | End: 2020-08-07 | Stop reason: SDUPTHER

## 2020-05-06 ENCOUNTER — TELEPHONE (OUTPATIENT)
Dept: FAMILY MEDICINE CLINIC | Age: 85
End: 2020-05-06

## 2020-05-19 ENCOUNTER — OFFICE VISIT (OUTPATIENT)
Dept: FAMILY MEDICINE CLINIC | Age: 85
End: 2020-05-19
Payer: MEDICARE

## 2020-05-19 VITALS
RESPIRATION RATE: 16 BRPM | TEMPERATURE: 96.9 F | DIASTOLIC BLOOD PRESSURE: 62 MMHG | OXYGEN SATURATION: 97 % | BODY MASS INDEX: 27.81 KG/M2 | WEIGHT: 142.4 LBS | SYSTOLIC BLOOD PRESSURE: 114 MMHG | HEART RATE: 54 BPM

## 2020-05-19 PROCEDURE — 99203 OFFICE O/P NEW LOW 30 MIN: CPT | Performed by: PHYSICIAN ASSISTANT

## 2020-05-19 NOTE — PROGRESS NOTES
Paula Kirkland  8/3/1929  80 y.o.  female    SUBJECTIVE:    Chief Complaint   Patient presents with    3 Month Follow-Up     Pt here for 3 month follow up    Dizziness     Pt c/o some lightheadedness. HPI   Pt here to establish care as previous PCP in this office has left. Pt is here with daughter today. Pt here to review chronic conditions as follows:    DM-med compliant, well controlled, A1C earlier in year 5.8. Denies polyuria/polydipsia/polyphagia at this time. HTN-The patient is taking hypertensive medications compliantly without side effects. Denies chest pain, dyspnea, edema, or TIA's. VBI-chronic, has recurrent mild vertigo, better with meclizine, no change in severity/occurance/presentation. Pt does c/o mild lateral right neck pain, chronic, comes and goes throughout day, especially with head rotation.     Current Outpatient Medications on File Prior to Visit   Medication Sig Dispense Refill    furosemide (LASIX) 40 MG tablet Take 1 tablet by mouth daily 30 tablet 2    meclizine (ANTIVERT) 25 MG tablet Take 1 tablet by mouth 3 times daily as needed for Dizziness 90 tablet 1    omeprazole (PRILOSEC) 20 MG delayed release capsule Take 1 capsule by mouth Daily 30 capsule 2    docusate sodium (STOOL SOFTENER LAXATIVE) 100 MG capsule TAKE ONE CAPSULE TWO TIMES A DAY AS NEEDED FOR CONSTIPATION 60 capsule 4    albuterol sulfate HFA (VENTOLIN HFA) 108 (90 Base) MCG/ACT inhaler Inhale 2 puffs into the lungs every 6 hours as needed for Wheezing 3 Inhaler 3    montelukast (SINGULAIR) 10 MG tablet Take 1 tablet by mouth nightly 90 tablet 3    spironolactone (ALDACTONE) 25 MG tablet Take 1 tablet by mouth daily as needed (swelling) 30 tablet 5    famotidine (PEPCID) 40 MG tablet TAKE 1 TABLET BY MOUTH NIGHTLY 90 tablet 3    allopurinol (ZYLOPRIM) 100 MG tablet TAKE 1 TABLET BY MOUTH DAILY 30 tablet 5    SITagliptin (JANUVIA) 50 MG tablet Take 1 tablet by mouth daily 90 tablet 3    olopatadine (PATADAY) 0.2 % SOLN ophthalmic solution   5    venlafaxine (EFFEXOR XR) 75 MG extended release capsule TAKE 1 CAPSULE BY MOUTH DAILY 30 capsule 11    levothyroxine (SYNTHROID) 100 MCG tablet TAKE 1 TABLET BY MOUTH IN THE MORNING ON AN EMPTY STOMACH. 30 tablet 11    lovastatin (MEVACOR) 40 MG tablet TAKE ONE TABLET DAILY 90 tablet 3    traZODone (DESYREL) 50 MG tablet Take 1 tablet by mouth nightly 30 tablet 11    UNABLE TO FIND Provide one electronic blood pressure cuff for home monitoring 1 each 0    Blood Glucose Monitoring Suppl ([employer] BLOOD GLUCOSE MONITOR) STEWART Use as directed for daily blood sugar testing 2 Device 3    blood glucose test strips (EMBRACE BLOOD GLUCOSE TEST) strip 1 each by In Vitro route daily As needed. 100 each 3    acetaminophen (TYLENOL) 650 MG CR tablet Take 1 tablet by mouth every 8 hours as needed for Pain 90 tablet 3    Misc. Devices (TUB TRANSFER BOARD) MISC Use as directed 1 each 0    aspirin EC 81 MG EC tablet Take 1 tablet by mouth daily. 30 tablet 5     No current facility-administered medications on file prior to visit. Review of PMH, PSH, Family Hx, Allergies and updates made as needed.             Allergies   Allergen Reactions    Codeine Other (See Comments)     Gets \"jittery feeling\"       Past Medical History:   Diagnosis Date    Allergic rhinitis     Anxiety     Arthritis     Bradycardia     Cellulitis, leg 1/8/2012    Depression     Diabetes mellitus ([address]     sugars controlled off meds    Diabetic eye exam ([facility] [address] 1/28/16    no retinopathy    Gout     Hyperlipidemia     Hypertension     Hypothyroidism        Past Surgical History:   Procedure Laterality Date    APPENDECTOMY  13 yrs ago    CARPAL TUNNEL RELEASE      bilateral    COLONOSCOPY      ENDOSCOPY, COLON, DIAGNOSTIC  7/8/13    gastritis, hiatal hernia    EYE SURGERY      both eyes    HERNIA REPAIR      umbilical    HIP SURGERY Left 08/22/2016    ORIF    HYSTERECTOMY concentration and dysphoric mood. The patient is not nervous/anxious. OBJECTIVE:    /62 (Site: Right Upper Arm, Position: Sitting, Cuff Size: Medium Adult)   Pulse 54   Temp 96.9 °F (36.1 °C) (Temporal)   Resp 16   Wt 142 lb 6.4 oz (64.6 kg)   SpO2 97%   BMI 27.81 kg/m²     Physical Exam  Vitals signs reviewed. Constitutional:       General: She is not in acute distress. Appearance: Normal appearance. She is normal weight. HENT:      Head: Normocephalic. Neck:      Musculoskeletal: Normal range of motion and neck supple. Cardiovascular:      Rate and Rhythm: Normal rate and regular rhythm. Pulmonary:      Effort: Pulmonary effort is normal.      Breath sounds: Normal breath sounds. Musculoskeletal:      Cervical back: She exhibits tenderness. Back:    Skin:     General: Skin is warm and dry. Neurological:      Mental Status: She is alert and oriented to person, place, and time. Psychiatric:         Mood and Affect: Mood normal.         Thought Content:  Thought content normal.         Judgment: Judgment normal.         ASSESSMENT/PLAN:    Problem List        Circulatory    VBI (vertebrobasilar insufficiency)     Stable, chronic, continue meclizine prn, reviewed home safety tips with pt and daughter today         Essential hypertension (Chronic)     No change in meds at this time         Relevant Medications    aspirin EC 81 MG EC tablet    UNABLE TO FIND    spironolactone (ALDACTONE) 25 MG tablet       Endocrine    Type 2 diabetes mellitus without complication, without long-term current use of insulin ([address] - Primary     Well controlled, no change in tx at this time         Relevant Medications    Blood Glucose Monitoring Suppl ([employer] BLOOD GLUCOSE MONITOR) STEWART    blood glucose test strips (ShareS[employer] BLOOD GLUCOSE TEST) strip    SITagliptin (JANUVIA) 50 MG tablet       Other    Neck pain     Will get xrays but ok to use topical meds/mild heat and/or ice, tylenol prn Relevant Orders    XR CERVICAL SPINE (2-3 VIEWS)               No follow-ups on file.

## 2020-05-21 ASSESSMENT — ENCOUNTER SYMPTOMS
ABDOMINAL PAIN: 0
SHORTNESS OF BREATH: 0
COUGH: 0

## 2020-05-27 ENCOUNTER — HOSPITAL ENCOUNTER (OUTPATIENT)
Dept: GENERAL RADIOLOGY | Age: 85
Discharge: HOME | End: 2020-05-27
Payer: MEDICARE

## 2020-05-27 ENCOUNTER — HOSPITAL ENCOUNTER (OUTPATIENT)
Age: 85
Discharge: HOME | End: 2020-05-27
Payer: MEDICARE

## 2020-05-27 PROCEDURE — 72040 X-RAY EXAM NECK SPINE 2-3 VW: CPT

## 2020-06-05 ENCOUNTER — TELEPHONE (OUTPATIENT)
Dept: FAMILY MEDICINE CLINIC | Age: 85
End: 2020-06-05

## 2020-06-05 NOTE — TELEPHONE ENCOUNTER
[facility] from [facility] called for verbal permission to continue to set up patients medication

## 2020-06-08 NOTE — TELEPHONE ENCOUNTER
Called and spoke to Khanh Jensen from [facility] gave verbal permission to continue to set up patients medication for patient

## 2020-06-18 RX ORDER — DOCUSATE SODIUM 100 MG/1
CAPSULE, LIQUID FILLED ORAL
Qty: 60 CAPSULE | Refills: 4 | Status: SHIPPED | OUTPATIENT
Start: 2020-06-18 | End: 2020-11-12

## 2020-06-25 RX ORDER — LEVOTHYROXINE SODIUM 0.1 MG/1
TABLET ORAL
Qty: 30 TABLET | Refills: 11 | Status: SHIPPED | OUTPATIENT
Start: 2020-06-25 | End: 2020-07-23 | Stop reason: SDUPTHER

## 2020-06-25 RX ORDER — LOVASTATIN 40 MG/1
TABLET ORAL
Qty: 90 TABLET | Refills: 3 | Status: SHIPPED | OUTPATIENT
Start: 2020-06-25 | End: 2021-05-21

## 2020-07-10 RX ORDER — ALLOPURINOL 100 MG/1
100 TABLET ORAL DAILY
Qty: 30 TABLET | Refills: 5 | Status: SHIPPED | OUTPATIENT
Start: 2020-07-10 | End: 2021-01-07

## 2020-07-16 RX ORDER — OMEPRAZOLE 20 MG/1
20 CAPSULE, DELAYED RELEASE ORAL DAILY
Qty: 30 CAPSULE | Refills: 2 | Status: SHIPPED | OUTPATIENT
Start: 2020-07-16 | End: 2020-10-15

## 2020-07-22 RX ORDER — VENLAFAXINE HYDROCHLORIDE 75 MG/1
75 CAPSULE, EXTENDED RELEASE ORAL DAILY
Qty: 30 CAPSULE | Refills: 11 | Status: CANCELLED | OUTPATIENT
Start: 2020-07-22

## 2020-07-23 RX ORDER — VENLAFAXINE HYDROCHLORIDE 75 MG/1
75 CAPSULE, EXTENDED RELEASE ORAL DAILY
Qty: 30 CAPSULE | Refills: 11 | Status: SHIPPED | OUTPATIENT
Start: 2020-07-23 | End: 2021-06-29

## 2020-07-23 RX ORDER — FUROSEMIDE 40 MG/1
40 TABLET ORAL DAILY
Qty: 30 TABLET | Refills: 11 | Status: SHIPPED | OUTPATIENT
Start: 2020-07-23 | End: 2021-06-29

## 2020-07-23 RX ORDER — LEVOTHYROXINE SODIUM 0.1 MG/1
TABLET ORAL
Qty: 30 TABLET | Refills: 11 | Status: SHIPPED | OUTPATIENT
Start: 2020-07-23 | End: 2021-08-04

## 2020-07-30 RX ORDER — TRAZODONE HYDROCHLORIDE 50 MG/1
50 TABLET ORAL NIGHTLY
Qty: 30 TABLET | Refills: 0 | Status: SHIPPED | OUTPATIENT
Start: 2020-07-30 | End: 2020-08-07 | Stop reason: SDUPTHER

## 2020-08-04 ENCOUNTER — TELEPHONE (OUTPATIENT)
Dept: FAMILY MEDICINE CLINIC | Age: 85
End: 2020-08-04

## 2020-08-04 NOTE — TELEPHONE ENCOUNTER
Chikis Hayden from [facility] called to request verbal ok to continue to see Pt weekly for med minder. Verbal order given. No further action needed.

## 2020-08-07 ENCOUNTER — OFFICE VISIT (OUTPATIENT)
Dept: FAMILY MEDICINE CLINIC | Age: 85
End: 2020-08-07
Payer: MEDICARE

## 2020-08-07 VITALS
RESPIRATION RATE: 18 BRPM | OXYGEN SATURATION: 97 % | DIASTOLIC BLOOD PRESSURE: 70 MMHG | WEIGHT: 145.25 LBS | HEART RATE: 67 BPM | BODY MASS INDEX: 29.28 KG/M2 | SYSTOLIC BLOOD PRESSURE: 120 MMHG | TEMPERATURE: 97.5 F | HEIGHT: 59 IN

## 2020-08-07 LAB — HBA1C MFR BLD: 6 %

## 2020-08-07 PROCEDURE — G0439 PPPS, SUBSEQ VISIT: HCPCS | Performed by: PHYSICIAN ASSISTANT

## 2020-08-07 PROCEDURE — 83036 HEMOGLOBIN GLYCOSYLATED A1C: CPT | Performed by: PHYSICIAN ASSISTANT

## 2020-08-07 RX ORDER — SPIRONOLACTONE 25 MG/1
25 TABLET ORAL DAILY PRN
Qty: 90 TABLET | Refills: 1 | Status: SHIPPED | OUTPATIENT
Start: 2020-08-07 | End: 2021-02-23

## 2020-08-07 RX ORDER — TRAZODONE HYDROCHLORIDE 50 MG/1
50 TABLET ORAL NIGHTLY
Qty: 90 TABLET | Refills: 1 | Status: SHIPPED | OUTPATIENT
Start: 2020-08-07 | End: 2021-02-23 | Stop reason: SDUPTHER

## 2020-08-07 RX ORDER — MECLIZINE HYDROCHLORIDE 25 MG/1
25 TABLET ORAL 3 TIMES DAILY PRN
Qty: 90 TABLET | Refills: 1 | Status: SHIPPED | OUTPATIENT
Start: 2020-08-07 | End: 2020-09-25 | Stop reason: SDUPTHER

## 2020-08-07 ASSESSMENT — LIFESTYLE VARIABLES: HOW OFTEN DO YOU HAVE A DRINK CONTAINING ALCOHOL: 0

## 2020-08-07 ASSESSMENT — PATIENT HEALTH QUESTIONNAIRE - PHQ9
SUM OF ALL RESPONSES TO PHQ QUESTIONS 1-9: 1
SUM OF ALL RESPONSES TO PHQ QUESTIONS 1-9: 1

## 2020-08-07 NOTE — PATIENT INSTRUCTIONS
Learning About Medical Power of   What is a medical power of ? A medical power of , also called a durable power of  for health care, is one type of the legal forms called advance directives. It lets you name the person you want to make treatment decisions for you if you can't speak or decide for yourself. The person you choose is called your health care agent. This person is also called a health care proxy or health care surrogate. A medical power of  may be called something else in your state. How do you choose a health care agent? Choose your health care agent carefully. This person may or may not be a family member. Talk to the person before you make your final decision. Make sure he or she is comfortable with this responsibility. It's a good idea to choose someone who:  · Is at least 25years old. · Knows you well and understands what makes life meaningful for you. · Understands your Orthodoxy and moral values. · Will do what you want, not what he or she wants. · Will be able to make difficult choices at a stressful time. · Will be able to refuse or stop treatment, if that is what you would want, even if you could die. · Will be firm and confident with health professionals if needed. · Will ask questions to get needed information. · Lives near you or agrees to travel to you if needed. Your family may help you make medical decisions while you can still be part of that process. But it's important to choose one person to be your health care agent in case you aren't able to make decisions for yourself. If you don't fill out the legal form and name a health care agent, the decisions your family can make may be limited. A health care agent may be called something else in your state. Who will make decisions for you if you don't have a health care agent? If you don't have a health care agent or a living will, you may not get the care you want. Decisions may be made by family members who disagree about your medical care. Or decisions may be made by a medical professional who doesn't know you well. In some cases, a  makes the decisions. When you name a health care agent, it is very clear who has the power to make health decisions for you. How do you name a health care agent? You name your health care agent on a legal form. This form is usually called a medical power of . Ask your hospital, state bar association, or office on aging where to find these forms. You must sign the form to make it legal. Some states require you to get the form notarized. This means that a person called a  watches you sign the form and then he or she signs the form. Some states also require that two or more witnesses sign the form. Be sure to tell your family members and doctors who your health care agent is. Where can you learn more? Go to https://Enders Fundpepiceweb.Ask Ziggy. org and sign in to your [employer] account. Enter [id number] in the 4tiitoo box to learn more about \"Learning About [address]. \"     If you do not have an account, please click on the \"Sign Up Now\" link. Current as of: December 9, 2019               Content Version: 12.5  © 9605-9507 Healthwise, Incorporated. Care instructions adapted under license by [facility] ([facility]). If you have questions about a medical condition or this instruction, always ask your healthcare professional. [place] [address] any warranty or liability for your use of this information. Learning About Living [place]  What is a living will? A living will, also called a declaration, is a legal form. It tells your family and your doctor your wishes when you can't speak for yourself. It's used by the health professionals who will treat you as you near the end of your life or if you get seriously hurt or ill.   If you put your wishes in writing, your loved ones and others will know what kind of care you want. They won't need to guess. This can ease your mind and be helpful to others. And you can change or cancel your living will at any time. A living will is not the same as an estate or property will. An estate will explains what you want to happen with your money and property after you die. How do you use it? A living will is used to describe the kinds of treatment or life support you want as you near the end of your life or if you get seriously hurt or ill. Keep these facts in mind about living huffmna. · Your living will is used only if you can't speak or make decisions for yourself. Most often, one or more doctors must certify that you can't speak or decide for yourself before your living will takes effect. · If you get better and can speak for yourself again, you can accept or refuse any treatment. It doesn't matter what you said in your living will. · Some states may limit your right to refuse treatment in certain cases. For example, you may need to clearly state in your living will that you don't want artificial hydration and nutrition, such as being fed through a tube. Is a living will a legal document? A living will is a legal document. Each state has its own laws about living huffman. And a living will may be called something else in your state. Here are some things to know about living huffman. · You don't need an  to complete a living will. But legal advice can be helpful if your state's laws are unclear. It can also help if your health history is complicated or your family can't agree on what should be in your living will. · You can change your living will at any time. Some people find that their wishes about end-of-life care change as their health changes. If you make big changes to your living will, complete a new form. · If you move to another state, make sure that your living will is legal in the state where you now live.  In most cases, doctors will respect your wishes even if you have a form from a different state. · You might use a universal form that has been approved by many states. This kind of form can sometimes be filled out and stored online. Your digital copy will then be available wherever you have a connection to the internet. The doctors and nurses who need to treat you can find it right away. · Your state may offer an online registry. This is another place where you can store your living will online. · It's a good idea to get your living will notarized. This means using a person called a [employer] to watch two people sign, or witness, your living will. What should you know when you create a living will? Here are some questions to ask yourself as you make your living will:  · Do you know enough about life support methods that might be used? If not, talk to your doctor so you know what might be done if you can't breathe on your own, your heart stops, or you can't swallow. · What things would you still want to be able to do after you receive life-support methods? Would you want to be able to walk? To speak? To eat on your own? To live without the help of machines? · Do you want certain Taoist practices performed if you become very ill? · If you have a choice, where do you want to be cared for? In your home? At a hospital or nursing home? · If you have a choice at the end of your life, where would you prefer to die? At home? In a hospital or nursing home? Somewhere else? · Would you prefer to be buried or cremated? · Do you want your organs to be donated after you die? What should you do with your living will? · Make sure that your family members and your health care agent have copies of your living will (also called a declaration). · Give your doctor a copy of your living will. Ask him or her to keep it as part of your medical record. If you have more than one doctor, make sure that each one has a copy.   · Put a copy of your living will where it can be easily found. For example, some people may put a copy on their refrigerator door. If you are using a digital copy, be sure your doctor, family members, and health care agent know how to find and access it. Where can you learn more? Go to https://chpepiceweb.OnePIN. org and sign in to your Zulu account. Enter Q163 in the [facility] box to learn more about \"Learning About Living [place] Shrestha. \"     If you do not have an account, please click on the \"Sign Up Now\" link. Current as of: December 9, 2019               Content Version: 12.5  © 7209-4680 Healthwise, [employer]. Care instructions adapted under license by [facility] ([facility]). If you have questions about a medical condition or this instruction, always ask your healthcare professional. [address] any warranty or liability for your use of this information. Personalized Preventive Plan for Reggy Dial - 8/7/2020  Medicare offers a range of preventive health benefits. Some of the tests and screenings are paid in full while other may be subject to a deductible, co-insurance, and/or copay. Some of these benefits include a comprehensive review of your medical history including lifestyle, illnesses that may run in your family, and various assessments and screenings as appropriate. After reviewing your medical record and screening and assessments performed today your provider may have ordered immunizations, labs, imaging, and/or referrals for you. A list of these orders (if applicable) as well as your Preventive Care list are included within your After Visit Summary for your review. Other Preventive Recommendations:    · A preventive eye exam performed by an eye specialist is recommended every 1-2 years to screen for glaucoma; cataracts, macular degeneration, and other eye disorders. · A preventive dental visit is recommended every 6 months.   · Try to get at least 150 minutes of exercise per week or 10,000 steps per day on a pedometer . · Order or download the FREE \"Exercise & Physical Activity: Your Everyday Guide\" from [employer]. Call 8-375.282.8033 or search [employer] online. · You need 1648-1852 mg of calcium and 0143-2246 IU of vitamin D per day. It is possible to meet your calcium requirement with diet alone, but a vitamin D supplement is usually necessary to meet this goal.  · When exposed to the sun, use a sunscreen that protects against both UVA and UVB radiation with an SPF of 30 or greater. Reapply every 2 to 3 hours or after sweating, drying off with a towel, or swimming. · Always wear a seat belt when traveling in a car. Always wear a helmet when riding a bicycle or motorcycle.

## 2020-08-07 NOTE — PROGRESS NOTES
Medicare Annual Wellness Visit  Name: Agnieszka Chakraborty Date: 8/10/2020   MRN: [id number] Sex: Female   Age: 80 y.o. Ethnicity: Non-/Non    : 8/3/1929 Race: Mirian Tinajero is here for Medicare AWV (Pt is here for her Medicare Wellness)    Screenings for behavioral, psychosocial and functional/safety risks, and cognitive dysfunction are all negative except as indicated below. These results, as well as other patient data from the [address] form, are documented in Flowsheets linked to this Encounter. Allergies   Allergen Reactions    Codeine Other (See Comments)     Gets \"jittery feeling\"         Prior to Visit Medications    Medication Sig Taking? Authorizing Provider   SITagliptin (JANUVIA) 50 MG tablet Take 1 tablet by mouth daily Yes Tino Morris PA-C   traZODone (DESYREL) 50 MG tablet Take 1 tablet by mouth nightly Yes Tino Morris PA-C   spironolactone (ALDACTONE) 25 MG tablet Take 1 tablet by mouth daily as needed (swelling) Yes Tino Morris PA-C   meclizine (ANTIVERT) 25 MG tablet Take 1 tablet by mouth 3 times daily as needed for Dizziness Yes Tino Morris PA-C   venlafaxine (EFFEXOR XR) 75 MG extended release capsule Take 1 capsule by mouth daily Yes Tino Morris PA-C   levothyroxine (SYNTHROID) 100 MCG tablet TAKE 1 TABLET BY MOUTH IN THE MORNING ON AN EMPTY STOMACH.  Yes Tino Morris PA-C   furosemide (LASIX) 40 MG tablet Take 1 tablet by mouth daily Yes Tino Morris PA-C   omeprazole (PRILOSEC) 20 MG delayed release capsule TAKE 1 CAPSULE BY MOUTH DAILY Yes Tino Morris PA-C   allopurinol (ZYLOPRIM) 100 MG tablet Take 1 tablet by mouth daily Yes Tino Morris PA-C   lovastatin (MEVACOR) 40 MG tablet TAKE ONE TABLET DAILY Yes Tino Morris PA-C   docusate sodium (STOOL SOFTENER LAXATIVE) 100 MG capsule TAKE ONE CAPSULE TWO TIMES A DAY AS NEEDED FOR CONSTIPATION Yes Tino Morris PA-C   albuterol sulfate HFA (VENTOLIN HFA) 108 (90 Base) MCG/ACT Attack Brother        [facility] (Including outside providers/suppliers regularly involved in providing care):   Patient Care Team:  Leslie Peguero PA-C as PCP - General (Physician Assistant)  Leslie Peguero PA-C as PCP - [facility] Provider    Wt Readings from Last 3 Encounters:   08/07/20 145 lb 4 oz (65.9 kg)   05/19/20 142 lb 6.4 oz (64.6 kg)   03/16/20 134 lb (60.8 kg)     Vitals:    08/07/20 1110   BP: 120/70   Site: Right Upper Arm   Position: Sitting   Cuff Size: Medium Adult   Pulse: 67   Resp: 18   Temp: 97.5 °F (36.4 °C)   SpO2: 97%   Weight: 145 lb 4 oz (65.9 kg)   Height: 4' 11\" (1.499 m)     Body mass index is 29.34 kg/m². Based upon direct observation of the patient, evaluation of cognition reveals recent and remote memory intact. General Appearance: alert and oriented to person, place and time, well-developed and well-nourished, in no acute distress  Skin: warm and dry, no rash or erythema  Head: normocephalic and atraumatic  Pulmonary/Chest: clear to auscultation bilaterally- no wheezes, rales or rhonchi, normal air movement, no respiratory distress  Cardiovascular: normal rate, normal S1 and S2, no gallops, intact distal pulses and no carotid bruits  Abdomen: soft, non-tender, non-distended, normal bowel sounds, no masses or organomegaly  Extremities: no cyanosis and no clubbing  Musculoskeletal: normal range of motion, no joint swelling, deformity or tenderness  Neurologic: gait and coordination normal and speech normal    Patient's complete Health Risk Assessment and screening values have been reviewed and are found in Flowsheets. The following problems were reviewed today and where indicated follow up appointments were made and/or referrals ordered.     Positive Risk Factor Screenings with Interventions:     Health Habits/Nutrition:  Health Habits/Nutrition  Do you exercise for at least 20 minutes 2-3 times per week?: (!) No  Have you lost any weight without trying in the past 3 months?: No  Do you eat fewer than 2 meals per day?: No  Have you seen a dentist within the past year?: (!) No  Body mass index is 29.34 kg/m². Health Habits/Nutrition Interventions:  · Inadequate physical activity:  patient is not ready to increase his/her physical activity level at this time  · Dental exam overdue:  patient encouraged to make appointment with his/her dentist    Hearing/Vision:  No exam data present  Hearing/Vision  Do you or your family notice any trouble with your hearing?: No  Do you have difficulty driving, watching TV, or doing any of your daily activities because of your eyesight?: No  Have you had an eye exam within the past year?: (!) No  Hearing/Vision Interventions:  · Vision concerns:  patient encouraged to make appointment with his/her eye specialist    Safety:  Safety  Do you have working smoke detectors?: (!) No  Have all throw rugs been removed or fastened?: Yes  Do you have non-slip mats or surfaces in all bathtubs/showers?: Yes  Do all of your stairways have a railing or banister?: Yes  Are your doorways, halls and stairs free of clutter?: Yes  Do you always fasten your seatbelt when you are in a car?: Yes  Safety Interventions:  · Home safety tips provided    ADL:  ADLs  In the past 7 days, did you need help from others to perform any of the following everyday activities? Eating, dressing, grooming, bathing, toileting, or walking/balance?: (!) Toileting  In the past 7 days, did you need help from others to take care of any of the following?  Laundry, housekeeping, banking/finances, shopping, telephone use, food preparation, transportation, or taking medications?: None  ADL Interventions:  · Patient declines any further evaluation/treatment for this issue    Personalized Preventive Plan   Current Health Maintenance Status  Immunization History   Administered Date(s) Administered    Influenza 09/17/2012, 10/08/2013    Influenza Vaccine, unspecified formulation 10/03/2016    Influenza Virus Vaccine 10/08/2014, 09/25/2015    Influenza, Corey Majestic, IM, (6 mo and older Fluzone, Flulaval, Fluarix and 3 yrs and older Afluria) 09/29/2017    Influenza, Quadv, IM, PF (6 mo and older Fluzone, Flulaval, Fluarix, and 3 yrs and older Afluria) 09/21/2018, 10/17/2019    Pneumococcal Conjugate 13-valent ([id number]) 09/25/2015    Pneumococcal Polysaccharide ([id number]) 06/05/2013    Td, unspecified formulation 01/07/2012        Health Maintenance   Topic Date Due    DTaP/Tdap/Td vaccine (1 - Tdap) 08/03/1948    Shingles Vaccine (1 of 2) 08/03/1979    Annual Wellness Visit (AWV)  05/29/2019    Flu vaccine (1) 09/01/2020    Lipid screen  01/15/2021    TSH testing  01/15/2021    Potassium monitoring  01/15/2021    Creatinine monitoring  01/15/2021    Pneumococcal 65+ years Vaccine  Completed    Hepatitis A vaccine  Aged Out    Hib vaccine  Aged Out    Meningococcal (ACWY) vaccine  Aged Out     Recommendations for Sensorly Due: see orders and patient instructions/AVS.  . Recommended screening schedule for the next 5-10 years is provided to the patient in written form: see Patient Instructions/AVS.    Allison Wilson was seen today for medicare awv. Diagnoses and all orders for this visit:    Routine general medical examination at a health care facility    Type 2 diabetes mellitus without complication, without long-term current use of insulin (HCC)  -     POCT glycosylated hemoglobin (Hb A1C)    ACP (advance care planning)    Primary insomnia  -     traZODone (DESYREL) 50 MG tablet; Take 1 tablet by mouth nightly    Chronic diastolic congestive heart failure (HCC)  -     spironolactone (ALDACTONE) 25 MG tablet; Take 1 tablet by mouth daily as needed (swelling)    Essential hypertension  -     spironolactone (ALDACTONE) 25 MG tablet; Take 1 tablet by mouth daily as needed (swelling)    Dependent edema  -     spironolactone (ALDACTONE) 25 MG tablet;  Take 1 tablet by mouth daily as needed (swelling)    Vertigo  -     meclizine (ANTIVERT) 25 MG tablet; Take 1 tablet by mouth 3 times daily as needed for Dizziness    Other orders  -     SITagliptin (JANUVIA) 50 MG tablet;  Take 1 tablet by mouth daily

## 2020-08-10 PROBLEM — R42 VERTIGO: Status: ACTIVE | Noted: 2020-08-10

## 2020-08-10 PROBLEM — Z71.89 ACP (ADVANCE CARE PLANNING): Status: ACTIVE | Noted: 2020-08-10

## 2020-08-10 PROBLEM — Z00.00 ROUTINE GENERAL MEDICAL EXAMINATION AT A HEALTH CARE FACILITY: Status: ACTIVE | Noted: 2020-08-10

## 2020-09-09 PROBLEM — Z00.00 ROUTINE GENERAL MEDICAL EXAMINATION AT A HEALTH CARE FACILITY: Status: RESOLVED | Noted: 2020-08-10 | Resolved: 2020-09-09

## 2020-09-22 ENCOUNTER — APPOINTMENT (OUTPATIENT)
Dept: CT IMAGING | Age: 85
End: 2020-09-22
Payer: MEDICARE

## 2020-09-22 ENCOUNTER — HOSPITAL ENCOUNTER (EMERGENCY)
Age: 85
Discharge: HOME | End: 2020-09-22
Attending: EMERGENCY MEDICINE
Payer: MEDICARE

## 2020-09-22 VITALS
WEIGHT: 138 LBS | HEART RATE: 72 BPM | HEIGHT: 60 IN | OXYGEN SATURATION: 98 % | RESPIRATION RATE: 18 BRPM | BODY MASS INDEX: 27.09 KG/M2 | TEMPERATURE: 98.6 F | SYSTOLIC BLOOD PRESSURE: 118 MMHG | DIASTOLIC BLOOD PRESSURE: 86 MMHG

## 2020-09-22 LAB
ANION GAP SERPL CALCULATED.3IONS-SCNC: 11 MMOL/L (ref 4–16)
BASOPHILS ABSOLUTE: 0.1 K/CU MM
BASOPHILS RELATIVE PERCENT: 0.4 % (ref 0–1)
BUN BLDV-MCNC: 21 MG/DL (ref 6–23)
CALCIUM SERPL-MCNC: 9.5 MG/DL (ref 8.3–10.6)
CHLORIDE BLD-SCNC: 96 MMOL/L (ref 99–110)
CO2: 31 MMOL/L (ref 21–32)
CREAT SERPL-MCNC: 1 MG/DL (ref 0.6–1.1)
DIFFERENTIAL TYPE: ABNORMAL
EOSINOPHILS ABSOLUTE: 0.1 K/CU MM
EOSINOPHILS RELATIVE PERCENT: 0.4 % (ref 0–3)
GFR AFRICAN AMERICAN: >60 ML/MIN/1.73M2
GFR AFRICAN AMERICAN: >60 ML/MIN/1.73M2
GFR NON-AFRICAN AMERICAN: 51 ML/MIN/1.73M2
GFR NON-AFRICAN AMERICAN: 52 ML/MIN/1.73M2
GLUCOSE BLD-MCNC: 165 MG/DL (ref 70–99)
HCT VFR BLD CALC: 37.3 % (ref 37–47)
HEMOGLOBIN: 12.3 GM/DL (ref 12.5–16)
IMMATURE NEUTROPHIL %: 0.3 % (ref 0–0.43)
LYMPHOCYTES ABSOLUTE: 0.9 K/CU MM
LYMPHOCYTES RELATIVE PERCENT: 7.7 % (ref 24–44)
MCH RBC QN AUTO: 30.1 PG (ref 27–31)
MCHC RBC AUTO-ENTMCNC: 33 % (ref 32–36)
MCV RBC AUTO: 91.2 FL (ref 78–100)
MONOCYTES ABSOLUTE: 0.9 K/CU MM
MONOCYTES RELATIVE PERCENT: 7.2 % (ref 0–4)
PDW BLD-RTO: 13.2 % (ref 11.7–14.9)
PLATELET # BLD: 115 K/CU MM (ref 140–440)
PMV BLD AUTO: 10.7 FL (ref 7.5–11.1)
POC CREATININE: 1 MG/DL (ref 0.6–1.1)
POTASSIUM SERPL-SCNC: 3.7 MMOL/L (ref 3.5–5.1)
RBC # BLD: 4.09 M/CU MM (ref 4.2–5.4)
SEGMENTED NEUTROPHILS ABSOLUTE COUNT: 10.2 K/CU MM
SEGMENTED NEUTROPHILS RELATIVE PERCENT: 84 % (ref 36–66)
SODIUM BLD-SCNC: 138 MMOL/L (ref 135–145)
TOTAL IMMATURE NEUTOROPHIL: 0.04 K/CU MM
WBC # BLD: 12.1 K/CU MM (ref 4–10.5)

## 2020-09-22 PROCEDURE — 99284 EMERGENCY DEPT VISIT MOD MDM: CPT

## 2020-09-22 PROCEDURE — 85025 COMPLETE CBC W/AUTO DIFF WBC: CPT

## 2020-09-22 PROCEDURE — 93005 ELECTROCARDIOGRAM TRACING: CPT | Performed by: EMERGENCY MEDICINE

## 2020-09-22 PROCEDURE — 6370000000 HC RX 637 (ALT 250 FOR IP): Performed by: EMERGENCY MEDICINE

## 2020-09-22 PROCEDURE — 80048 BASIC METABOLIC PNL TOTAL CA: CPT

## 2020-09-22 PROCEDURE — 6360000004 HC RX CONTRAST MEDICATION: Performed by: EMERGENCY MEDICINE

## 2020-09-22 PROCEDURE — 75635 CT ANGIO ABDOMINAL ARTERIES: CPT

## 2020-09-22 RX ORDER — ACETAMINOPHEN 325 MG/1
650 TABLET ORAL ONCE
Status: COMPLETED | OUTPATIENT
Start: 2020-09-22 | End: 2020-09-22

## 2020-09-22 RX ADMIN — ACETAMINOPHEN 650 MG: 325 TABLET ORAL at 18:36

## 2020-09-22 RX ADMIN — IOPAMIDOL 100 ML: 755 INJECTION, SOLUTION INTRAVENOUS at 16:49

## 2020-09-22 ASSESSMENT — PAIN SCALES - GENERAL
PAINLEVEL_OUTOF10: 4
PAINLEVEL_OUTOF10: 8

## 2020-09-22 ASSESSMENT — PAIN DESCRIPTION - DESCRIPTORS: DESCRIPTORS: SQUEEZING

## 2020-09-22 ASSESSMENT — PAIN DESCRIPTION - LOCATION: LOCATION: LEG

## 2020-09-22 ASSESSMENT — PAIN DESCRIPTION - ORIENTATION: ORIENTATION: RIGHT;LEFT

## 2020-09-22 NOTE — ED PROVIDER NOTES
Triage Chief Complaint:   Leg Pain (C/o bilateral leg pain that started yesterday but worsened through the night. Patient states \"I had to force myself to walk to the restroom and back to my room. \" EMS states patient screamed when trying to transfer from chair to stretcher. Patient denies injury. ) and Fatigue (\"I just feel like someone beat the tar out of me. I just hurt everywhere\")    [place]:  Tessa Wallace is a 80 y.o. female that presents by 911. Her aide called. The patient usually gets around with a walker. She is complained of her feet that are cold and difficulty with movement. Any movement causes her pain. Reviewed the medical records I do not see any history of previous vascular occlusion aortic aneurysm or peripheral arterial disease. Patient does have risk factors considering age diabetes hypertension hyperlipidemia. Past Medical History:   Diagnosis Date    Allergic rhinitis     Anxiety     Arthritis     Bradycardia     Cellulitis, leg 1/8/2012    Depression     Diabetes mellitus (HCC)     sugars controlled off meds    Diabetic eye exam ([facility] [address] 1/28/16    no retinopathy    Gout     Hyperlipidemia     Hypertension     Hypothyroidism      Past Surgical History:   Procedure Laterality Date    APPENDECTOMY  13 yrs ago    CARPAL TUNNEL RELEASE      bilateral    COLONOSCOPY      ENDOSCOPY, COLON, DIAGNOSTIC  7/8/13    gastritis, hiatal hernia    EYE SURGERY      both eyes    HERNIA REPAIR      umbilical    HIP SURGERY Left 08/22/2016    ORIF    HYSTERECTOMY      complete    JOINT REPLACEMENT      left knee    THYROIDECTOMY, PARTIAL      ULNAR TUNNEL RELEASE      bilateral     Family History   Problem Relation Age of Onset    Heart Disease Father     Cancer Brother     Heart Attack Brother      Social History     Socioeconomic History    Marital status:       Spouse name: Not on file    Number of children: Not on file    Years of education: Not on file    Highest education level: Not on file   Occupational History    Not on file   Social Needs    Financial resource strain: Not on file    Food insecurity     Worry: Not on file     Inability: Not on file    Transportation needs     Medical: Not on file     Non-medical: Not on file   Tobacco Use    Smoking status: Never Smoker    Smokeless tobacco: Never Used   Substance and Sexual Activity    Alcohol use: No    Drug use: No    Sexual activity: Yes     Partners: Male     Comment: states sometimes   Lifestyle    Physical activity     Days per week: Not on file     Minutes per session: Not on file    Stress: Not on file   Relationships    Social connections     Talks on phone: Not on file     Gets together: Not on file     Attends Advent service: Not on file     Active member of club or organization: Not on file     Attends meetings of clubs or organizations: Not on file     Relationship status: Not on file    Intimate partner violence     Fear of current or ex partner: Not on file     Emotionally abused: Not on file     Physically abused: Not on file     Forced sexual activity: Not on file   Other Topics Concern    Not on file   Social History Narrative    Not on file     Current Facility-Administered Medications   Medication Dose Route Frequency Provider Last Rate Last Dose    acetaminophen (TYLENOL) tablet 650 mg  650 mg Oral Once Brooke Sanchez, DO         Current Outpatient Medications   Medication Sig Dispense Refill    SITagliptin (JANUVIA) 50 MG tablet Take 1 tablet by mouth daily 90 tablet 1    traZODone (DESYREL) 50 MG tablet Take 1 tablet by mouth nightly 90 tablet 1    spironolactone (ALDACTONE) 25 MG tablet Take 1 tablet by mouth daily as needed (swelling) 90 tablet 1    meclizine (ANTIVERT) 25 MG tablet Take 1 tablet by mouth 3 times daily as needed for Dizziness 90 tablet 1    venlafaxine (EFFEXOR XR) 75 MG extended release capsule Take 1 capsule by mouth daily 30 capsule 11    levothyroxine (SYNTHROID) 100 MCG tablet TAKE 1 TABLET BY MOUTH IN THE MORNING ON AN EMPTY STOMACH. 30 tablet 11    furosemide (LASIX) 40 MG tablet Take 1 tablet by mouth daily 30 tablet 11    omeprazole (PRILOSEC) 20 MG delayed release capsule TAKE 1 CAPSULE BY MOUTH DAILY 30 capsule 2    allopurinol (ZYLOPRIM) 100 MG tablet Take 1 tablet by mouth daily 30 tablet 5    lovastatin (MEVACOR) 40 MG tablet TAKE ONE TABLET DAILY 90 tablet 3    docusate sodium (STOOL SOFTENER LAXATIVE) 100 MG capsule TAKE ONE CAPSULE TWO TIMES A DAY AS NEEDED FOR CONSTIPATION 60 capsule 4    albuterol sulfate HFA (VENTOLIN HFA) 108 (90 Base) MCG/ACT inhaler Inhale 2 puffs into the lungs every 6 hours as needed for Wheezing 3 Inhaler 3    montelukast (SINGULAIR) 10 MG tablet Take 1 tablet by mouth nightly 90 tablet 3    famotidine (PEPCID) 40 MG tablet TAKE 1 TABLET BY MOUTH NIGHTLY 90 tablet 3    olopatadine (PATADAY) 0.2 % SOLN ophthalmic solution   5    Blood Glucose Monitoring Suppl ([employer] BLOOD GLUCOSE MONITOR) STEWART Use as directed for daily blood sugar testing 2 Device 3    blood glucose test strips ([employer] BLOOD GLUCOSE TEST) strip 1 each by In Vitro route daily As needed. 100 each 3    acetaminophen (TYLENOL) 650 MG CR tablet Take 1 tablet by mouth every 8 hours as needed for Pain 90 tablet 3    Misc. Devices (TUB TRANSFER BOARD) MISC Use as directed 1 each 0    aspirin EC 81 MG EC tablet Take 1 tablet by mouth daily. 30 tablet 5    UNABLE TO FIND Provide one electronic blood pressure cuff for home monitoring 1 each 0     Allergies   Allergen Reactions    Codeine Other (See Comments)     Gets \"jittery feeling\"         ROS:    Review of Systems   Constitutional: Positive for fatigue. Cardiovascular:        Cool feet; leg pain with ambulation    Musculoskeletal: Positive for gait problem and joint swelling. Recent L TKR   All other systems reviewed and are negative.       Nursing Notes Reviewed    Physical hernia is present. Musculoskeletal: Normal range of motion. General: No tenderness or deformity. Lymphadenopathy:      Cervical: No cervical adenopathy. Skin:     General: Skin is warm and dry. Coloration: Skin is not pale. Findings: No erythema or rash. Neurological:      General: No focal deficit present. Mental Status: She is alert and oriented to person, place, and time. Cranial Nerves: No cranial nerve deficit. Sensory: No sensory deficit. Deep Tendon Reflexes: Reflexes are normal and symmetric. Reflexes normal.   Psychiatric:         Speech: Speech normal.         Behavior: Behavior normal.         Thought Content:  Thought content normal.         Judgment: Judgment normal.         I have reviewed and interpreted all of the currently available lab results from this visit (ifapplicable):  Results for orders placed or performed during the hospital encounter of 09/22/20   CBC Auto Differential   Result Value Ref Range    WBC 12.1 (H) 4.0 - 10.5 K/CU MM    RBC 4.09 (L) 4.2 - 5.4 M/CU MM    Hemoglobin 12.3 (L) 12.5 - 16.0 GM/DL    Hematocrit 37.3 37 - 47 %    MCV 91.2 78 - 100 FL    MCH 30.1 27 - 31 PG    MCHC 33.0 32.0 - 36.0 %    RDW 13.2 11.7 - 14.9 %    Platelets 253 (L) 633 - 440 K/CU MM    MPV 10.7 7.5 - 11.1 FL    Differential Type AUTOMATED DIFFERENTIAL     Segs Relative 84.0 (H) 36 - 66 %    Lymphocytes % 7.7 (L) 24 - 44 %    Monocytes % 7.2 (H) 0 - 4 %    Eosinophils % 0.4 0 - 3 %    Basophils % 0.4 0 - 1 %    Segs Absolute 10.2 K/CU MM    Lymphocytes Absolute 0.9 K/CU MM    Monocytes Absolute 0.9 K/CU MM    Eosinophils Absolute 0.1 K/CU MM    Basophils Absolute 0.1 K/CU MM    Immature Neutrophil % 0.3 0 - 0.43 %    Total Immature Neutrophil 0.04 K/CU MM   Basic Metabolic Panel w/ Reflex to MG   Result Value Ref Range    Sodium 138 135 - 145 MMOL/L    Potassium 3.7 3.5 - 5.1 MMOL/L    Chloride 96 (L) 99 - 110 mMol/L    CO2 31 21 - 32 MMOL/L    Anion Gap 11 4 - 16    BUN 21 6 - 23 MG/DL    CREATININE 1.0 0.6 - 1.1 MG/DL    Glucose 165 (H) 70 - 99 MG/DL    Calcium 9.5 8.3 - 10.6 MG/DL    GFR Non-African American 52 (L) >60 mL/min/1.73m2    GFR African American >60 >60 mL/min/1.73m2   POCT Creatinine   Result Value Ref Range    POC Creatinine 1.0 0.6 - 1.1 MG/DL    GFR Non- 51 (L) >60 mL/min/1.73m2    GFR African American >60 >60 mL/min/1.73m2   EKG 12 Lead   Result Value Ref Range    Ventricular Rate 76 BPM    Atrial Rate 76 BPM    P-R Interval 174 ms    QRS Duration 104 ms    Q-T Interval 398 ms    QTc Calculation (Bazett) 447 ms    P Axis 72 degrees    R Axis -35 degrees    T Axis 25 degrees    Diagnosis       Sinus rhythm with marked sinus arrhythmia  Left axis deviation  Abnormal ECG  When compared with ECG of 16-MAR-2020 16:59,  No significant change was found        Radiographs (if obtained):  [] The following radiograph wasinterpreted by myself in the absence of a radiologist:   [] Radiologist's Report Reviewed:  CTA ABDOMINAL AORTA W BILAT RUNOFF W CONTRAST   Final Result   No large vessel occlusion or hemodynamic stenosis. Mild-to-moderate diffuse   atherosclerotic changes. Question perisplenic fluid versus capsular thickening versus phase of   contrast enhancement. Please correlate exam findings. Stable hepatic cysts and stable left adrenal lesion dating back to 2014               EKG (if obtained): (All EKG's are interpreted by myself in the absence of a cardiologist)      The 12 lead EKG was interpreted by me, and the interpretation is as follows:  normal sinus rhythm, rate = 76. Intervals are within the normal range. QTc is not prolonged. ST elevations are not present. T wave inversions are not present. Non-specific T wave changes are not present. Delta waves, Brugada Syndrome, and Short FL are not present. There is no acute ischemia. Chart review shows recent radiographs:  No results found.     MDM:      Presents ED with

## 2020-09-22 NOTE — ED TRIAGE NOTES
Patient arrived to room 3 by [facility] with complaints of lower leg pain that started yesterday and worsened today. Throughout triage patient complaining of whole body hurting, vague on symptoms.

## 2020-09-23 ENCOUNTER — CARE COORDINATION (OUTPATIENT)
Dept: CARE COORDINATION | Age: 85
End: 2020-09-23

## 2020-09-23 LAB
EKG ATRIAL RATE: 76 BPM
EKG DIAGNOSIS: NORMAL
EKG P AXIS: 72 DEGREES
EKG P-R INTERVAL: 174 MS
EKG Q-T INTERVAL: 398 MS
EKG QRS DURATION: 104 MS
EKG QTC CALCULATION (BAZETT): 447 MS
EKG R AXIS: -35 DEGREES
EKG T AXIS: 25 DEGREES
EKG VENTRICULAR RATE: 76 BPM

## 2020-09-23 PROCEDURE — 93010 ELECTROCARDIOGRAM REPORT: CPT | Performed by: INTERNAL MEDICINE

## 2020-09-24 ENCOUNTER — CARE COORDINATION (OUTPATIENT)
Dept: CARE COORDINATION | Age: 85
End: 2020-09-24

## 2020-09-25 ENCOUNTER — OFFICE VISIT (OUTPATIENT)
Dept: FAMILY MEDICINE CLINIC | Age: 85
End: 2020-09-25
Payer: MEDICARE

## 2020-09-25 VITALS
WEIGHT: 142 LBS | SYSTOLIC BLOOD PRESSURE: 118 MMHG | TEMPERATURE: 97.4 F | OXYGEN SATURATION: 92 % | HEART RATE: 66 BPM | BODY MASS INDEX: 27.73 KG/M2 | DIASTOLIC BLOOD PRESSURE: 62 MMHG | RESPIRATION RATE: 18 BRPM

## 2020-09-25 DIAGNOSIS — M79.604 BILATERAL LEG PAIN: ICD-10-CM

## 2020-09-25 DIAGNOSIS — M79.605 BILATERAL LEG PAIN: ICD-10-CM

## 2020-09-25 DIAGNOSIS — M79.10 MYALGIA: ICD-10-CM

## 2020-09-25 LAB
BILIRUBIN, POC: ABNORMAL
BLOOD URINE, POC: ABNORMAL
CLARITY, POC: ABNORMAL
COLOR, POC: ABNORMAL
GLUCOSE URINE, POC: ABNORMAL
KETONES, POC: ABNORMAL
LEUKOCYTE EST, POC: ABNORMAL
NITRITE, POC: POSITIVE
PH, POC: 6
PROTEIN, POC: 30
SPECIFIC GRAVITY, POC: 1.01
UROBILINOGEN, POC: 0.2
VITAMIN B-12: 769 PG/ML (ref 211–911)

## 2020-09-25 PROCEDURE — 81002 URINALYSIS NONAUTO W/O SCOPE: CPT | Performed by: PHYSICIAN ASSISTANT

## 2020-09-25 PROCEDURE — 99214 OFFICE O/P EST MOD 30 MIN: CPT | Performed by: PHYSICIAN ASSISTANT

## 2020-09-25 PROCEDURE — G0008 ADMIN INFLUENZA VIRUS VAC: HCPCS | Performed by: PHYSICIAN ASSISTANT

## 2020-09-25 PROCEDURE — 90686 IIV4 VACC NO PRSV 0.5 ML IM: CPT | Performed by: PHYSICIAN ASSISTANT

## 2020-09-25 RX ORDER — MECLIZINE HYDROCHLORIDE 25 MG/1
25 TABLET ORAL 3 TIMES DAILY PRN
Qty: 90 TABLET | Refills: 5 | Status: SHIPPED | OUTPATIENT
Start: 2020-09-25 | End: 2021-02-23 | Stop reason: SDUPTHER

## 2020-09-25 ASSESSMENT — PATIENT HEALTH QUESTIONNAIRE - PHQ9
SUM OF ALL RESPONSES TO PHQ QUESTIONS 1-9: 0
SUM OF ALL RESPONSES TO PHQ9 QUESTIONS 1 & 2: 0
2. FEELING DOWN, DEPRESSED OR HOPELESS: 0
SUM OF ALL RESPONSES TO PHQ QUESTIONS 1-9: 0
1. LITTLE INTEREST OR PLEASURE IN DOING THINGS: 0

## 2020-09-25 NOTE — PROGRESS NOTES
Vaccine Information Sheet, \"Influenza - Inactivated\"  given to Teresa Asif, or parent/legal guardian of  Teresa Asif and verbalized understanding. Patient responses:    Have you ever had a reaction to a flu vaccine? No  Do you have any current illness? No  Have you ever had [place] Syndrome? No  Do you have a serious allergy to any of the follow: Neomycin, Polymyxin, Thimerosal, eggs or egg products? No    Flu vaccine given per order. Please see immunization tab. Risks and benefits explained. Current VIS given.

## 2020-09-25 NOTE — PROGRESS NOTES
Josef Sheffield  8/3/1929  80 y.o.  female    SUBJECTIVE:    Chief Complaint   Patient presents with    Other     post er f/u    Diabetes    Leg Pain     patient was in the ER for lower body pains    Flu Vaccine     patient consents to flu vaccine today       HPI   Pt here for post ER visit-was seen for numbness to ruby arms/legs. Pt reports muscles ache at times in lower legs, seems to be worse with ambulation/better with rest. Pt is somewhat vague in history of pain. ER notes/labs/etc.. reviewed today. DM-chronic, compliant with diet at this time. Dysuria-mild x several days    Vertigo-chronic, x several years, uses antivert prn      Current Outpatient Medications on File Prior to Visit   Medication Sig Dispense Refill    SITagliptin (JANUVIA) 50 MG tablet Take 1 tablet by mouth daily 90 tablet 1    traZODone (DESYREL) 50 MG tablet Take 1 tablet by mouth nightly 90 tablet 1    spironolactone (ALDACTONE) 25 MG tablet Take 1 tablet by mouth daily as needed (swelling) 90 tablet 1    venlafaxine (EFFEXOR XR) 75 MG extended release capsule Take 1 capsule by mouth daily 30 capsule 11    levothyroxine (SYNTHROID) 100 MCG tablet TAKE 1 TABLET BY MOUTH IN THE MORNING ON AN EMPTY STOMACH.  30 tablet 11    furosemide (LASIX) 40 MG tablet Take 1 tablet by mouth daily 30 tablet 11    omeprazole (PRILOSEC) 20 MG delayed release capsule TAKE 1 CAPSULE BY MOUTH DAILY 30 capsule 2    allopurinol (ZYLOPRIM) 100 MG tablet Take 1 tablet by mouth daily 30 tablet 5    lovastatin (MEVACOR) 40 MG tablet TAKE ONE TABLET DAILY 90 tablet 3    docusate sodium (STOOL SOFTENER LAXATIVE) 100 MG capsule TAKE ONE CAPSULE TWO TIMES A DAY AS NEEDED FOR CONSTIPATION 60 capsule 4    albuterol sulfate HFA (VENTOLIN HFA) 108 (90 Base) MCG/ACT inhaler Inhale 2 puffs into the lungs every 6 hours as needed for Wheezing 3 Inhaler 3    montelukast (SINGULAIR) 10 MG tablet Take 1 tablet by mouth nightly 90 tablet 3    famotidine (PEPCID) 40 MG tablet TAKE 1 TABLET BY MOUTH NIGHTLY 90 tablet 3    olopatadine (PATADAY) 0.2 % SOLN ophthalmic solution   5    UNABLE TO FIND Provide one electronic blood pressure cuff for home monitoring 1 each 0    Blood Glucose Monitoring Suppl (EMBRACE BLOOD GLUCOSE MONITOR) STEWART Use as directed for daily blood sugar testing 2 Device 3    blood glucose test strips (EMBRACE BLOOD GLUCOSE TEST) strip 1 each by In Vitro route daily As needed. 100 each 3    acetaminophen (TYLENOL) 650 MG CR tablet Take 1 tablet by mouth every 8 hours as needed for Pain 90 tablet 3    Misc. Devices (TUB TRANSFER BOARD) MISC Use as directed 1 each 0    aspirin EC 81 MG EC tablet Take 1 tablet by mouth daily. 30 tablet 5     No current facility-administered medications on file prior to visit. Review of PMH, PSH, Family Hx, Allergies and updates made as needed.     PHQ Scores 9/25/2020 8/7/2020 8/6/2019 7/10/2019 4/10/2019 1/10/2019 10/18/2018   PHQ2 Score 0 1 0 0 1 0 0   PHQ9 Score 0 1 0 0 1 0 0     Interpretation of Total Score Depression Severity: 1-4 = Minimal depression, 5-9 = Mild depression, 10-14 = Moderate depression, 15-19 = Moderately severe depression, 20-27 = Severe depression      Allergies   Allergen Reactions    Codeine Other (See Comments)     Gets \"jittery feeling\"       Past Medical History:   Diagnosis Date    Allergic rhinitis     Anxiety     Arthritis     Bradycardia     Cellulitis, leg 1/8/2012    Depression     Diabetes mellitus (HCC)     sugars controlled off meds    Diabetic [facility] exam ([facility] [address] 1/28/16    no retinopathy    Gout     Hyperlipidemia     Hypertension     Hypothyroidism        Past Surgical History:   Procedure Laterality Date    APPENDECTOMY  13 yrs ago    CARPAL TUNNEL RELEASE      bilateral    COLONOSCOPY      ENDOSCOPY, COLON, DIAGNOSTIC  7/8/13    gastritis, hiatal hernia    EYE SURGERY      both eyes    HERNIA REPAIR      umbilical    HIP SURGERY Left 08/22/2016 ORIF    HYSTERECTOMY      complete    JOINT REPLACEMENT      left knee    THYROIDECTOMY, PARTIAL      ULNAR TUNNEL RELEASE      bilateral       Social History     Socioeconomic History    Marital status:      Spouse name: None    Number of children: None    Years of education: None    Highest education level: None   Occupational History    None   Social Needs    Financial resource strain: None    Food insecurity     Worry: None     Inability: None    Transportation needs     Medical: None     Non-medical: None   Tobacco Use    Smoking status: Never Smoker    Smokeless tobacco: Never Used   Substance and Sexual Activity    Alcohol use: No    Drug use: No    Sexual activity: Yes     Partners: Male     Comment: states sometimes   Lifestyle    Physical activity     Days per week: None     Minutes per session: None    Stress: None   Relationships    Social connections     Talks on phone: None     Gets together: None     Attends [employer] service: None     Active member of club or organization: None     Attends meetings of clubs or organizations: None     Relationship status: None    Intimate partner violence     Fear of current or ex partner: None     Emotionally abused: None     Physically abused: None     Forced sexual activity: None   Other Topics Concern    None   Social History Narrative    None       Review of Systems   Constitutional: Negative for chills and fever. HENT: Negative. Respiratory: Negative for cough and shortness of breath. Cardiovascular: Negative for chest pain. Gastrointestinal: Negative for abdominal pain. Endocrine: Negative for cold intolerance, heat intolerance, polydipsia, polyphagia and polyuria. Genitourinary: Positive for dysuria. Negative for frequency and urgency. Musculoskeletal: Positive for arthralgias, back pain and myalgias. Skin: Negative for rash. Neurological: Positive for dizziness. Negative for headaches.    Hematological: Return in about 4 months (around 1/25/2021).

## 2020-09-26 LAB — MAGNESIUM: 2.2 MG/DL (ref 1.8–2.4)

## 2020-09-28 PROBLEM — R30.0 DYSURIA: Status: ACTIVE | Noted: 2020-09-28

## 2020-09-28 PROBLEM — M79.605 BILATERAL LEG PAIN: Status: ACTIVE | Noted: 2020-09-28

## 2020-09-28 PROBLEM — Z23 FLU VACCINE NEED: Status: ACTIVE | Noted: 2020-09-28

## 2020-09-28 PROBLEM — M79.604 BILATERAL LEG PAIN: Status: ACTIVE | Noted: 2020-09-28

## 2020-09-28 PROBLEM — M79.10 MYALGIA: Status: ACTIVE | Noted: 2020-09-28

## 2020-09-28 PROBLEM — I73.9 CLAUDICATION (HCC): Status: ACTIVE | Noted: 2020-09-28

## 2020-09-28 LAB
ORGANISM: ABNORMAL
URINE CULTURE, ROUTINE: ABNORMAL
URINE CULTURE, ROUTINE: ABNORMAL

## 2020-09-28 RX ORDER — NITROFURANTOIN 25; 75 MG/1; MG/1
100 CAPSULE ORAL 2 TIMES DAILY
Qty: 14 CAPSULE | Refills: 0 | Status: SHIPPED | OUTPATIENT
Start: 2020-09-28 | End: 2020-10-05

## 2020-09-28 ASSESSMENT — ENCOUNTER SYMPTOMS
BACK PAIN: 1
ABDOMINAL PAIN: 0
SHORTNESS OF BREATH: 0
COUGH: 0

## 2020-10-01 ENCOUNTER — TELEPHONE (OUTPATIENT)
Dept: FAMILY MEDICINE CLINIC | Age: 85
End: 2020-10-01

## 2020-10-01 NOTE — TELEPHONE ENCOUNTER
Mack from [facility] called to request verbal order for recert of homecare for med minder. Verbal order given. No further action needed.

## 2020-10-15 RX ORDER — OMEPRAZOLE 20 MG/1
20 CAPSULE, DELAYED RELEASE ORAL DAILY
Qty: 30 CAPSULE | Refills: 2 | Status: SHIPPED | OUTPATIENT
Start: 2020-10-15 | End: 2021-01-18

## 2020-11-18 ENCOUNTER — TELEPHONE (OUTPATIENT)
Dept: FAMILY MEDICINE CLINIC | Age: 85
End: 2020-11-18

## 2020-11-18 NOTE — TELEPHONE ENCOUNTER
Mahesh Sanders called and stated patient has been exposed to covid by her aide she is not currently having any symptoms she does not leave the house.  She is wondering if she should be tested please advised

## 2020-11-19 NOTE — TELEPHONE ENCOUNTER
It is up to pt about testing. I can put order in and she can go to [facility] tomorrow from 10 am to 2 pm (pull up in front of hospital and call # posted for further instructions), ER if she develops cough/sob/diff breathing. [facility].

## 2020-12-02 ENCOUNTER — TELEPHONE (OUTPATIENT)
Dept: FAMILY MEDICINE CLINIC | Age: 85
End: 2020-12-02

## 2020-12-02 NOTE — TELEPHONE ENCOUNTER
prem called requesting a order to continue home care for the patient-I called her back and gave a verbal order to continue home care-she verbalizes understanding-aniya

## 2021-01-13 DIAGNOSIS — K21.9 GASTROESOPHAGEAL REFLUX DISEASE WITHOUT ESOPHAGITIS: ICD-10-CM

## 2021-01-18 ENCOUNTER — TELEPHONE (OUTPATIENT)
Dept: FAMILY MEDICINE CLINIC | Age: 86
End: 2021-01-18

## 2021-01-18 RX ORDER — OMEPRAZOLE 20 MG/1
20 CAPSULE, DELAYED RELEASE ORAL DAILY
Qty: 30 CAPSULE | Refills: 2 | Status: SHIPPED | OUTPATIENT
Start: 2021-01-18 | End: 2021-02-23 | Stop reason: SDUPTHER

## 2021-01-18 NOTE — TELEPHONE ENCOUNTER
Called patient and informed her to schedule for COVID vaccine. Patient states she is not sure if she wants to get vaccine or not.

## 2021-02-10 ENCOUNTER — TELEPHONE (OUTPATIENT)
Dept: FAMILY MEDICINE CLINIC | Age: 86
End: 2021-02-10

## 2021-02-17 NOTE — TELEPHONE ENCOUNTER
;      [facility] Emergency Department  [address], [place] [address]  [phone]     Clinical Summary     PERSON INFORMATION   Name CHRISTINE ALVARADO Age  29 Years  1989 12:00 AM   Acct# [id number] Sex Female Phone [phone]   Dispo Type Home - (i.e. Home on oxygen, DME)-  Arrival 2019 4:21 PM Checkout 2019 6:46 PM    Address: [address] [place] [address]      Visit Reason RLQ PAIN     ED Physician Note     Patient:   CHRISTINE ALVARADO            MRN: [id number]            FIN: [id number]               Age:   29 years     Sex:  Female     :  1989   Associated Diagnoses:   None   Author:   Trish Rae      Basic Information   History source: Patient.   Arrival mode: Private vehicle, walking.      History of Present Illness   This 29-year-old female presents today for evaluation of right lower quadrant pain sent over from the immediate care.  Patient notes that she initially presented to the immediate care because she has been having vaginal discharge for the last 4 days, notes it is thick and white and does have a mild odor.  Patient does note that she did having right lower abdominal pain for roughly 2 days now however states she has PCO S and contributed this to cysts.  She notes that over the last 2 weeks she has had intermittent constipation and diarrhea, notes currently she is constipated.  She does note intermittent nausea over the last few weeks.  She denies any vomiting or fevers.  She denies any other symptoms.  PCP: Zac ABREUGYN: None      Review of Systems   Constitutional symptoms:  No fever,    Skin symptoms:  No rash,    Eye symptoms:  No recent vision problems,    ENMT symptoms:  No sore throat,    Respiratory symptoms:  No shortness of breath,    Cardiovascular symptoms:  No chest pain,    Gastrointestinal symptoms:  Abdominal pain, mild, right lower quadrant, nausea, diarrhea, constipation, No vomiting,   I agree with the Stas Balderas   Genitourinary symptoms:  Vaginal discharge, No dysuria,    Musculoskeletal symptoms:  No Joint pain,    Neurologic symptoms:  No headache,    Psychiatric symptoms:  Negative except as documented in HPI.             Additional review of systems information: All other systems reviewed and otherwise negative.      Health Status   Allergies:    Allergic Reactions (Selected)  Severity Not Documented  Lexapro- Rash..   Menstrual history: Last menstrual period: Date 12/10/2018.      Past Medical/ Family/ Social History      Medical history   Genitourinary: ovarian cyst, PCOS.   Surgical history: Dilatation and curettage.   Family history: Diabetes mellitus.   Social history: Alcohol use: Occasionally, Tobacco use: 10 cigarettes per day.      Physical Examination               Vital Signs   Vital Signs   1/23/2019 17:44          Peripheral Pulse Rate     75 bpm                             Respiratory Rate          18 br/min                             Systolic Blood Pressure   124 mmHg                             Diastolic Blood Pressure  78 mmHg                             Mean Arterial Pressure    93 mmHg                             Oxygen Saturation         98 %    1/23/2019 16:22          Temperature Oral          97.5 F  LOW                             Peripheral Pulse Rate     74 bpm                             Respiratory Rate          16 br/min                             Systolic Blood Pressure   100 mmHg                             Diastolic Blood Pressure  73 mmHg                             Mean Arterial Pressure    82 mmHg                             Oxygen Saturation         98 %  .               Per nurse's notes.   Vital Signs were reviewed.   Pulse Ox > 95% on Room Air which is normal for this patient.   General:  Alert, no acute distress.    Skin:  Warm, dry.    Head:  Normocephalic, atraumatic.    Neck:  Supple, trachea midline.    Eye:  Normal conjunctiva.   Ears, nose, mouth and throat:  Oral mucosa  moist.   Cardiovascular:  Regular rate and rhythm, No murmur, S1, S2.    Respiratory:  Lungs are clear to auscultation, respirations are non-labored, Symmetrical chest wall expansion.    Gastrointestinal:  Soft, Non distended, Tenderness: Mild, suprapubic, right lower quadrant, Guarding: Negative, Rebound: Negative, Bowel sounds: Normal.    Genitourinary:  Speculum exam: Cervix closed, mild, discharge white, no tenderness, no blood clots.   Back:  Normal range of motion.   Musculoskeletal:  Normal ROM, no swelling, no deformity.    Neurological:  No focal neurological deficit observed.   Psychiatric:  Cooperative, appropriate mood & affect.       Medical Decision Making   Differential Diagnosis:  Abdominal pain, Appendicitis, bowel obstruction, bowel perforation, renal stone, ureteral stone, irritable bowel syndrome, urinary tract infection, gastroenteritis, gastritis, constipation, ovarian cyst.    Rationale:  29-year-old female present for evaluation of abdominal pain and vaginal discharge as above.  On arrival she is afebrile, not tachycardic, nontoxic.  Various etiologies considered including but not limited to those listed.  See physical above.  Patient was given Toradol today after a negative urine pregnancy test.  CBC without leukocytosis nor anemia.  CMP is unremarkable.  Urinalysis without UTI, sent for culture.  Patient was negative for trichomonas, clue cells and yeast today.  Gonorrhea and chlamydia pending.   Patient refused STD treatment today.  CT of the abdomen and pelvis shows constipation without appendicitis, diverticulitis or other intra-abdominal or pelvic etiology, see report below.  At this time is determined patient's pain symptoms are most likely secondary to her constipation.  She will be sent home with a stool softener.  No further workup warranted today.  Patient does not have a surgical or infectious abdomen, but the possibility was discussed in detail with the patient, as well as strict  return precautions. Patient voiced understanding of this discussion.  I explained to the patient that an emergency department evaluation is not exhaustive and it is important to follow up with a primary care physician or specialist as discussed, and to return to the emergency department if symptoms are not improving or are worsening. The patient verbalized understanding of these follow up instructions and return precautions.  .   Results review:  Lab results : Lab View   1/23/2019 17:19          UA Appear                 Cloudy                             UA Color                  Yellow                             UA pH                     8.5  HI                             UA Spec Grav              1.020                             UA Glucose                Negative                             UA Bili                   Negative                             UA Ketones                Negative                             UA Blood                  Negative                             UA Protein                Trace                             UA Urobilinogen           0.2 mg/dL                             UA Nitrite                Negative                             UA Leuk Est               Negative                             UA Epithelial             1 - 5/HPF                             UA RBC                    None Seen                             UA WBC                    Rare                             UA Bacteria               None Seen                             UA Mucous                 Few                             UA Crystal                Amorphous Phosphat    1/23/2019 17:17          Urine Preg POC            Negative    1/23/2019 17:08          WBC, wet prep             Few                             Trichomonas               Absent                             Yeast, wet prep           None Seen                             Clue Cells                Absent    1/23/2019 16:45          Glucose Lvl                91 mg/dL                             BUN                       7 mg/dL                             Creatinine                0.83 mg/dL                             eGFR AfrAmer              >60 mL/min/1.73m2  NA                             eGFR NonAfrAmer           >60 mL/min/1.73m2  NA                             Sodium                    139 mEq/L                             Potassium                 4.1 mEq/L                             Chloride                  109 mEq/L  HI                             TCO2                      21 mEq/L                             AGAP                      13 mEq/L                             Calcium                   9.3 mg/dL                             Alk Phos                  49 unit/L  LOW                             Bili Total                0.5 mg/dL                             Total Protein             7.1 g/dL                             Albumin                   3.9 g/dL                             Globulin_                 3.2 g/dL                             A/G Ratio_                1.2  NA                             AST/GOT                   16 unit/L                             ALT/GPT                   8 unit/L                             Lipase Level              17 unit/L                             WBC                       7.4 K/cumm                             RBC                       4.94 M/cumm                             Hgb                       12.8 g/dL                             Hct                       40 %                             MCV                       82 FL                             MCH                       26 pg                             MCHC                      32 g/dL                             RDW                       15.9 %  HI                             Platelet                  220 K/cumm                             NRBC                      0.0 %                             Abs Neutro                4.1  K/cumm                             Neutrophil                55 %  NA                             Abs Lymph                 2.3 K/cumm                             Lymphocyte                31 %  NA                             Abs Mono                  0.7 K/cumm                             Monocyte                  10 %  NA                             Immature Gran             0.3 %                             Eosinophil                4 %                             Basophil                  0 %  .   Radiology results:  Report  EXAM: A CT scan of the abdomen and pelvis  with contrast was  performed.    COMPARISON: CT scan of the abdomen and pelvis performed on 02/08/2018.   Comparison is also made to a CT scan of the abdomen and pelvis  performed on 12/09/2013.    CLINICAL INDICATION:  Abdominal Pain    TECHNIQUE: Multiple axial scans were obtained from the level of the  diaphragms to the pelvic floor. 75 cc of Omnipaque 350 contrast  material was injected. No oral contrast material was administered.  Evaluation of the bowel is therefore limited. Coronal and sagittal  reformatted images were generated from the axial data. Adjustment of  the mA and/or kV was done based on the patient's size to minimize the  radiation dose.    FINDINGS: Heart size is within normal limits.  There is no pericardial  effusion.  There is a noncalcified mass in the left lower lobe.  This  measures 1.2 cm in size.  This mass was present on the previous study.   It has not changed significantly in the interim.  This mass was also  present on the CT scan of 2013.  At that time it measured up to 1.5  cm.    There is no abnormality of the liver or spleen.  The gallbladder is  contracted.  The pancreas is unremarkable.  The adrenal glands appear  normal.  There are small bilateral renal cysts.  There are no signs of  obstructive uropathy.    There is no free fluid within the abdomen or pelvis.  There is  evidence of mild to moderate  constipation.  There are no signs of  bowel obstruction.  There are no inflammatory changes involving the  bowel.  The appendix is retrocecal.  It has a normal appearance.  The  uterus and adnexa are unremarkable.  The bladder is grossly normal.    IMPRESSION: No acute intra-abdominal or pelvic pathology is  identified.  There is evidence of constipation.  There are no signs of  bowel obstruction.  The appendix has a normal appearance.  The  gallbladder is contracted and cannot be evaluated.  Note is made of a  1.2 cm mass in the left lower lobe.  This is not calcified.  It  appears relatively unchanged from the CT of 02/08/2018.  It was  measuring larger on a CT scan performed in 2013.    Signature Line  *** Final ***    Dictated by:  Swetha Aguirre MD  Electronically Signed By:  Swetha Aguirre MD  on  01/23/2019 18:24  Technologist Initials:  TATIANNA    .      Reexamination/ Reevaluation   Patient reevaluated, resting comfortably, nontoxic.      Impression and Plan   Diagnosis   constipation  abdominal pain     Plan   Condition: Improved, Stable.    Disposition: Medically cleared, Discharged: to home.    Prescriptions: Launch prescriptions   Pharmacy:  docusate sodium 100 mg oral capsule (Prescribe): 100 mg, 1 cap(s), PO, BID, PRN: for constipation, 20 cap(s), 0 Refill(s).    Patient was given the following educational materials: Abdominal Pain, Women.    Follow up with: Follow up with primary care provider Within 2 to 3 days Call for follow up appointment  Follow-Up As Directed    Take stool softener as directed.  Drink lots of water.  Eat a high-fiber diet.; Return to Emergency Department Return if symptoms worsen, uncontrolled vomiting, fever, no longer urinating, new symptoms.    Counseled: Patient, Family, Regarding diagnosis, Regarding diagnostic results, Regarding treatment plan, Regarding prescription, Patient indicated understanding of instructions.        ED Time Seen By Provider Entered On:  1/23/2019  16:25     Performed On:  1/23/2019 16:25  by Trinity Perez               Time Seen By Provider   Time Seen by Provider :   1/23/2019 16:25    Trinity Perez - 1/23/2019 16:25           VITALS INFORMATION  Vitals/Ht/Wt  Temperature Oral:  97.5 F  Peripheral Pulse Rate:  74 bpm  Respiratory Rate:  16 br/min  Oxygen Saturation:  98 %  Oxygen Therapy:  Room air  Systolic Blood Pressure:  100 mmHg  Diastolic Blood Pressure:  73 mmHg  Mean Arterial Pressure:  82 mmHg  Height:  170 cm  Weight:  49.5 kg  Weight Type:  Measured       MEDICAL INFORMATION   Allergy Info:          Lexapro             Prescriptions:                  Prescription Display   docusate (docusate sodium 100 mg oral capsule) 100 mg = 1 cap(s), PO, Cap, BID, PRN for constipation, # 20 cap(s), 0 Refill(s)          DISCHARGE INFORMATION   Discharge Disposition: Home - (i.e. Home on oxygen, DME)- 01     PATIENT EDUCATION INFORMATION   Instructions:       Abdominal Pain, Women   Follow up:                  With: Address: When:   Return to Emergency Department     Comments:   Return if symptoms worsen, uncontrolled vomiting, fever, no longer urinating, new symptoms       With: Address: When:   Follow up with primary care provider  Within 2 to 3 days   Comments:   Call for follow up appointment   Follow-Up As Directed     Take stool softener as directed.   Drink lots of water.  Eat a high-fiber diet.            DIAGNOSIS

## 2021-02-23 ENCOUNTER — OFFICE VISIT (OUTPATIENT)
Dept: FAMILY MEDICINE CLINIC | Age: 86
End: 2021-02-23
Payer: MEDICARE

## 2021-02-23 VITALS
RESPIRATION RATE: 14 BRPM | BODY MASS INDEX: 26.56 KG/M2 | WEIGHT: 136 LBS | SYSTOLIC BLOOD PRESSURE: 100 MMHG | DIASTOLIC BLOOD PRESSURE: 60 MMHG | OXYGEN SATURATION: 96 % | HEART RATE: 86 BPM | TEMPERATURE: 97.3 F

## 2021-02-23 DIAGNOSIS — F51.01 PRIMARY INSOMNIA: ICD-10-CM

## 2021-02-23 DIAGNOSIS — K21.9 GASTROESOPHAGEAL REFLUX DISEASE WITHOUT ESOPHAGITIS: ICD-10-CM

## 2021-02-23 DIAGNOSIS — N18.31 STAGE 3A CHRONIC KIDNEY DISEASE (HCC): ICD-10-CM

## 2021-02-23 DIAGNOSIS — I50.32 CHRONIC DIASTOLIC CONGESTIVE HEART FAILURE (HCC): Chronic | ICD-10-CM

## 2021-02-23 DIAGNOSIS — E11.9 TYPE 2 DIABETES MELLITUS WITHOUT COMPLICATION, WITHOUT LONG-TERM CURRENT USE OF INSULIN (HCC): Primary | ICD-10-CM

## 2021-02-23 DIAGNOSIS — D47.2 MONOCLONAL GAMMOPATHIES: Chronic | ICD-10-CM

## 2021-02-23 DIAGNOSIS — E03.9 ACQUIRED HYPOTHYROIDISM: Chronic | ICD-10-CM

## 2021-02-23 DIAGNOSIS — R42 VERTIGO: ICD-10-CM

## 2021-02-23 PROBLEM — Z23 FLU VACCINE NEED: Status: RESOLVED | Noted: 2020-09-28 | Resolved: 2021-02-23

## 2021-02-23 PROBLEM — Z71.89 ACP (ADVANCE CARE PLANNING): Status: RESOLVED | Noted: 2020-08-10 | Resolved: 2021-02-23

## 2021-02-23 PROBLEM — R30.0 DYSURIA: Status: RESOLVED | Noted: 2020-09-28 | Resolved: 2021-02-23

## 2021-02-23 LAB
BASOPHILS ABSOLUTE: 0 K/UL (ref 0–0.2)
BASOPHILS RELATIVE PERCENT: 0.7 %
EOSINOPHILS ABSOLUTE: 0.2 K/UL (ref 0–0.6)
EOSINOPHILS RELATIVE PERCENT: 3.1 %
HBA1C MFR BLD: 5.4 %
HCT VFR BLD CALC: 36.6 % (ref 36–48)
HEMOGLOBIN: 12.5 G/DL (ref 12–16)
LYMPHOCYTES ABSOLUTE: 1 K/UL (ref 1–5.1)
LYMPHOCYTES RELATIVE PERCENT: 16.6 %
MCH RBC QN AUTO: 30.2 PG (ref 26–34)
MCHC RBC AUTO-ENTMCNC: 34.1 G/DL (ref 31–36)
MCV RBC AUTO: 88.6 FL (ref 80–100)
MONOCYTES ABSOLUTE: 0.4 K/UL (ref 0–1.3)
MONOCYTES RELATIVE PERCENT: 6.8 %
NEUTROPHILS ABSOLUTE: 4.5 K/UL (ref 1.7–7.7)
NEUTROPHILS RELATIVE PERCENT: 72.8 %
PDW BLD-RTO: 14.4 % (ref 12.4–15.4)
PLATELET # BLD: 140 K/UL (ref 135–450)
PMV BLD AUTO: 10.2 FL (ref 5–10.5)
RBC # BLD: 4.13 M/UL (ref 4–5.2)
WBC # BLD: 6.2 K/UL (ref 4–11)

## 2021-02-23 PROCEDURE — 83036 HEMOGLOBIN GLYCOSYLATED A1C: CPT | Performed by: PHYSICIAN ASSISTANT

## 2021-02-23 PROCEDURE — 99214 OFFICE O/P EST MOD 30 MIN: CPT | Performed by: PHYSICIAN ASSISTANT

## 2021-02-23 RX ORDER — TRAZODONE HYDROCHLORIDE 50 MG/1
50 TABLET ORAL NIGHTLY
Qty: 90 TABLET | Refills: 3 | Status: SHIPPED | OUTPATIENT
Start: 2021-02-23 | End: 2021-12-29 | Stop reason: SDUPTHER

## 2021-02-23 RX ORDER — OMEPRAZOLE 20 MG/1
20 CAPSULE, DELAYED RELEASE ORAL DAILY
Qty: 90 CAPSULE | Refills: 3 | Status: SHIPPED | OUTPATIENT
Start: 2021-02-23 | End: 2021-12-29 | Stop reason: SDUPTHER

## 2021-02-23 RX ORDER — MONTELUKAST SODIUM 10 MG/1
10 TABLET ORAL NIGHTLY
Qty: 90 TABLET | Refills: 3 | Status: SHIPPED | OUTPATIENT
Start: 2021-02-23 | End: 2022-01-13

## 2021-02-23 RX ORDER — MECLIZINE HYDROCHLORIDE 25 MG/1
25 TABLET ORAL 3 TIMES DAILY PRN
Qty: 90 TABLET | Refills: 5 | Status: SHIPPED | OUTPATIENT
Start: 2021-02-23 | End: 2021-05-21 | Stop reason: SDUPTHER

## 2021-02-23 ASSESSMENT — ENCOUNTER SYMPTOMS
EYES NEGATIVE: 1
BACK PAIN: 1
COUGH: 0
ABDOMINAL PAIN: 0
COLOR CHANGE: 0
SHORTNESS OF BREATH: 0

## 2021-02-23 NOTE — PROGRESS NOTES
Kurt Emmanuel  8/3/1929  80 y.o.  female    SUBJECTIVE:    Chief Complaint   Patient presents with   [facility] Fall     tripped three days ago over walker  body feeling stiff       HPI   Pt here for 6 month f/u. Overall doing well. Did trip over walker three days ago-pt states her foot got caught in wheel and she fell onto knees. Pt did call squad to get her off floor but did not need transport to ER. Does still have soreness in knees but pt states it is 'getting better everyday\". DM-well controlled, compliant with meds,  Denies polyuria/polydipsia/polyphagia at this time. Hypothyroidism-acquired, compliant with meds. CHF-stable, on lasix, has not been taking spironolactone. No cp/sob/edema/wt gain. GERD-well controlled with current meds    CKD-stable, needs labs today    Monoclonal gammopathies-chronic, last labs 6 months ago, will repeat today. insomina-primary, doing well with trazodone at bedtime. Vertigo-chronic, using antivert prn. PHQ Scores 9/25/2020 8/7/2020 8/6/2019 7/10/2019 4/10/2019 1/10/2019 10/18/2018   PHQ2 Score 0 1 0 0 1 0 0   PHQ9 Score 0 1 0 0 1 0 0     Interpretation of Total Score Depression Severity: 1-4 = Minimal depression, 5-9 = Mild depression, 10-14 = Moderate depression, 15-19 = Moderately severe depression, 20-27 = Severe depression     Current Outpatient Medications on File Prior to Visit   Medication Sig Dispense Refill    allopurinol (ZYLOPRIM) 100 MG tablet TAKE 1 TABLET BY MOUTH DAILY 90 tablet 3    famotidine (PEPCID) 40 MG tablet TAKE 1 TABLET BY MOUTH NIGHTLY 90 tablet 3    docusate sodium (DOK) 100 MG capsule TAKE ONE CAPSULE TWO TIMES A DAY AS NEEDED FOR CONSTIPATION 60 capsule 5    venlafaxine (EFFEXOR XR) 75 MG extended release capsule Take 1 capsule by mouth daily 30 capsule 11    levothyroxine (SYNTHROID) 100 MCG tablet TAKE 1 TABLET BY MOUTH IN THE MORNING ON AN EMPTY STOMACH.  30 tablet 11  furosemide (LASIX) 40 MG tablet Take 1 tablet by mouth daily 30 tablet 11    lovastatin (MEVACOR) 40 MG tablet TAKE ONE TABLET DAILY 90 tablet 3    albuterol sulfate HFA (VENTOLIN HFA) 108 (90 Base) MCG/ACT inhaler Inhale 2 puffs into the lungs every 6 hours as needed for Wheezing 3 Inhaler 3    Blood Glucose Monitoring Suppl ([employer] BLOOD GLUCOSE MONITOR) STEWART Use as directed for daily blood sugar testing 2 Device 3    blood glucose test strips ([employer]ACE BLOOD GLUCOSE TEST) strip 1 each by In Vitro route daily As needed. 100 each 3    acetaminophen (TYLENOL) 650 MG CR tablet Take 1 tablet by mouth every 8 hours as needed for Pain 90 tablet 3    Misc. Devices (TUB TRANSFER BOARD) MISC Use as directed 1 each 0    aspirin EC 81 MG EC tablet Take 1 tablet by mouth daily. 30 tablet 5    UNABLE TO FIND rollator walker with seat and brakes 1 Units 0    olopatadine (PATADAY) 0.2 % SOLN ophthalmic solution   5    UNABLE TO FIND Provide one electronic blood pressure cuff for home monitoring 1 each 0     No current facility-administered medications on file prior to visit.         Allergies   Allergen Reactions    Codeine Other (See Comments)     Gets \"jittery feeling\"       Past Medical History:   Diagnosis Date    ACP (advance care planning) 8/10/2020    Allergic rhinitis     Anxiety     Arthritis     Bradycardia     Cellulitis, leg 1/8/2012    Depression     Diabetes mellitus ([address]     sugars controlled off meds    Diabetic eye exam ([facility] [address] 1/28/16    no retinopathy    Dysuria 9/28/2020    Flu vaccine need 9/28/2020    Gout     Hyperlipidemia     Hypertension     Hypothyroidism        Past Surgical History:   Procedure Laterality Date    APPENDECTOMY  13 yrs ago    CARPAL TUNNEL RELEASE      bilateral    COLONOSCOPY      ENDOSCOPY, COLON, DIAGNOSTIC  7/8/13    gastritis, hiatal hernia    EYE SURGERY      both eyes    HERNIA REPAIR      umbilical    HIP SURGERY Left 08/22/2016    ORIF  HYSTERECTOMY      complete    JOINT REPLACEMENT      left knee    THYROIDECTOMY, PARTIAL      ULNAR TUNNEL RELEASE      bilateral       Social History     Socioeconomic History    Marital status:      Spouse name: None    Number of children: None    Years of education: None    Highest education level: None   Occupational History    None   Social Needs    Financial resource strain: None    Food insecurity     Worry: None     Inability: None    Transportation needs     Medical: None     Non-medical: None   Tobacco Use    Smoking status: Never Smoker    Smokeless tobacco: Never Used   Substance and Sexual Activity    Alcohol use: Yes     Alcohol/week: 1.0 standard drinks     Types: 1 Cans of beer per week     Comment: may be less than once weekly    Drug use: No    Sexual activity: Yes     Partners: Male     Comment: states sometimes   Lifestyle    Physical activity     Days per week: None     Minutes per session: None    Stress: None   Relationships    Social connections     Talks on phone: None     Gets together: None     Attends [employer] service: None     Active member of club or organization: None     Attends meetings of clubs or organizations: None     Relationship status: None    Intimate partner violence     Fear of current or ex partner: None     Emotionally abused: None     Physically abused: None     Forced sexual activity: None   Other Topics Concern    None   Social History Narrative    None       Review of Systems   Constitutional: Negative. HENT: Negative. Eyes: Negative. Respiratory: Negative for cough and shortness of breath. Cardiovascular: Negative for chest pain. Gastrointestinal: Negative for abdominal pain. Endocrine: Negative for polydipsia, polyphagia and polyuria. Musculoskeletal: Positive for arthralgias, back pain, gait problem and neck pain. Skin: Negative for color change. Neurological: Positive for dizziness and numbness. Chronic dizziness, uses antivert prn with good relief  ruby feet numbness   Psychiatric/Behavioral: Positive for sleep disturbance. Negative for dysphoric mood. The patient is not nervous/anxious. OBJECTIVE:    /60 (Site: Left Upper Arm)   Pulse 86   Temp 97.3 °F (36.3 °C) (Temporal)   Resp 14   Wt 136 lb (61.7 kg)   SpO2 96%   BMI 26.56 kg/m²     Physical Exam  Vitals signs reviewed. Constitutional:       General: She is not in acute distress. HENT:      Head: Normocephalic. Right Ear: External ear normal.      Left Ear: External ear normal.   Eyes:      Extraocular Movements: Extraocular movements intact. Cardiovascular:      Rate and Rhythm: Normal rate and regular rhythm. Pulses: Normal pulses. Heart sounds: Murmur present. Pulmonary:      Effort: Pulmonary effort is normal.   Abdominal:      Palpations: Abdomen is soft. Musculoskeletal:         General: Tenderness present. Lumbar back: She exhibits decreased range of motion and tenderness. Left upper leg: She exhibits tenderness. Legs:    Skin:     General: Skin is warm and dry. Neurological:      Mental Status: She is alert and oriented to person, place, and time. Psychiatric:         Mood and Affect: Mood normal.         Nirmal Breed:    Problem List        Circulatory    Diastolic CHF (HCC) (Chronic)     No edema noted today, blood pressure 100/60.  Will continue lasix but stop spironolactone at this time, recheck in 3 months, sooner if sob/wt gain/increased edema         Relevant Medications    UNABLE TO FIND    furosemide (LASIX) 40 MG tablet       Digestive    Gastroesophageal reflux disease without esophagitis    Relevant Medications    docusate sodium (DOK) 100 MG capsule    famotidine (PEPCID) 40 MG tablet    omeprazole (PRILOSEC) 20 MG delayed release capsule    meclizine (ANTIVERT) 25 MG tablet       Endocrine Type 2 diabetes mellitus without complication, without long-term current use of insulin ([facility]) - Primary     A1C 5.4 today, will stop [place] [facility] [place] and recheck A1C in 3 months         Relevant Medications    Blood Glucose Monitoring Suppl ([employer] BLOOD GLUCOSE MONITOR) STEWART    blood glucose test strips ([employer] BLOOD GLUCOSE TEST) strip    Other Relevant Orders    POCT glycosylated hemoglobin (Hb A1C) (Completed)    Hypothyroidism (Chronic)    Relevant Medications    levothyroxine (SYNTHROID) 100 MCG tablet    Other Relevant Orders    TSH WITH REFLEX TO FT4       Genitourinary    CKD (chronic kidney disease), stage III    Relevant Orders    Comprehensive Metabolic Panel    CBC WITH AUTO DIFFERENTIAL       Other    Vertigo     Refill antivert         Relevant Medications    meclizine (ANTIVERT) 25 MG tablet    Primary insomnia    Relevant Medications    traZODone (DESYREL) 50 MG tablet    Monoclonal gammopathies (Chronic)    Relevant Orders    CBC WITH AUTO DIFFERENTIAL               Return in about 3 months (around 5/23/2021).

## 2021-02-23 NOTE — ASSESSMENT & PLAN NOTE
No edema noted today, blood pressure 100/60.  Will continue lasix but stop spironolactone at this time, recheck in 3 months, sooner if sob/wt gain/increased edema

## 2021-02-24 LAB
A/G RATIO: 2.2 (ref 1.1–2.2)
ALBUMIN SERPL-MCNC: 4.6 G/DL (ref 3.4–5)
ALP BLD-CCNC: 74 U/L (ref 40–129)
ALT SERPL-CCNC: 13 U/L (ref 10–40)
ANION GAP SERPL CALCULATED.3IONS-SCNC: 12 MMOL/L (ref 3–16)
AST SERPL-CCNC: 22 U/L (ref 15–37)
BILIRUB SERPL-MCNC: 0.3 MG/DL (ref 0–1)
BUN BLDV-MCNC: 16 MG/DL (ref 7–20)
CALCIUM SERPL-MCNC: 9.5 MG/DL (ref 8.3–10.6)
CHLORIDE BLD-SCNC: 101 MMOL/L (ref 99–110)
CO2: 29 MMOL/L (ref 21–32)
CREAT SERPL-MCNC: 0.9 MG/DL (ref 0.6–1.2)
GFR AFRICAN AMERICAN: >60
GFR NON-AFRICAN AMERICAN: 59
GLOBULIN: 2.1 G/DL
GLUCOSE BLD-MCNC: 133 MG/DL (ref 70–99)
POTASSIUM SERPL-SCNC: 3.8 MMOL/L (ref 3.5–5.1)
SODIUM BLD-SCNC: 142 MMOL/L (ref 136–145)
TOTAL PROTEIN: 6.7 G/DL (ref 6.4–8.2)
TSH REFLEX FT4: 1.59 UIU/ML (ref 0.27–4.2)

## 2021-03-30 ENCOUNTER — TELEPHONE (OUTPATIENT)
Dept: FAMILY MEDICINE CLINIC | Age: 86
End: 2021-03-30

## 2021-03-30 NOTE — TELEPHONE ENCOUNTER
Verbal oder given to [place] [id number] nurse for weekly visits to continue to assist with medication and to access any other health needs.

## 2021-04-15 ENCOUNTER — TELEPHONE (OUTPATIENT)
Dept: FAMILY MEDICINE CLINIC | Age: 86
End: 2021-04-15

## 2021-04-15 NOTE — TELEPHONE ENCOUNTER
I called and spoke with Makayla Heredia. She said that she is not taking the spironolactone and she denies any edema/sob/ wt gain.

## 2021-04-15 NOTE — TELEPHONE ENCOUNTER
Medication has been discontinued, pt was doing well with lasix only and she herself had stopped prior to last office visit. Can we verify with her that she has continued to hold this med and is not having any edema/sob/wt gain?  thanks

## 2021-04-16 NOTE — TELEPHONE ENCOUNTER
I called [facility] and I spoke with Jennifer Cain. I let him know that pt is no longer taking the spironolactone med. He states understanding and he said that he will take care of it on his side.

## 2021-04-21 DIAGNOSIS — I50.32 CHRONIC DIASTOLIC CONGESTIVE HEART FAILURE (HCC): ICD-10-CM

## 2021-04-21 DIAGNOSIS — R60.9 DEPENDENT EDEMA: ICD-10-CM

## 2021-04-21 DIAGNOSIS — I10 ESSENTIAL HYPERTENSION: Chronic | ICD-10-CM

## 2021-04-21 RX ORDER — SPIRONOLACTONE 25 MG/1
25 TABLET ORAL DAILY PRN
Qty: 90 TABLET | Refills: 1 | Status: CANCELLED | OUTPATIENT
Start: 2021-04-21

## 2021-04-21 RX ORDER — SPIRONOLACTONE 25 MG/1
25 TABLET ORAL DAILY PRN
Qty: 90 TABLET | Refills: 5 | OUTPATIENT
Start: 2021-04-21

## 2021-04-21 NOTE — TELEPHONE ENCOUNTER
Ana from [facility] returned my call I advised patient is no longer taking the spirolactone.  Ana stated she will contact patients care giver and advise her not to give the patient the spirolactone any longer

## 2021-04-21 NOTE — TELEPHONE ENCOUNTER
Ana called and requested a refill on patients spirolactone.  Called left message for Ana to call the office to advise the patient is no longer taking the medication

## 2021-04-28 DIAGNOSIS — K59.04 CHRONIC IDIOPATHIC CONSTIPATION: ICD-10-CM

## 2021-04-29 RX ORDER — DOCUSATE SODIUM 100 MG/1
CAPSULE, LIQUID FILLED ORAL
Qty: 60 CAPSULE | Refills: 5 | Status: SHIPPED | OUTPATIENT
Start: 2021-04-29 | Stop reason: SDUPTHER

## 2021-05-21 ENCOUNTER — TELEPHONE (OUTPATIENT)
Dept: FAMILY MEDICINE CLINIC | Age: 86
End: 2021-05-21

## 2021-05-21 ENCOUNTER — OFFICE VISIT (OUTPATIENT)
Dept: FAMILY MEDICINE CLINIC | Age: 86
End: 2021-05-21
Payer: MEDICARE

## 2021-05-21 VITALS
BODY MASS INDEX: 26.72 KG/M2 | SYSTOLIC BLOOD PRESSURE: 122 MMHG | RESPIRATION RATE: 16 BRPM | HEART RATE: 65 BPM | DIASTOLIC BLOOD PRESSURE: 62 MMHG | WEIGHT: 136.8 LBS | TEMPERATURE: 97.1 F | OXYGEN SATURATION: 98 %

## 2021-05-21 DIAGNOSIS — R41.3 MEMORY DIFFICULTIES: ICD-10-CM

## 2021-05-21 DIAGNOSIS — R07.89 CHEST WALL PAIN: Primary | ICD-10-CM

## 2021-05-21 DIAGNOSIS — R42 VERTIGO: ICD-10-CM

## 2021-05-21 PROCEDURE — 99214 OFFICE O/P EST MOD 30 MIN: CPT | Performed by: PHYSICIAN ASSISTANT

## 2021-05-21 RX ORDER — MECLIZINE HYDROCHLORIDE 25 MG/1
25 TABLET ORAL 3 TIMES DAILY PRN
Qty: 90 TABLET | Refills: 5 | Status: SHIPPED | OUTPATIENT
Start: 2021-05-21 | End: 2022-05-09 | Stop reason: SDUPTHER

## 2021-05-21 ASSESSMENT — ENCOUNTER SYMPTOMS
COUGH: 0
CHEST TIGHTNESS: 0
SHORTNESS OF BREATH: 0

## 2021-05-21 NOTE — ASSESSMENT & PLAN NOTE
UA and cx now, discussed potential for memory issues from statin, pt and family would like to hold medication at this time, all verbalize understanding of risks/benefits/side effects. Pt is safe at home per family member. Will continue to monitor and f/u as needed for further evaluation.

## 2021-05-21 NOTE — PROGRESS NOTES
Jeremy Swift  8/3/1929  80 y.o.  female    SUBJECTIVE:    Chief Complaint   Patient presents with    Fall     Pt states she had a fall and it is sore under right side of ribs.  Altered Mental Status     Pt great grandaughter showed message from family member that patient is confused. HPI   Fall-pt states she fell four days ago, caught foot on side of couch and fell forward, had walker near her but could not reach it, no head injury/no loc, was able to get herself back up by holding onto back of couch and pulling self up. Once she was able to sit on couch, she was able to get to walker and has been doing well other than having some pain right lower rib area, worse with palpation/deep breath. No cough/sob/chest pain/fever or chills. Great granddaughter with pt today, showed staff message on her phone about concern for memory issues with pt over past few months/weeks. Pt does live with elderly friend and has [employer] in several times a week, has [employer] and family checks on pt multiple times throughout day. Family member today states she does not feel pt is unsafe, just has concern for memory. Pt is alert and oriented, friendly and smiling. Appropriate with conversation but does not know month/day of week today. Does know birthday/season/president/location/address.      Vertigo-chronic, uses antivert prn        PHQ Scores 9/25/2020 8/7/2020 8/6/2019 7/10/2019 4/10/2019 1/10/2019 10/18/2018   PHQ2 Score 0 1 0 0 1 0 0   PHQ9 Score 0 1 0 0 1 0 0     Interpretation of Total Score Depression Severity: 1-4 = Minimal depression, 5-9 = Mild depression, 10-14 = Moderate depression, 15-19 = Moderately severe depression, 20-27 = Severe depression     Current Outpatient Medications on File Prior to Visit   Medication Sig Dispense Refill    docusate sodium (DOK) 100 MG capsule TAKE ONE CAPSULE TWO TIMES A DAY AS NEEDED FOR CONSTIPATION 60 capsule 5    traZODone (DESYREL) 50 MG tablet Take 1 tablet by mouth controlled off meds    Diabetic eye exam ([facility] [address] 1/28/16    no retinopathy    Dysuria 9/28/2020    Flu vaccine need 9/28/2020    Gout     Hyperlipidemia     Hypertension     Hypothyroidism        Past Surgical History:   Procedure Laterality Date    APPENDECTOMY  13 yrs ago    CARPAL TUNNEL RELEASE      bilateral    COLONOSCOPY      ENDOSCOPY, COLON, DIAGNOSTIC  7/8/13    gastritis, hiatal hernia    EYE SURGERY      both eyes    HERNIA REPAIR      umbilical    HIP SURGERY Left 08/22/2016    ORIF    HYSTERECTOMY      complete    JOINT REPLACEMENT      left knee    THYROIDECTOMY, PARTIAL      ULNAR TUNNEL RELEASE      bilateral       Social History     Socioeconomic History    Marital status:      Spouse name: None    Number of children: None    Years of education: None    Highest education level: None   Occupational History    None   Tobacco Use    Smoking status: Never Smoker    Smokeless tobacco: Never Used   Vaping Use    Vaping Use: Never used   Substance and Sexual Activity    Alcohol use: Yes     Alcohol/week: 1.0 standard drinks     Types: 1 Cans of beer per week     Comment: may be less than once weekly    Drug use: No    Sexual activity: Yes     Partners: Male     Comment: states sometimes   Other Topics Concern    None   Social History Narrative    None     Social Determinants of Health     Financial Resource Strain:     Difficulty of Paying Living Expenses:    Food Insecurity:     Worried About Running Out of Food in the Last Year:     Ran Out of Food in the Last Year:    Transportation Needs:     Lack of Transportation (Medical):      Lack of Transportation (Non-Medical):    Physical Activity:     Days of Exercise per Week:     Minutes of Exercise per Session:    Stress:     Feeling of Stress :    Social Connections:     Frequency of Communication with Friends and Family:     Frequency of Social Gatherings with Friends and Family:     Attends Buddhist Services:     Active Member of Clubs or Organizations:     Attends Club or Organization Meetings:     Marital Status:    Intimate Partner Violence:     Fear of Current or Ex-Partner:     Emotionally Abused:     Physically Abused:     Sexually Abused:        Review of Systems   Constitutional: Negative for chills and fever. Respiratory: Negative for cough, chest tightness and shortness of breath. Cardiovascular: Negative for chest pain and palpitations. Genitourinary: Negative for difficulty urinating and dysuria. Skin: Negative for rash. Psychiatric/Behavioral: Negative for agitation, decreased concentration, dysphoric mood, hallucinations and sleep disturbance. The patient is not nervous/anxious. OBJECTIVE:    /62 (Site: Right Upper Arm, Position: Sitting, Cuff Size: Medium Adult)   Pulse 65   Temp 97.1 °F (36.2 °C) (Infrared)   Resp 16   Wt 136 lb 12.8 oz (62.1 kg)   SpO2 98%   BMI 26.72 kg/m²     Physical Exam  Vitals reviewed. Constitutional:       General: She is not in acute distress. Appearance: Normal appearance. HENT:      Head: Normocephalic. Cardiovascular:      Rate and Rhythm: Normal rate and regular rhythm. Heart sounds: Normal heart sounds. Pulmonary:      Effort: Pulmonary effort is normal.      Breath sounds: Normal breath sounds. No wheezing. Chest:      Chest wall: Tenderness present. Skin:     General: Skin is warm and dry. Neurological:      Mental Status: She is alert. Comments: Oriented to person/place, some mild confusion regarding day of week and month. Otherwise, pt appropriate         /PLAN:    Problem List        Other    Vertigo    Relevant Medications    meclizine (ANTIVERT) 25 MG tablet    Memory difficulties     UA and cx now, discussed potential for memory issues from statin, pt and family would like to hold medication at this time, all verbalize understanding of risks/benefits/side effects.  Pt is safe at home per family member. Will continue to monitor and f/u as needed for further evaluation. Chest wall pain - Primary     Cough and deep breathing exercises, heat/ice prn, monitor for cough/fever/sob/diff breathing and seek medical attention asap if occurs                    No follow-ups on file.

## 2021-05-21 NOTE — TELEPHONE ENCOUNTER
Spoke with [address] and informed lovastatin was d/c.     Verbal order given to [facility] for UA and urine culture. Ginette verbalized understanding.

## 2021-05-21 NOTE — ASSESSMENT & PLAN NOTE
Cough and deep breathing exercises, heat/ice prn, monitor for cough/fever/sob/diff breathing and seek medical attention asap if occurs

## 2021-05-22 ENCOUNTER — HOSPITAL ENCOUNTER (OUTPATIENT)
Age: 86
Setting detail: SPECIMEN
Discharge: HOME | End: 2021-05-22
Payer: MEDICARE

## 2021-05-22 LAB
BACTERIA: ABNORMAL /HPF
BILIRUBIN URINE: NEGATIVE MG/DL
BLOOD, URINE: NEGATIVE
CAST TYPE: ABNORMAL /HPF
CLARITY: ABNORMAL
COLOR: YELLOW
CRYSTAL TYPE: ABNORMAL /HPF
EPITHELIAL CELLS, UA: ABNORMAL /HPF
GLUCOSE, URINE: NEGATIVE MG/DL
KETONES, URINE: NEGATIVE MG/DL
LEUKOCYTE ESTERASE, URINE: ABNORMAL
MUCUS: NEGATIVE HPF
NITRITE URINE, QUANTITATIVE: POSITIVE
PH, URINE: 6 (ref 5–8)
PROTEIN UA: NEGATIVE MG/DL
RBC URINE: ABNORMAL /HPF (ref 0–6)
SPECIFIC GRAVITY UA: 1.02 (ref 1–1.03)
UROBILINOGEN, URINE: 0.2 MG/DL (ref 0.2–1)
VOLUME, (UVOL): 12 ML (ref 10–12)
WBC UA: ABNORMAL /HPF (ref 0–5)

## 2021-05-22 PROCEDURE — 81001 URINALYSIS AUTO W/SCOPE: CPT

## 2021-05-24 RX ORDER — NITROFURANTOIN 25; 75 MG/1; MG/1
100 CAPSULE ORAL 2 TIMES DAILY
Qty: 14 CAPSULE | Refills: 0 | Status: SHIPPED | OUTPATIENT
Start: 2021-05-24 | End: 2021-05-31

## 2021-06-09 ENCOUNTER — TELEPHONE (OUTPATIENT)
Dept: FAMILY MEDICINE CLINIC | Age: 86
End: 2021-06-09

## 2021-06-09 NOTE — TELEPHONE ENCOUNTER
Home care reported that pt has +2 pitting edema bilateral from pedal up to her ankles  And does not currently have any medication to address this.

## 2021-06-10 NOTE — TELEPHONE ENCOUNTER
Called and spoke to patient advised to start back on the lasix take daily add an additional tablet in the afternoon for a few days avoid excessive salt if become sob/cough go to the ED.  Patient verbalized understanding

## 2021-06-16 ENCOUNTER — TELEPHONE (OUTPATIENT)
Dept: FAMILY MEDICINE CLINIC | Age: 86
End: 2021-06-16

## 2021-06-16 NOTE — TELEPHONE ENCOUNTER
Ana from [email or web address] called and stated the patients feet and ankles are swollen they are worse when she is in bed.  Ana would like to see if you would like to place some orders for medication or any testing please advise     Ana [phone]

## 2021-06-17 NOTE — TELEPHONE ENCOUNTER
Let [facility] know the message was addressed on 6/9/2021-same day as it was sent. We spoke to pt per documentation by JOSE. Please call [facility] nurse, have pt increase lasix to bid for next 2-3 days then get BMP. Does pt have support hose she can use? Has she decreased salt intake?

## 2021-06-17 NOTE — TELEPHONE ENCOUNTER
Did she resume the lasix? Needs to decrease salt intake, BNP and CXR if any diff breathing/sob, ER if severe.

## 2021-06-17 NOTE — TELEPHONE ENCOUNTER
Called and talked to Ana she stated the pt has always been on Lasix 40mg. And she stated that the swelling is better at night not worse. Ana called early last week regarding this issue and never heard back from anyone, this has been going on for over a week. Pt is not experiencing any difficulty breathing or SOB. Please Advise, Thank You.

## 2021-06-22 NOTE — TELEPHONE ENCOUNTER
Called Mirlande Jerry advised she has not heard anything back since the last conversation with prior MA. I apologized. I explained to Ana pcp recommendations. Ana advised she will do the following advised. She stated that she isn't for certain if pt has support hose, however she will be back there tomorrow AM, and will check to see if she has them and if so to persuade her to wear them. Ana also advised that she don't think she goes over with the salt, however she does still eat salt.

## 2021-06-25 ENCOUNTER — TELEPHONE (OUTPATIENT)
Dept: FAMILY MEDICINE CLINIC | Age: 86
End: 2021-06-25

## 2021-06-25 ENCOUNTER — HOSPITAL ENCOUNTER (OUTPATIENT)
Age: 86
Setting detail: SPECIMEN
Discharge: HOME | End: 2021-06-25
Payer: MEDICARE

## 2021-06-25 LAB
ANION GAP SERPL CALCULATED.3IONS-SCNC: 16 MMOL/L (ref 4–16)
BUN BLDV-MCNC: 25 MG/DL (ref 6–23)
CALCIUM SERPL-MCNC: 8.9 MG/DL (ref 8.3–10.6)
CHLORIDE BLD-SCNC: 96 MMOL/L (ref 99–110)
CO2: 26 MMOL/L (ref 21–32)
CREAT SERPL-MCNC: 0.9 MG/DL (ref 0.6–1.1)
GFR AFRICAN AMERICAN: >60 ML/MIN/1.73M2
GFR NON-AFRICAN AMERICAN: 59 ML/MIN/1.73M2
GLUCOSE BLD-MCNC: 156 MG/DL (ref 70–99)
POTASSIUM SERPL-SCNC: 3.5 MMOL/L (ref 3.5–5.1)
SODIUM BLD-SCNC: 138 MMOL/L (ref 135–145)

## 2021-06-25 PROCEDURE — 80048 BASIC METABOLIC PNL TOTAL CA: CPT

## 2021-06-25 NOTE — TELEPHONE ENCOUNTER
I would actually recommend UC at this time as it is difficult to determine what is going on without actually being able to visualize it.

## 2021-06-25 NOTE — TELEPHONE ENCOUNTER
Called and advised Darlin Andrade from home care to have patient to go to an UC. Darlin Andrade stated she will contact patient and have her go and have the area checked on her forearm.

## 2021-06-29 ENCOUNTER — OFFICE VISIT (OUTPATIENT)
Dept: FAMILY MEDICINE CLINIC | Age: 86
End: 2021-06-29
Payer: MEDICARE

## 2021-06-29 VITALS
OXYGEN SATURATION: 93 % | TEMPERATURE: 98 F | BODY MASS INDEX: 25.78 KG/M2 | DIASTOLIC BLOOD PRESSURE: 68 MMHG | WEIGHT: 132 LBS | RESPIRATION RATE: 18 BRPM | HEART RATE: 75 BPM | SYSTOLIC BLOOD PRESSURE: 122 MMHG

## 2021-06-29 DIAGNOSIS — I50.32 CHRONIC DIASTOLIC CONGESTIVE HEART FAILURE (HCC): Chronic | ICD-10-CM

## 2021-06-29 DIAGNOSIS — L98.9 SKIN LESION OF LEFT ARM: Primary | ICD-10-CM

## 2021-06-29 DIAGNOSIS — G45.0 VBI (VERTEBROBASILAR INSUFFICIENCY): ICD-10-CM

## 2021-06-29 DIAGNOSIS — R60.9 DEPENDENT EDEMA: ICD-10-CM

## 2021-06-29 DIAGNOSIS — I10 ESSENTIAL HYPERTENSION: Chronic | ICD-10-CM

## 2021-06-29 DIAGNOSIS — F33.41 MAJOR DEPRESSIVE DISORDER, RECURRENT EPISODE, IN PARTIAL REMISSION (HCC): Chronic | ICD-10-CM

## 2021-06-29 PROCEDURE — 1123F ACP DISCUSS/DSCN MKR DOCD: CPT | Performed by: PHYSICIAN ASSISTANT

## 2021-06-29 PROCEDURE — 1090F PRES/ABSN URINE INCON ASSESS: CPT | Performed by: PHYSICIAN ASSISTANT

## 2021-06-29 PROCEDURE — 99214 OFFICE O/P EST MOD 30 MIN: CPT | Performed by: PHYSICIAN ASSISTANT

## 2021-06-29 PROCEDURE — G8417 CALC BMI ABV UP PARAM F/U: HCPCS | Performed by: PHYSICIAN ASSISTANT

## 2021-06-29 PROCEDURE — 4040F PNEUMOC VAC/ADMIN/RCVD: CPT | Performed by: PHYSICIAN ASSISTANT

## 2021-06-29 PROCEDURE — 1036F TOBACCO NON-USER: CPT | Performed by: PHYSICIAN ASSISTANT

## 2021-06-29 PROCEDURE — G8427 DOCREV CUR MEDS BY ELIG CLIN: HCPCS | Performed by: PHYSICIAN ASSISTANT

## 2021-06-29 RX ORDER — VENLAFAXINE HYDROCHLORIDE 75 MG/1
75 CAPSULE, EXTENDED RELEASE ORAL DAILY
Qty: 30 CAPSULE | Refills: 11 | Status: SHIPPED | OUTPATIENT
Start: 2021-06-29 | End: 2022-03-22 | Stop reason: SDUPTHER

## 2021-06-29 RX ORDER — POTASSIUM CHLORIDE 750 MG/1
10 TABLET, EXTENDED RELEASE ORAL DAILY
Qty: 30 TABLET | Refills: 5 | Status: SHIPPED | OUTPATIENT
Start: 2021-06-29 | End: 2021-10-08 | Stop reason: SDUPTHER

## 2021-06-29 RX ORDER — FUROSEMIDE 40 MG/1
40 TABLET ORAL DAILY
Qty: 30 TABLET | Refills: 11 | Status: SHIPPED | OUTPATIENT
Start: 2021-06-29 | End: 2022-03-22 | Stop reason: SDUPTHER

## 2021-06-29 ASSESSMENT — ENCOUNTER SYMPTOMS
COUGH: 0
SHORTNESS OF BREATH: 0
CHEST TIGHTNESS: 0
ABDOMINAL PAIN: 0

## 2021-06-29 ASSESSMENT — PATIENT HEALTH QUESTIONNAIRE - PHQ9
SUM OF ALL RESPONSES TO PHQ QUESTIONS 1-9: 0
2. FEELING DOWN, DEPRESSED OR HOPELESS: 0
SUM OF ALL RESPONSES TO PHQ9 QUESTIONS 1 & 2: 0
SUM OF ALL RESPONSES TO PHQ QUESTIONS 1-9: 0
SUM OF ALL RESPONSES TO PHQ QUESTIONS 1-9: 0
1. LITTLE INTEREST OR PLEASURE IN DOING THINGS: 0

## 2021-06-29 NOTE — PROGRESS NOTES
Angeles Schmitt  8/3/1929  80 y.o.  female    SUBJECTIVE:    Chief Complaint   Patient presents with    Headache     x 2 days    Dizziness     x 2 days    Other     Sore on L arm. HPI   Sore left arm-pt reports large nodular lesion left forearm dorsal surface x several months. Recently becoming painful, tender to touch. Scabbing at times. Headache-pt reports aching pain across occipital area x several days. Better with tylenol, worse with neck flexion. Pt states she does spend quite a bit of time during day looking at lap while reading/etc..Pt does have HTN but bp WNL today. Edema-chronic dependent edema, using lasix daily with good improvement of swelling. Does have diastolic CHF but denies cough/sob/orthopnea or LONG. Weight is actually down 4# since OV last month. Chronic dizziness-onset approx 7-8 years ago, recurrent, worse with turning head/rapid movement/uses antivert prn. Pt states there has been no change in symptoms/severity.        PHQ Scores 6/29/2021 9/25/2020 8/7/2020 8/6/2019 7/10/2019 4/10/2019 1/10/2019   PHQ2 Score 0 0 1 0 0 1 0   PHQ9 Score 0 0 1 0 0 1 0     Interpretation of Total Score Depression Severity: 1-4 = Minimal depression, 5-9 = Mild depression, 10-14 = Moderate depression, 15-19 = Moderately severe depression, 20-27 = Severe depression     Current Outpatient Medications on File Prior to Visit   Medication Sig Dispense Refill    meclizine (ANTIVERT) 25 MG tablet Take 1 tablet by mouth 3 times daily as needed for Dizziness 90 tablet 5    docusate sodium (DOK) 100 MG capsule TAKE ONE CAPSULE TWO TIMES A DAY AS NEEDED FOR CONSTIPATION 60 capsule 5    traZODone (DESYREL) 50 MG tablet Take 1 tablet by mouth nightly 90 tablet 3    omeprazole (PRILOSEC) 20 MG delayed release capsule Take 1 capsule by mouth Daily 90 capsule 3    montelukast (SINGULAIR) 10 MG tablet Take 1 tablet by mouth nightly 90 tablet 3    UNABLE TO FIND rollator walker with seat and brakes 1 Units 0    allopurinol (ZYLOPRIM) 100 MG tablet TAKE 1 TABLET BY MOUTH DAILY 90 tablet 3    levothyroxine (SYNTHROID) 100 MCG tablet TAKE 1 TABLET BY MOUTH IN THE MORNING ON AN EMPTY STOMACH. 30 tablet 11    albuterol sulfate HFA (VENTOLIN HFA) 108 (90 Base) MCG/ACT inhaler Inhale 2 puffs into the lungs every 6 hours as needed for Wheezing 3 Inhaler 3    olopatadine (PATADAY) 0.2 % SOLN ophthalmic solution   5    UNABLE TO FIND Provide one electronic blood pressure cuff for home monitoring 1 each 0    acetaminophen (TYLENOL) 650 MG CR tablet Take 1 tablet by mouth every 8 hours as needed for Pain 90 tablet 3    Misc. Devices (TUB TRANSFER BOARD) MISC Use as directed 1 each 0    famotidine (PEPCID) 40 MG tablet TAKE 1 TABLET BY MOUTH NIGHTLY 90 tablet 3    Blood Glucose Monitoring Suppl (BookBag BLOOD GLUCOSE MONITOR) STEWART Use as directed for daily blood sugar testing 2 Device 3    blood glucose test strips (Where I've BeenACE BLOOD GLUCOSE TEST) strip 1 each by In Vitro route daily As needed. 100 each 3    aspirin EC 81 MG EC tablet Take 1 tablet by mouth daily. (Patient not taking: Reported on 6/29/2021) 30 tablet 5     No current facility-administered medications on file prior to visit.        Allergies   Allergen Reactions    Codeine Other (See Comments)     Gets \"jittery feeling\"       Past Medical History:   Diagnosis Date    ACP (advance care planning) 8/10/2020    Allergic rhinitis     Anxiety     Arthritis     Bradycardia     Cellulitis, leg 1/8/2012    Depression     Diabetes mellitus ([address]     sugars controlled off meds    Diabetic eye exam ([facility] [address] 1/28/16    no retinopathy    Dysuria 9/28/2020    Flu vaccine need 9/28/2020    Gout     Hyperlipidemia     Hypertension     Hypothyroidism        Past Surgical History:   Procedure Laterality Date    APPENDECTOMY  13 yrs ago    CARPAL TUNNEL RELEASE      bilateral    COLONOSCOPY      ENDOSCOPY, COLON, DIAGNOSTIC  7/8/13    gastritis, hiatal hernia    EYE SURGERY      both eyes    HERNIA REPAIR      umbilical    HIP SURGERY Left 08/22/2016    ORIF    HYSTERECTOMY      complete    JOINT REPLACEMENT      left knee    THYROIDECTOMY, PARTIAL      ULNAR TUNNEL RELEASE      bilateral       Social History     Socioeconomic History    Marital status:      Spouse name: None    Number of children: None    Years of education: None    Highest education level: None   Occupational History    None   Tobacco Use    Smoking status: Never Smoker    Smokeless tobacco: Never Used   Vaping Use    Vaping Use: Never used   Substance and Sexual Activity    Alcohol use: Yes     Alcohol/week: 1.0 standard drinks     Types: 1 Cans of beer per week     Comment: may be less than once weekly    Drug use: No    Sexual activity: Yes     Partners: Male     Comment: states sometimes   Other Topics Concern    None   Social History Narrative    None     Social Determinants of Health     Financial Resource Strain:     Difficulty of Paying Living Expenses:    Food Insecurity:     Worried About Running Out of Food in the Last Year:     Ran Out of Food in the Last Year:    Transportation Needs:     Lack of Transportation (Medical):  Lack of Transportation (Non-Medical):    Physical Activity:     Days of Exercise per Week:     Minutes of Exercise per Session:    Stress:     Feeling of Stress :    Social Connections:     Frequency of Communication with Friends and Family:     Frequency of Social Gatherings with Friends and Family:     Attends L[employer] Services:     Active Member of Clubs or Organizations:     Attends Club or Organization Meetings:     Marital Status:    Intimate Partner Violence:     Fear of Current or Ex-Partner:     Emotionally Abused:     Physically Abused:     Sexually Abused:        Review of Systems   Constitutional: Negative for chills, fever and unexpected weight change. Eyes: Negative for visual disturbance. Relevant Medications    aspirin EC 81 MG EC tablet    UNABLE TO FIND    Diastolic CHF (HCC) (Chronic)     Stable, no sxs acute exacerbation noted today, f/u as scheduled          Relevant Medications    UNABLE TO FIND    furosemide (LASIX) 40 MG tablet       Musculoskeletal and Integument    Skin lesion of left arm - Primary               Relevant Medications    mupirocin (BACTROBAN) 2 % ointment    Other Relevant Orders    [facility] - Severa Plowman, MD, Dermatology, [facility]       Other    Dependent edema     Continue to avoid salt, elevate legs when able, lasix as directed          Relevant Medications    furosemide (LASIX) 40 MG tablet               No follow-ups on file.

## 2021-07-07 ENCOUNTER — TELEPHONE (OUTPATIENT)
Dept: FAMILY MEDICINE CLINIC | Age: 86
End: 2021-07-07

## 2021-07-07 NOTE — TELEPHONE ENCOUNTER
Home care wanted to report a fall last night the pt was trying to get out of bed by herself and fell no injuries and vitals were good.

## 2021-07-07 NOTE — TELEPHONE ENCOUNTER
[employer] called stating they contacted Yudy Romero today and tried to get a update with the fall pt appeared confused and took a while to get oriented said they will try to contact the daughter but, with the current information they do not feel she is safe. Informed her that at the last office visit she was alert and oriented to person, place, and time.

## 2021-07-08 ENCOUNTER — TELEPHONE (OUTPATIENT)
Dept: FAMILY MEDICINE CLINIC | Age: 86
End: 2021-07-08

## 2021-07-08 ENCOUNTER — OFFICE VISIT (OUTPATIENT)
Dept: FAMILY MEDICINE CLINIC | Age: 86
End: 2021-07-08
Payer: MEDICARE

## 2021-07-08 VITALS
HEART RATE: 67 BPM | BODY MASS INDEX: 26.05 KG/M2 | RESPIRATION RATE: 15 BRPM | WEIGHT: 133.4 LBS | DIASTOLIC BLOOD PRESSURE: 70 MMHG | TEMPERATURE: 97.1 F | SYSTOLIC BLOOD PRESSURE: 122 MMHG | OXYGEN SATURATION: 99 %
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
## 2021-07-08 DIAGNOSIS — Z13.31 POSITIVE DEPRESSION SCREENING: ICD-10-CM

## 2021-07-08 DIAGNOSIS — R40.4 TRANSIENT ALTERATION OF AWARENESS: Primary | ICD-10-CM

## 2021-07-08 DIAGNOSIS — N30.00 ACUTE CYSTITIS WITHOUT HEMATURIA: ICD-10-CM

## 2021-07-08 LAB
BILIRUBIN, POC: NEGATIVE
BLOOD URINE, POC: NEGATIVE
CLARITY, POC: ABNORMAL
COLOR, POC: ABNORMAL
GLUCOSE URINE, POC: NEGATIVE
KETONES, POC: NEGATIVE
LEUKOCYTE EST, POC: ABNORMAL
NITRITE, POC: POSITIVE
PH, POC: 5.5
PROTEIN, POC: NEGATIVE
SPECIFIC GRAVITY, POC: 1.01
UROBILINOGEN, POC: 0.2

## 2021-07-08 PROCEDURE — 99214 OFFICE O/P EST MOD 30 MIN: CPT | Performed by: PHYSICIAN ASSISTANT

## 2021-07-08 PROCEDURE — 1090F PRES/ABSN URINE INCON ASSESS: CPT | Performed by: PHYSICIAN ASSISTANT

## 2021-07-08 PROCEDURE — 81002 URINALYSIS NONAUTO W/O SCOPE: CPT | Performed by: PHYSICIAN ASSISTANT

## 2021-07-08 PROCEDURE — G8431 POS CLIN DEPRES SCRN F/U DOC: HCPCS | Performed by: PHYSICIAN ASSISTANT

## 2021-07-08 PROCEDURE — 4040F PNEUMOC VAC/ADMIN/RCVD: CPT | Performed by: PHYSICIAN ASSISTANT

## 2021-07-08 PROCEDURE — G8427 DOCREV CUR MEDS BY ELIG CLIN: HCPCS | Performed by: PHYSICIAN ASSISTANT

## 2021-07-08 PROCEDURE — 1123F ACP DISCUSS/DSCN MKR DOCD: CPT | Performed by: PHYSICIAN ASSISTANT

## 2021-07-08 PROCEDURE — G8417 CALC BMI ABV UP PARAM F/U: HCPCS | Performed by: PHYSICIAN ASSISTANT

## 2021-07-08 PROCEDURE — 1036F TOBACCO NON-USER: CPT | Performed by: PHYSICIAN ASSISTANT

## 2021-07-08 RX ORDER — CIPROFLOXACIN 250 MG/1
250 TABLET, FILM COATED ORAL 2 TIMES DAILY
Qty: 6 TABLET | Refills: 0 | Status: SHIPPED | OUTPATIENT
Start: 2021-07-08 | End: 2021-07-11

## 2021-07-08 SDOH — ECONOMIC STABILITY: FOOD INSECURITY: WITHIN THE PAST 12 MONTHS, YOU WORRIED THAT YOUR FOOD WOULD RUN OUT BEFORE YOU GOT MONEY TO BUY MORE.: NEVER TRUE

## 2021-07-08 SDOH — ECONOMIC STABILITY: FOOD INSECURITY: WITHIN THE PAST 12 MONTHS, THE FOOD YOU BOUGHT JUST DIDN'T LAST AND YOU DIDN'T HAVE MONEY TO GET MORE.: NEVER TRUE

## 2021-07-08 ASSESSMENT — ENCOUNTER SYMPTOMS
ABDOMINAL PAIN: 0
COUGH: 0
SHORTNESS OF BREATH: 0

## 2021-07-08 ASSESSMENT — COLUMBIA-SUICIDE SEVERITY RATING SCALE - C-SSRS
1. WITHIN THE PAST MONTH, HAVE YOU WISHED YOU WERE DEAD OR WISHED YOU COULD GO TO SLEEP AND NOT WAKE UP?: NO
2. HAVE YOU ACTUALLY HAD ANY THOUGHTS OF KILLING YOURSELF?: NO
6. HAVE YOU EVER DONE ANYTHING, STARTED TO DO ANYTHING, OR PREPARED TO DO ANYTHING TO END YOUR LIFE?: NO

## 2021-07-08 ASSESSMENT — PATIENT HEALTH QUESTIONNAIRE - PHQ9
8. MOVING OR SPEAKING SO SLOWLY THAT OTHER PEOPLE COULD HAVE NOTICED. OR THE OPPOSITE, BEING SO FIGETY OR RESTLESS THAT YOU HAVE BEEN MOVING AROUND A LOT MORE THAN USUAL: 1
9. THOUGHTS THAT YOU WOULD BE BETTER OFF DEAD, OR OF HURTING YOURSELF: 0
4. FEELING TIRED OR HAVING LITTLE ENERGY: 0
5. POOR APPETITE OR OVEREATING: 0
SUM OF ALL RESPONSES TO PHQ QUESTIONS 1-9: 10
SUM OF ALL RESPONSES TO PHQ9 QUESTIONS 1 & 2: 5
SUM OF ALL RESPONSES TO PHQ QUESTIONS 1-9: 10
2. FEELING DOWN, DEPRESSED OR HOPELESS: 3
SUM OF ALL RESPONSES TO PHQ QUESTIONS 1-9: 10
7. TROUBLE CONCENTRATING ON THINGS, SUCH AS READING THE NEWSPAPER OR WATCHING TELEVISION: 0
3. TROUBLE FALLING OR STAYING ASLEEP: 3
1. LITTLE INTEREST OR PLEASURE IN DOING THINGS: 2
6. FEELING BAD ABOUT YOURSELF - OR THAT YOU ARE A FAILURE OR HAVE LET YOURSELF OR YOUR FAMILY DOWN: 1
10. IF YOU CHECKED OFF ANY PROBLEMS, HOW DIFFICULT HAVE THESE PROBLEMS MADE IT FOR YOU TO DO YOUR WORK, TAKE CARE OF THINGS AT HOME, OR GET ALONG WITH OTHER PEOPLE: 0

## 2021-07-08 ASSESSMENT — SOCIAL DETERMINANTS OF HEALTH (SDOH): HOW HARD IS IT FOR YOU TO PAY FOR THE VERY BASICS LIKE FOOD, HOUSING, MEDICAL CARE, AND HEATING?: NOT HARD AT ALL

## 2021-07-08 NOTE — PROGRESS NOTES
Asher Castillo  8/3/1929  80 y.o.  female    SUBJECTIVE:    Chief Complaint   Patient presents with    Other     Patient presents today for a check on her mental status. According to home health nurse- patient fell out of bed tuesday or wednesday night. When she called to follow up with patient told home health nurse she was not available. Patient says she did not hit her head and no LOC after fall. HPI   Pt here today for evaluation. Johanne nurse called yesterday to talk to her and pt told the nurse that Reinier Amezquita was not available. During course of conversation, pt told nurse that she had fallen out of bed the night prior and nurse became concerned for acute confusion/head injury. Johanne RN had called office yesterday wanting pt evaluated at that time. Pt here today with daughter. Appears alert/oriented. States that she did get her feet tangled in blanket during the night and slipped out of the bed. Pt states she basically \"just slid right out\". No head injury or LOC, was able to get feet  Untangled and then get self back up into bed without incident. Pt did have UTI at end of May but was not able to get culture and was to f/u if symptoms continued but states she has not been having any acute symptoms. Positive depression screening- pt states she is \"up and down\" but mostly up. Does not feel she is depressed at this time, \"just down\". Does not want medication at this time.      PHQ Scores 7/8/2021 6/29/2021 9/25/2020 8/7/2020 8/6/2019 7/10/2019 4/10/2019   PHQ2 Score 5 0 0 1 0 0 1   PHQ9 Score 10 0 0 1 0 0 1     Interpretation of Total Score Depression Severity: 1-4 = Minimal depression, 5-9 = Mild depression, 10-14 = Moderate depression, 15-19 = Moderately severe depression, 20-27 = Severe depression     Current Outpatient Medications on File Prior to Visit   Medication Sig Dispense Refill    venlafaxine (EFFEXOR XR) 75 MG extended release capsule TAKE 1 CAPSULE BY MOUTH DAILY 30 capsule 11    furosemide (LASIX) 40 MG tablet TAKE 1 TABLET BY MOUTH DAILY 30 tablet 11    potassium chloride (KLOR-CON M) 10 MEQ extended release tablet Take 1 tablet by mouth daily 30 tablet 5    meclizine (ANTIVERT) 25 MG tablet Take 1 tablet by mouth 3 times daily as needed for Dizziness 90 tablet 5    docusate sodium (DOK) 100 MG capsule TAKE ONE CAPSULE TWO TIMES A DAY AS NEEDED FOR CONSTIPATION 60 capsule 5    traZODone (DESYREL) 50 MG tablet Take 1 tablet by mouth nightly 90 tablet 3    omeprazole (PRILOSEC) 20 MG delayed release capsule Take 1 capsule by mouth Daily 90 capsule 3    montelukast (SINGULAIR) 10 MG tablet Take 1 tablet by mouth nightly 90 tablet 3    UNABLE TO FIND rollator walker with seat and brakes 1 Units 0    allopurinol (ZYLOPRIM) 100 MG tablet TAKE 1 TABLET BY MOUTH DAILY 90 tablet 3    famotidine (PEPCID) 40 MG tablet TAKE 1 TABLET BY MOUTH NIGHTLY 90 tablet 3    levothyroxine (SYNTHROID) 100 MCG tablet TAKE 1 TABLET BY MOUTH IN THE MORNING ON AN EMPTY STOMACH. 30 tablet 11    albuterol sulfate HFA (VENTOLIN HFA) 108 (90 Base) MCG/ACT inhaler Inhale 2 puffs into the lungs every 6 hours as needed for Wheezing 3 Inhaler 3    olopatadine (PATADAY) 0.2 % SOLN ophthalmic solution   5    UNABLE TO FIND Provide one electronic blood pressure cuff for home monitoring 1 each 0    Blood Glucose Monitoring Suppl (EMBRACE BLOOD GLUCOSE MONITOR) STEWART Use as directed for daily blood sugar testing 2 Device 3    blood glucose test strips (EMBRACE BLOOD GLUCOSE TEST) strip 1 each by In Vitro route daily As needed. 100 each 3    acetaminophen (TYLENOL) 650 MG CR tablet Take 1 tablet by mouth every 8 hours as needed for Pain 90 tablet 3    Misc. Devices (TUB TRANSFER BOARD) MISC Use as directed 1 each 0    aspirin EC 81 MG EC tablet Take 1 tablet by mouth daily. (Patient not taking: Reported on 6/29/2021) 30 tablet 5     No current facility-administered medications on file prior to visit. Allergies   Allergen Reactions    Codeine Other (See Comments)     Gets \"jittery feeling\"       Past Medical History:   Diagnosis Date    ACP (advance care planning) 8/10/2020    Allergic rhinitis     Anxiety     Arthritis     Bradycardia     Cellulitis, leg 1/8/2012    Depression     Diabetes mellitus ([facility] [address]     sugars controlled off meds    Diabetic eye exam ([facility] [address] 1/28/16    no retinopathy    Dysuria 9/28/2020    Flu vaccine need 9/28/2020    Gout     Hyperlipidemia     Hypertension     Hypothyroidism        Past Surgical History:   Procedure Laterality Date    APPENDECTOMY  13 yrs ago    CARPAL TUNNEL RELEASE      bilateral    COLONOSCOPY      ENDOSCOPY, COLON, DIAGNOSTIC  7/8/13    gastritis, hiatal hernia    EYE SURGERY      both eyes    HERNIA REPAIR      umbilical    HIP SURGERY Left 08/22/2016    ORIF    HYSTERECTOMY      complete    JOINT REPLACEMENT      left knee    THYROIDECTOMY, PARTIAL      ULNAR TUNNEL RELEASE      bilateral       Social History     Socioeconomic History    Marital status:      Spouse name: None    Number of children: None    Years of education: None    Highest education level: None   Occupational History    None   Tobacco Use    Smoking status: Never Smoker    Smokeless tobacco: Never Used   Vaping Use    Vaping Use: Never used   Substance and Sexual Activity    Alcohol use:  Yes     Alcohol/week: 1.0 standard drinks     Types: 1 Cans of beer per week     Comment: may be less than once weekly    Drug use: No    Sexual activity: Yes     Partners: Male     Comment: states sometimes   Other Topics Concern    None   Social History Narrative    None     Social Determinants of Health     Financial Resource Strain: Low Risk     Difficulty of Paying Living Expenses: Not hard at all   Food Insecurity: No Food Insecurity    Worried About Running Out of Food in the Last Year: Never true    Shena of Food in the Last Year: Never true Transportation Needs:     Lack of Transportation (Medical):  Lack of Transportation (Non-Medical):    Physical Activity:     Days of Exercise per Week:     Minutes of Exercise per Session:    Stress:     Feeling of Stress :    Social Connections:     Frequency of Communication with Friends and Family:     Frequency of Social Gatherings with Friends and Family:     Attends Temple Services:     Active Member of Clubs or Organizations:     Attends Club or Organization Meetings:     Marital Status:    Intimate Partner Violence:     Fear of Current or Ex-Partner:     Emotionally Abused:     Physically Abused:     Sexually Abused:        Review of Systems   Constitutional: Negative for chills and fever. Respiratory: Negative for cough and shortness of breath. Cardiovascular: Negative for chest pain. Gastrointestinal: Negative for abdominal pain. Genitourinary: Negative for dysuria, frequency and urgency. Musculoskeletal: Positive for arthralgias. Neurological: Positive for light-headedness. Negative for dizziness, tremors, syncope, speech difficulty and headaches. Chronic dizziness   Psychiatric/Behavioral: Positive for dysphoric mood. Negative for agitation, confusion, self-injury, sleep disturbance and suicidal ideas. The patient is not nervous/anxious. OBJECTIVE:    /70 (Site: Right Upper Arm, Position: Sitting, Cuff Size: Medium Adult)   Pulse 67   Temp 97.1 °F (36.2 °C) (Temporal)   Resp 15   Wt 133 lb 6.4 oz (60.5 kg)   SpO2 99%   BMI 26.05 kg/m²     Physical Exam  Vitals reviewed. Constitutional:       General: She is not in acute distress. Appearance: Normal appearance. HENT:      Head: Normocephalic. Eyes:      Extraocular Movements: Extraocular movements intact. Cardiovascular:      Rate and Rhythm: Normal rate and regular rhythm. Heart sounds: Normal heart sounds.    Pulmonary:      Effort: Pulmonary effort is normal.      Breath sounds: Normal breath sounds. Abdominal:      General: Bowel sounds are normal.      Palpations: Abdomen is soft. Tenderness: There is no abdominal tenderness. Musculoskeletal:         General: Normal range of motion. Cervical back: Normal range of motion and neck supple. Skin:     General: Skin is warm and dry. Neurological:      General: No focal deficit present. Mental Status: She is alert and oriented to person, place, and time. Mental status is at baseline. Psychiatric:         Mood and Affect: Mood normal.         Flushing Heart:    Problem List        Genitourinary    Acute cystitis without hematuria     UA and cx now, start cipro, push fluids,              Other    Transient alteration of awareness - Primary     Most likely due to underlying UTI, did discuss CT of head due to recent fall, pt and daughter would like to hold for now after shared decision making utilized. Daughter will monitor for increased confusion/headache/photophobia (all of which are negative at this time)          Relevant Orders    POCT Urinalysis no Micro (Completed)    Culture, Urine    Positive depression screening     Pt declines need for medication at this time, Denies SI/HI/AVH            Relevant Orders    Positive Screen for Clinical Depression with a Documented Follow-up Plan  (Completed)    Fall     Will contact KRISTINA Gorman for PT eval                     Return in about 2 months (around 9/8/2021).

## 2021-07-08 NOTE — TELEPHONE ENCOUNTER
Pt seen today, positive UTI, starting tx.  Appeared alert/oriented today and no concern for safety noted today

## 2021-07-08 NOTE — ASSESSMENT & PLAN NOTE
Most likely due to underlying UTI, did discuss CT of head due to recent fall, pt and daughter would like to hold for now after shared decision making utilized.  Daughter will monitor for increased confusion/headache/photophobia (all of which are negative at this time)

## 2021-07-10 LAB
ORGANISM: ABNORMAL
URINE CULTURE, ROUTINE: ABNORMAL

## 2021-07-19 ENCOUNTER — TELEPHONE (OUTPATIENT)
Dept: FAMILY MEDICINE CLINIC | Age: 86
End: 2021-07-19
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
## 2021-07-19 DIAGNOSIS — R40.4 ALTERED AWARENESS, TRANSIENT: ICD-10-CM

## 2021-07-19 NOTE — TELEPHONE ENCOUNTER
Patients daughter called and stated her mother has fallen again and had discussed having a CT Scan at her last visit would like to see if she could get an order to get the CT please advise

## 2021-07-20 ENCOUNTER — TELEPHONE (OUTPATIENT)
Dept: FAMILY MEDICINE CLINIC | Age: 86
End: 2021-07-20

## 2021-07-20 NOTE — TELEPHONE ENCOUNTER
Madeleine with [id number] to notify our office of a fall the patient had over the weekend[facility] a [facility]-no other significant injuries-patient did call the squad and they helped her up and checked over her-they did not feel that patient needed a ER

## 2021-07-20 NOTE — TELEPHONE ENCOUNTER
Called and spoke to patients daughter Joyceann Lanes advised CT scan has been placed gave central scheduling phone to number to schedule the CT.  Advised she can buy the compression stocking over the counter daughter verbalized understanding

## 2021-07-23 ENCOUNTER — HOSPITAL ENCOUNTER (OUTPATIENT)
Dept: CT IMAGING | Age: 86
Discharge: HOME | End: 2021-07-23
Payer: MEDICARE
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
## 2021-07-23 DIAGNOSIS — R40.4 ALTERED AWARENESS, TRANSIENT: ICD-10-CM

## 2021-07-23 PROCEDURE — 70450 CT HEAD/BRAIN W/O DYE: CPT

## 2021-07-28 ENCOUNTER — TELEPHONE (OUTPATIENT)
Dept: FAMILY MEDICINE CLINIC | Age: 86
End: 2021-07-28

## 2021-07-28 NOTE — TELEPHONE ENCOUNTER
Eloise Conrad called from [facility] for verbal permission for a weekly visit to set up patients medication, verbal okay given

## 2021-08-26 ENCOUNTER — HOSPITAL ENCOUNTER (EMERGENCY)
Age: 86
Discharge: LEFT BEFORE DISCHARGE | End: 2021-08-26
Attending: EMERGENCY MEDICINE
Payer: MEDICARE

## 2021-08-26 ENCOUNTER — APPOINTMENT (OUTPATIENT)
Dept: GENERAL RADIOLOGY | Age: 86
End: 2021-08-26
Payer: MEDICARE

## 2021-08-26 VITALS
HEIGHT: 64 IN | HEART RATE: 83 BPM | SYSTOLIC BLOOD PRESSURE: 152 MMHG | RESPIRATION RATE: 16 BRPM | DIASTOLIC BLOOD PRESSURE: 114 MMHG | BODY MASS INDEX: 21.17 KG/M2 | WEIGHT: 124 LBS | OXYGEN SATURATION: 99 % | TEMPERATURE: 98.7 F

## 2021-08-26 DIAGNOSIS — R07.9 CHEST PAIN, UNSPECIFIED TYPE: Primary | ICD-10-CM

## 2021-08-26 LAB
ALBUMIN SERPL-MCNC: 4.4 GM/DL (ref 3.4–5)
ALP BLD-CCNC: 91 IU/L (ref 40–129)
ALT SERPL-CCNC: 18 U/L (ref 10–40)
ANION GAP SERPL CALCULATED.3IONS-SCNC: 13 MMOL/L (ref 4–16)
AST SERPL-CCNC: 25 IU/L (ref 15–37)
BASOPHILS ABSOLUTE: 0 K/CU MM
BASOPHILS RELATIVE PERCENT: 0.7 % (ref 0–1)
BILIRUB SERPL-MCNC: 0.4 MG/DL (ref 0–1)
BUN BLDV-MCNC: 16 MG/DL (ref 6–23)
CALCIUM SERPL-MCNC: 9.7 MG/DL (ref 8.3–10.6)
CHLORIDE BLD-SCNC: 102 MMOL/L (ref 99–110)
CO2: 25 MMOL/L (ref 21–32)
CREAT SERPL-MCNC: 1 MG/DL (ref 0.6–1.1)
DIFFERENTIAL TYPE: ABNORMAL
EKG ATRIAL RATE: 68 BPM
EKG DIAGNOSIS: NORMAL
EKG P-R INTERVAL: 158 MS
EKG Q-T INTERVAL: 418 MS
EKG QRS DURATION: 102 MS
EKG QTC CALCULATION (BAZETT): 444 MS
EKG R AXIS: -45 DEGREES
EKG T AXIS: 3 DEGREES
EKG VENTRICULAR RATE: 68 BPM
EOSINOPHILS ABSOLUTE: 0.2 K/CU MM
EOSINOPHILS RELATIVE PERCENT: 3.5 % (ref 0–3)
GFR AFRICAN AMERICAN: >60 ML/MIN/1.73M2
GFR NON-AFRICAN AMERICAN: 52 ML/MIN/1.73M2
GLUCOSE BLD-MCNC: 113 MG/DL (ref 70–99)
HCT VFR BLD CALC: 36.9 % (ref 37–47)
HEMOGLOBIN: 12.1 GM/DL (ref 12.5–16)
IMMATURE NEUTROPHIL %: 0.2 % (ref 0–0.43)
LYMPHOCYTES ABSOLUTE: 1.1 K/CU MM
LYMPHOCYTES RELATIVE PERCENT: 18.4 % (ref 24–44)
MCH RBC QN AUTO: 29.4 PG (ref 27–31)
MCHC RBC AUTO-ENTMCNC: 32.8 % (ref 32–36)
MCV RBC AUTO: 89.8 FL (ref 78–100)
MONOCYTES ABSOLUTE: 0.4 K/CU MM
MONOCYTES RELATIVE PERCENT: 6.1 % (ref 0–4)
PDW BLD-RTO: 12.9 % (ref 11.7–14.9)
PLATELET # BLD: 146 K/CU MM (ref 140–440)
PMV BLD AUTO: 10.6 FL (ref 7.5–11.1)
POTASSIUM SERPL-SCNC: 4.3 MMOL/L (ref 3.5–5.1)
PRO-BNP: 536.7 PG/ML
RBC # BLD: 4.11 M/CU MM (ref 4.2–5.4)
SEGMENTED NEUTROPHILS ABSOLUTE COUNT: 4.3 K/CU MM
SEGMENTED NEUTROPHILS RELATIVE PERCENT: 71.1 % (ref 36–66)
SODIUM BLD-SCNC: 140 MMOL/L (ref 135–145)
TOTAL IMMATURE NEUTOROPHIL: 0.01 K/CU MM
TOTAL PROTEIN: 6.4 GM/DL (ref 6.4–8.2)
TROPONIN T: <0.01 NG/ML
WBC # BLD: 6 K/CU MM (ref 4–10.5)

## 2021-08-26 PROCEDURE — G0378 HOSPITAL OBSERVATION PER HR: HCPCS

## 2021-08-26 PROCEDURE — 93010 ELECTROCARDIOGRAM REPORT: CPT | Performed by: INTERNAL MEDICINE

## 2021-08-26 PROCEDURE — 99284 EMERGENCY DEPT VISIT MOD MDM: CPT

## 2021-08-26 PROCEDURE — 93005 ELECTROCARDIOGRAM TRACING: CPT | Performed by: EMERGENCY MEDICINE

## 2021-08-26 PROCEDURE — 80053 COMPREHEN METABOLIC PANEL: CPT

## 2021-08-26 PROCEDURE — 84484 ASSAY OF TROPONIN QUANT: CPT

## 2021-08-26 PROCEDURE — 85025 COMPLETE CBC W/AUTO DIFF WBC: CPT

## 2021-08-26 PROCEDURE — 83880 ASSAY OF NATRIURETIC PEPTIDE: CPT

## 2021-08-26 PROCEDURE — 71045 X-RAY EXAM CHEST 1 VIEW: CPT

## 2021-08-26 RX ORDER — SODIUM CHLORIDE 9 MG/ML
250 INJECTION, SOLUTION INTRAVENOUS PRN
Status: DISCONTINUED | OUTPATIENT
Start: 2021-08-26 | End: 2021-08-26 | Stop reason: HOSPADM

## 2021-08-26 RX ORDER — ONDANSETRON 2 MG/ML
4 INJECTION INTRAMUSCULAR; INTRAVENOUS EVERY 6 HOURS PRN
Status: DISCONTINUED | OUTPATIENT
Start: 2021-08-26 | End: 2021-08-26 | Stop reason: HOSPADM

## 2021-08-26 RX ORDER — ATORVASTATIN CALCIUM 40 MG/1
40 TABLET, FILM COATED ORAL NIGHTLY
Status: DISCONTINUED | OUTPATIENT
Start: 2021-08-26 | End: 2021-08-26 | Stop reason: HOSPADM

## 2021-08-26 RX ORDER — ONDANSETRON 4 MG/1
4 TABLET, ORALLY DISINTEGRATING ORAL EVERY 8 HOURS PRN
Status: DISCONTINUED | OUTPATIENT
Start: 2021-08-26 | End: 2021-08-26 | Stop reason: HOSPADM

## 2021-08-26 RX ORDER — SODIUM CHLORIDE 0.9 % (FLUSH) 0.9 %
5-40 SYRINGE (ML) INJECTION EVERY 12 HOURS SCHEDULED
Status: DISCONTINUED | OUTPATIENT
Start: 2021-08-26 | End: 2021-08-26 | Stop reason: HOSPADM

## 2021-08-26 RX ORDER — POLYETHYLENE GLYCOL 3350 17 G/17G
17 POWDER, FOR SOLUTION ORAL DAILY PRN
Status: DISCONTINUED | OUTPATIENT
Start: 2021-08-26 | End: 2021-08-26 | Stop reason: HOSPADM

## 2021-08-26 RX ORDER — ASPIRIN 81 MG/1
81 TABLET, CHEWABLE ORAL DAILY
Status: DISCONTINUED | OUTPATIENT
Start: 2021-08-27 | End: 2021-08-26 | Stop reason: HOSPADM

## 2021-08-26 RX ORDER — ACETAMINOPHEN 325 MG/1
650 TABLET ORAL EVERY 6 HOURS PRN
Status: DISCONTINUED | OUTPATIENT
Start: 2021-08-26 | End: 2021-08-26 | Stop reason: HOSPADM

## 2021-08-26 RX ORDER — ACETAMINOPHEN 650 MG/1
650 SUPPOSITORY RECTAL EVERY 6 HOURS PRN
Status: DISCONTINUED | OUTPATIENT
Start: 2021-08-26 | End: 2021-08-26 | Stop reason: HOSPADM

## 2021-08-26 RX ORDER — SODIUM CHLORIDE 0.9 % (FLUSH) 0.9 %
5-40 SYRINGE (ML) INJECTION PRN
Status: DISCONTINUED | OUTPATIENT
Start: 2021-08-26 | End: 2021-08-26 | Stop reason: HOSPADM

## 2021-08-26 ASSESSMENT — PAIN DESCRIPTION - ORIENTATION: ORIENTATION: LEFT

## 2021-08-26 ASSESSMENT — ENCOUNTER SYMPTOMS
DIARRHEA: 0
NAUSEA: 0
COUGH: 0
SHORTNESS OF BREATH: 0
RHINORRHEA: 0
SINUS PRESSURE: 0
CONSTIPATION: 0
WHEEZING: 0
ABDOMINAL PAIN: 0
VOMITING: 0
SORE THROAT: 0

## 2021-08-26 ASSESSMENT — PAIN DESCRIPTION - LOCATION: LOCATION: FINGER (COMMENT WHICH ONE)

## 2021-08-26 ASSESSMENT — PAIN DESCRIPTION - DESCRIPTORS: DESCRIPTORS: TINGLING
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
## 2021-08-26 NOTE — Clinical Note
Patient Class: Observation [104]   REQUIRED: Diagnosis: Chest pain [348970]   Estimated Length of Stay: Estimated stay of less than 2 midnights

## 2021-08-26 NOTE — ED PROVIDER NOTES
Emergency Department Encounter    Patient: Mal Nicolas  MRN: [id number]  : 8/3/1929  Date of Evaluation: 2021  ED Provider:  Monica Sam DO    Triage Chief Complaint:   Other (Possibly anxiety)    HPI:  Mal Nicolas is a 80 y.o. female with past medical history as listed below who presents with chest pain. Chest pain is left-sided, with radiation down her arm. Patient states that this started this morning when she was going about her daily activities. She states she typically has someone that aids her in the morning, including making her breakfast, however she states the aide was unable to come today. While doing her daily chores patient began having chest pain. States is a dull, intermittent ache. Denies F/C, CP, SOB, palpitations, N/V, abdominal pain, dysuria, diarrhea and constipatin. ROS:  Review of Systems   Constitutional: Negative for chills and fever. HENT: Negative for congestion, rhinorrhea, sinus pressure and sore throat. Eyes: Negative for visual disturbance. Respiratory: Negative for cough, shortness of breath and wheezing. Cardiovascular: Positive for chest pain. Negative for palpitations. Gastrointestinal: Negative for abdominal pain, constipation, diarrhea, nausea and vomiting. Genitourinary: Negative for dysuria, frequency and urgency. Musculoskeletal: Negative for arthralgias and myalgias. Skin: Negative for rash. Neurological: Negative for dizziness and syncope. Psychiatric/Behavioral: Negative for agitation, behavioral problems and confusion.          Past Medical History:   Diagnosis Date    ACP (advance care planning) 8/10/2020    Allergic rhinitis     Anxiety     Arthritis     Bradycardia     Cellulitis, leg 2012    Depression     Diabetes mellitus ([facility] [address]     sugars controlled off meds    Diabetic eye exam ([facility] [address] 16    no retinopathy    Dysuria 2020    Flu vaccine need 2020    Gout     Hyperlipidemia     Hypertension     Hypothyroidism      Past Surgical History:   Procedure Laterality Date    APPENDECTOMY  13 yrs ago    CARPAL TUNNEL RELEASE      bilateral    COLONOSCOPY      ENDOSCOPY, COLON, DIAGNOSTIC  7/8/13    gastritis, hiatal hernia    EYE SURGERY      both eyes    HERNIA REPAIR      umbilical    HIP SURGERY Left 08/22/2016    ORIF    HYSTERECTOMY      complete    JOINT REPLACEMENT      left knee    THYROIDECTOMY, PARTIAL      ULNAR TUNNEL RELEASE      bilateral     Family History   Problem Relation Age of Onset    Heart Disease Father     Cancer Brother     Heart Attack Brother      Social History     Socioeconomic History    Marital status:      Spouse name: Not on file    Number of children: Not on file    Years of education: Not on file    Highest education level: Not on file   Occupational History    Not on file   Tobacco Use    Smoking status: Never Smoker    Smokeless tobacco: Never Used   Vaping Use    Vaping Use: Never used   Substance and Sexual Activity    Alcohol use: Yes     Alcohol/week: 1.0 standard drinks     Types: 1 Cans of beer per week     Comment: may be less than once weekly    Drug use: No    Sexual activity: Yes     Partners: Male     Comment: states sometimes   Other Topics Concern    Not on file   Social History Narrative    Not on file     Social Determinants of Health     Financial Resource Strain: Low Risk     Difficulty of Paying Living Expenses: Not hard at all   Food Insecurity: No Food Insecurity    Worried About Running Out of Food in the Last Year: Never true    Shena of Food in the Last Year: Never true   Transportation Needs:     Lack of Transportation (Medical):      Lack of Transportation (Non-Medical):    Physical Activity:     Days of Exercise per Week:     Minutes of Exercise per Session:    Stress:     Feeling of Stress :    Social Connections:     Frequency of Communication with Friends and Family:  Frequency of Social Gatherings with Friends and Family:     Attends Confucianist Services:     Active Member of Clubs or Organizations:     Attends Club or Organization Meetings:     Marital Status:    Intimate Partner Violence:     Fear of Current or Ex-Partner:     Emotionally Abused:     Physically Abused:     Sexually Abused:      No current facility-administered medications for this encounter. Current Outpatient Medications   Medication Sig Dispense Refill    levothyroxine (SYNTHROID) 100 MCG tablet TAKE 1 TABLET BY MOUTH IN THE MORNING ON AN EMPTY STOMACH.  90 tablet 3    venlafaxine (EFFEXOR XR) 75 MG extended release capsule TAKE 1 CAPSULE BY MOUTH DAILY 30 capsule 11    furosemide (LASIX) 40 MG tablet TAKE 1 TABLET BY MOUTH DAILY 30 tablet 11    potassium chloride (KLOR-CON M) 10 MEQ extended release tablet Take 1 tablet by mouth daily 30 tablet 5    meclizine (ANTIVERT) 25 MG tablet Take 1 tablet by mouth 3 times daily as needed for Dizziness 90 tablet 5    docusate sodium (DOK) 100 MG capsule TAKE ONE CAPSULE TWO TIMES A DAY AS NEEDED FOR CONSTIPATION 60 capsule 5    traZODone (DESYREL) 50 MG tablet Take 1 tablet by mouth nightly 90 tablet 3    omeprazole (PRILOSEC) 20 MG delayed release capsule Take 1 capsule by mouth Daily 90 capsule 3    montelukast (SINGULAIR) 10 MG tablet Take 1 tablet by mouth nightly 90 tablet 3    UNABLE TO FIND rollator walker with seat and brakes 1 Units 0    allopurinol (ZYLOPRIM) 100 MG tablet TAKE 1 TABLET BY MOUTH DAILY 90 tablet 3    famotidine (PEPCID) 40 MG tablet TAKE 1 TABLET BY MOUTH NIGHTLY 90 tablet 3    albuterol sulfate HFA (VENTOLIN HFA) 108 (90 Base) MCG/ACT inhaler Inhale 2 puffs into the lungs every 6 hours as needed for Wheezing 3 Inhaler 3    olopatadine (PATADAY) 0.2 % SOLN ophthalmic solution   5    UNABLE TO FIND Provide one electronic blood pressure cuff for home monitoring 1 each 0    Blood Glucose Monitoring Suppl (CICI BLOOD GLUCOSE MONITOR) STEWART Use as directed for daily blood sugar testing 2 Device 3    blood glucose test strips (EMBRACE BLOOD GLUCOSE TEST) strip 1 each by In Vitro route daily As needed. 100 each 3    acetaminophen (TYLENOL) 650 MG CR tablet Take 1 tablet by mouth every 8 hours as needed for Pain 90 tablet 3    Misc. Devices (TUB TRANSFER BOARD) MISC Use as directed 1 each 0    aspirin EC 81 MG EC tablet Take 1 tablet by mouth daily. (Patient not taking: Reported on 6/29/2021) 30 tablet 5     Allergies   Allergen Reactions    Codeine Other (See Comments)     Gets \"jittery feeling\"       Nursing Notes Reviewed    Physical Exam:  Triage VS:    ED Triage Vitals [08/26/21 1234]   Enc Vitals Group      BP (!) 160/84      Pulse 75      Resp 16      Temp 98.7 °F (37.1 °C)      Temp Source Oral      SpO2 100 %      Weight 124 lb (56.2 kg)      Height 5' 4\" (1.626 m)      Head Circumference       Peak Flow       Pain Score       Pain Loc       Pain Edu? Excl. in [address]? Physical Exam  Vitals and nursing note reviewed. Constitutional:       Appearance: Normal appearance. HENT:      Head: Normocephalic and atraumatic. Nose: Nose normal.      Mouth/Throat:      Mouth: Mucous membranes are moist.   Eyes:      Conjunctiva/sclera: Conjunctivae normal.   Cardiovascular:      Rate and Rhythm: Normal rate and regular rhythm. Pulses: Normal pulses. Pulmonary:      Effort: Pulmonary effort is normal.   Abdominal:      General: Abdomen is flat. Bowel sounds are normal. There is no distension. Palpations: Abdomen is soft. Tenderness: There is no abdominal tenderness. There is no guarding or rebound. Musculoskeletal:         General: Normal range of motion. Cervical back: Normal range of motion. Skin:     General: Skin is warm. Capillary Refill: Capillary refill takes less than 2 seconds. Neurological:      Mental Status: She is alert and oriented to person, place, and time.  Mental status is at baseline.    Psychiatric:         Mood and Affect: Mood normal.              I have reviewed and interpreted all of the currently available lab results from this visit (if applicable):  Results for orders placed or performed during the hospital encounter of 08/26/21   CBC Auto Differential   Result Value Ref Range    WBC 6.0 4.0 - 10.5 K/CU MM    RBC 4.11 (L) 4.2 - 5.4 M/CU MM    Hemoglobin 12.1 (L) 12.5 - 16.0 GM/DL    Hematocrit 36.9 (L) 37 - 47 %    MCV 89.8 78 - 100 FL    MCH 29.4 27 - 31 PG    MCHC 32.8 32.0 - 36.0 %    RDW 12.9 11.7 - 14.9 %    Platelets 381 014 - 570 K/CU MM    MPV 10.6 7.5 - 11.1 FL    Differential Type AUTOMATED DIFFERENTIAL     Segs Relative 71.1 (H) 36 - 66 %    Lymphocytes % 18.4 (L) 24 - 44 %    Monocytes % 6.1 (H) 0 - 4 %    Eosinophils % 3.5 (H) 0 - 3 %    Basophils % 0.7 0 - 1 %    Segs Absolute 4.3 K/CU MM    Lymphocytes Absolute 1.1 K/CU MM    Monocytes Absolute 0.4 K/CU MM    Eosinophils Absolute 0.2 K/CU MM    Basophils Absolute 0.0 K/CU MM    Immature Neutrophil % 0.2 0 - 0.43 %    Total Immature Neutrophil 0.01 K/CU MM   Comprehensive Metabolic Panel w/ Reflex to MG   Result Value Ref Range    Sodium 140 135 - 145 MMOL/L    Potassium 4.3 3.5 - 5.1 MMOL/L    Chloride 102 99 - 110 mMol/L    CO2 25 21 - 32 MMOL/L    BUN 16 6 - 23 MG/DL    CREATININE 1.0 0.6 - 1.1 MG/DL    Glucose 113 (H) 70 - 99 MG/DL    Calcium 9.7 8.3 - 10.6 MG/DL    Albumin 4.4 3.4 - 5.0 GM/DL    Total Protein 6.4 6.4 - 8.2 GM/DL    Total Bilirubin 0.4 0.0 - 1.0 MG/DL    ALT 18 10 - 40 U/L    AST 25 15 - 37 IU/L    Alkaline Phosphatase 91 40 - 129 IU/L    GFR Non- 52 (L) >60 mL/min/1.73m2    GFR African American >60 >60 mL/min/1.73m2    Anion Gap 13 4 - 16   Troponin   Result Value Ref Range    Troponin T <0.010 <0.01 NG/ML   Brain Natriuretic Peptide   Result Value Ref Range    Pro-.7 (H) <300 PG/ML   EKG 12 Lead   Result Value Ref Range    Ventricular Rate 68 BPM    Atrial Rate 68 BPM    P-R Interval 158 ms    QRS Duration 102 ms    Q-T Interval 418 ms    QTc Calculation (Bazett) 444 ms    R Axis -45 degrees    T Axis 3 degrees    Diagnosis       Normal sinus rhythm  Incomplete right bundle branch block  Left anterior fascicular block  Abnormal ECG  When compared with ECG of 22-SEP-2020 15:33,  No significant change was found        Radiographs (if obtained):    [] Radiologist's Report Reviewed:  XR CHEST PORTABLE   Final Result   No acute process. EKG (if obtained): (All EKG's are interpreted by myself in the absence of a cardiologist)  Normal sinus rhythm, rate 68, , , QTc 444, no acute ST elevation. Similar previous EKG done in 2020. Chart review shows recent radiographs:  No results found. MDM:  Patient seen and examined. Labs and imaging obtained. Labs unremarkable, including first troponin negative. Chest x-ray no acute disease. EKG similar to previous. Given patient's left-sided chest pain with radiation down her left arm, concern for high risk chest pain. Will admit patient for further evaluation and management. All labs and imaging discussed with patient she is in agreement plan of care. Case discussed with hospitalist who agrees with admission. 5:54 PM  Called to patient bedside, as patient's daughter is wanting to leave [address] Upon repeat evaluation patient's daughter is yelling at staff, and swearing at me, that she does not want any more of this \"bullshit\" she states they no longer want to wait for a bed. I did discuss with her that patient has been assigned a bed, and we are waiting for transfer to take patient to the bed. She states that she no longer wants to wait, and feels as though we are not taking care of her mother, and wishes to be discharged home.   I did explain to her that patient did have left-sided chest pain that rated down her arm, however the daughter interrupted my explanation, and stated that she no longer wanted to speak to me, and walked away. She is able to state that she feels safe taking patient home, as she has worked in the [facility] before. She understands that she is risking death and permanent disability by taking patient home. Clinical Impression:  1. Chest pain, unspecified type      Disposition referral (if applicable):  No follow-up provider specified. Disposition medications (if applicable):  New Prescriptions    No medications on file       Comment: Please note this report has been produced using speech recognition software and may contain errors related to that system including errors in grammar, punctuation, and spelling, as well as words and phrases that may be inappropriate. Efforts were made to edit the dictations.        [id number] [facility], DO  08/26/21 [address], DO  08/26/21 2544

## 2021-08-27 ENCOUNTER — OFFICE VISIT (OUTPATIENT)
Dept: FAMILY MEDICINE CLINIC | Age: 86
End: 2021-08-27
Payer: MEDICARE

## 2021-08-27 VITALS
WEIGHT: 126 LBS | BODY MASS INDEX: 21.63 KG/M2 | HEART RATE: 77 BPM | TEMPERATURE: 97.4 F | DIASTOLIC BLOOD PRESSURE: 70 MMHG | SYSTOLIC BLOOD PRESSURE: 110 MMHG | RESPIRATION RATE: 16 BRPM | OXYGEN SATURATION: 96 %

## 2021-08-27 DIAGNOSIS — Z87.440 HISTORY OF UTI: ICD-10-CM

## 2021-08-27 DIAGNOSIS — F41.1 GENERALIZED ANXIETY DISORDER: ICD-10-CM

## 2021-08-27 DIAGNOSIS — E11.9 TYPE 2 DIABETES MELLITUS WITHOUT COMPLICATION, WITHOUT LONG-TERM CURRENT USE OF INSULIN (HCC): ICD-10-CM

## 2021-08-27 LAB — HBA1C MFR BLD: 6 %

## 2021-08-27 PROCEDURE — 83036 HEMOGLOBIN GLYCOSYLATED A1C: CPT | Performed by: FAMILY MEDICINE

## 2021-08-27 PROCEDURE — 82044 UR ALBUMIN SEMIQUANTITATIVE: CPT | Performed by: FAMILY MEDICINE

## 2021-08-27 PROCEDURE — 99213 OFFICE O/P EST LOW 20 MIN: CPT | Performed by: FAMILY MEDICINE

## 2021-08-27 PROCEDURE — 81002 URINALYSIS NONAUTO W/O SCOPE: CPT | Performed by: FAMILY MEDICINE

## 2021-08-27 ASSESSMENT — PATIENT HEALTH QUESTIONNAIRE - PHQ9
SUM OF ALL RESPONSES TO PHQ QUESTIONS 1-9: 0
SUM OF ALL RESPONSES TO PHQ QUESTIONS 1-9: 0
1. LITTLE INTEREST OR PLEASURE IN DOING THINGS: 0
SUM OF ALL RESPONSES TO PHQ9 QUESTIONS 1 & 2: 0
2. FEELING DOWN, DEPRESSED OR HOPELESS: 0
SUM OF ALL RESPONSES TO PHQ QUESTIONS 1-9: 0

## 2021-08-27 NOTE — PROGRESS NOTES
[facility]  [address]  [address] [employer] [place] [address]  Dept: [phone]  Loc: [phone]        ASSESSMENT/PLAN:    1. Generalized anxiety disorder   Patient is on Effexor at home and was advised to continue taking medication as prescribed. Patient reports that she usually gets help from home health aide, however, yesterday she did not get help. Patient reports that she is back to her baseline and denies chest pain or acute symptoms    2. Type 2 diabetes mellitus without complication, without long-term current use of insulin (HCC)  -     POCT glycosylated hemoglobin (Hb A1C)  -     POCT microalbumin  3. History of UTI  -     POCT Urinalysis no Micro      Return if symptoms worsen or fail to improve. Patient's Name: Robert Russo   YOB: 1929   Age: 80 y.o. Date of service: 8/27/2021    Chief Complaint:   Chief Complaint   Patient presents with    Follow-Up from Hospital        Patient ID: Robert Russo is a 80 y.o. female. Chief Complaint   Patient presents with    Follow-Up from Hospital       HPI    Patient is a 25-year-old female who presents to the office accompanied by daughter for follow-up. Patient was seen in the ED yesterday for what seemed to be anxiety/chest pain. Patient reports that yesterday she could not open her pillbox and became anxious and called EMS. Patient was taken to the ED accompanied by her daughter-in-law. In the ED, patient's EKG, chest x-ray and troponin x1 were all within normal limits. Patient was about to be admitted for observation, however, patient reports that she left AMA because she stayed too long in the ED. Today in the office, patient denies chest pain, shortness of breath, syncope or any acute symptoms.   12 point review of systems were negative other than in the [facility]     Physical Exam  General: alert, awake, and oriented to time, place, person, and situation. Not in acute distress   Skin:  no suspicious rashes, no jaundice  Ear, nose, throat, Head: Normal external ears, pupils are equally round, EOMI, normocephalic/atraumatic  Heart: regular rate and rhythm, no audible murmurs, no audible friction rub  Lungs: clear to auscultation bilaterally, no audible crackles, no audible wheezes, no audible rhonchi   Abdomen: normal bowel sounds, soft abdomen, non-tender, no palpable masses  Extremities: +1 edema on the left ankle, warm, no cyanosis, no clubbing. Good capillary refill   Peripheral vascular: 2+ pulses symmetric (radial)  Neuro: gait normal,sensation intact and symmetric  Psych: normal affect, normal thoughts     Patient History:  Past Medical History:   Diagnosis Date    ACP (advance care planning) 8/10/2020    Allergic rhinitis     Anxiety     Arthritis     Bradycardia     Cellulitis, leg 1/8/2012    Depression     Diabetes mellitus (HCC)     sugars controlled off meds    Diabetic eye exam ([facility] [address] 1/28/16    no retinopathy    Dysuria 9/28/2020    Flu vaccine need 9/28/2020    Gout     Hyperlipidemia     Hypertension     Hypothyroidism      Past Surgical History:   Procedure Laterality Date    APPENDECTOMY  13 yrs ago    CARPAL TUNNEL RELEASE      bilateral    COLONOSCOPY      ENDOSCOPY, COLON, DIAGNOSTIC  7/8/13    gastritis, hiatal hernia    EYE SURGERY      both eyes    HERNIA REPAIR      umbilical    HIP SURGERY Left 08/22/2016    ORIF    HYSTERECTOMY      complete    JOINT REPLACEMENT      left knee    THYROIDECTOMY, PARTIAL      ULNAR TUNNEL RELEASE      bilateral     Social History     Socioeconomic History    Marital status:       Spouse name: Not on file    Number of children: Not on file    Years of education: Not on file    Highest education level: Not on file   Occupational History    Not on file   Tobacco Use    Smoking status: Never Smoker    Smokeless tobacco: Never Used   Vaping Use    Vaping Use: Never used   Substance and Sexual Activity    Alcohol use: Yes     Alcohol/week: 1.0 standard drinks     Types: 1 Cans of beer per week     Comment: may be less than once weekly    Drug use: No    Sexual activity: Yes     Partners: Male     Comment: states sometimes   Other Topics Concern    Not on file   Social History Narrative    Not on file     Social Determinants of Health     Financial Resource Strain: Low Risk     Difficulty of Paying Living Expenses: Not hard at all   Food Insecurity: No Food Insecurity    Worried About Running Out of Food in the Last Year: Never true    Shena of Food in the Last Year: Never true   Transportation Needs:     Lack of Transportation (Medical):      Lack of Transportation (Non-Medical):    Physical Activity:     Days of Exercise per Week:     Minutes of Exercise per Session:    Stress:     Feeling of Stress :    Social Connections:     Frequency of Communication with Friends and Family:     Frequency of Social Gatherings with Friends and Family:     Attends Yarsanism Services:     Active Member of Clubs or Organizations:     Attends Club or Organization Meetings:     Marital Status:    Intimate Partner Violence:     Fear of Current or Ex-Partner:     Emotionally Abused:     Physically Abused:     Sexually Abused:      Immunization History   Administered Date(s) Administered    COVID-19, Moderna, PF, 100mcg/0.5mL 03/26/2021, 04/23/2021    Influenza 09/17/2012, 10/08/2013    Influenza Vaccine, unspecified formulation 10/03/2016    Influenza Virus Vaccine 10/08/2014, 09/25/2015    Influenza, Quadv, IM, (6 mo and older Fluzone, Flulaval, Fluarix and 3 yrs and older Afluria) 09/29/2017    Influenza, Quadv, IM, PF (6 mo and older Fluzone, Flulaval, Fluarix, and 3 yrs and older Afluria) 09/21/2018, 10/17/2019, 09/25/2020    Pneumococcal Conjugate 13-valent ([id number]) 09/25/2015    Pneumococcal Polysaccharide ([id number]) 06/05/2013    Td, unspecified formulation 01/07/2012     Allergies   Allergen Reactions    Codeine Other (See Comments)     Gets \"jittery feeling\"     Family History   Problem Relation Age of Onset    Heart Disease Father     Cancer Brother     Heart Attack Brother          Current Outpatient Medications   Medication Sig Dispense Refill    levothyroxine (SYNTHROID) 100 MCG tablet TAKE 1 TABLET BY MOUTH IN THE MORNING ON AN EMPTY STOMACH.  90 tablet 3    venlafaxine (EFFEXOR XR) 75 MG extended release capsule TAKE 1 CAPSULE BY MOUTH DAILY 30 capsule 11    furosemide (LASIX) 40 MG tablet TAKE 1 TABLET BY MOUTH DAILY 30 tablet 11    potassium chloride (KLOR-CON M) 10 MEQ extended release tablet Take 1 tablet by mouth daily 30 tablet 5    meclizine (ANTIVERT) 25 MG tablet Take 1 tablet by mouth 3 times daily as needed for Dizziness 90 tablet 5    docusate sodium (DOK) 100 MG capsule TAKE ONE CAPSULE TWO TIMES A DAY AS NEEDED FOR CONSTIPATION 60 capsule 5    traZODone (DESYREL) 50 MG tablet Take 1 tablet by mouth nightly 90 tablet 3    omeprazole (PRILOSEC) 20 MG delayed release capsule Take 1 capsule by mouth Daily 90 capsule 3    montelukast (SINGULAIR) 10 MG tablet Take 1 tablet by mouth nightly 90 tablet 3    UNABLE TO FIND rollator walker with seat and brakes 1 Units 0    allopurinol (ZYLOPRIM) 100 MG tablet TAKE 1 TABLET BY MOUTH DAILY 90 tablet 3    famotidine (PEPCID) 40 MG tablet TAKE 1 TABLET BY MOUTH NIGHTLY 90 tablet 3    albuterol sulfate HFA (VENTOLIN HFA) 108 (90 Base) MCG/ACT inhaler Inhale 2 puffs into the lungs every 6 hours as needed for Wheezing 3 Inhaler 3    olopatadine (PATADAY) 0.2 % SOLN ophthalmic solution   5    UNABLE TO FIND Provide one electronic blood pressure cuff for home monitoring 1 each 0    Blood Glucose Monitoring Suppl ([employer] BLOOD GLUCOSE MONITOR) STEWART Use as directed for daily blood sugar testing 2 Device 3    blood glucose test strips (EMBRACE BLOOD GLUCOSE TEST) strip 1 each by In Vitro route daily As needed. 100 each 3    acetaminophen (TYLENOL) 650 MG CR tablet Take 1 tablet by mouth every 8 hours as needed for Pain 90 tablet 3    Misc. Devices (TUB TRANSFER BOARD) MISC Use as directed 1 each 0    aspirin EC 81 MG EC tablet Take 1 tablet by mouth daily. (Patient not taking: Reported on 6/29/2021) 30 tablet 5     No current facility-administered medications for this visit. Objective:  Vitals:  Vitals:    08/27/21 1048   BP: 110/70   Pulse: 77   Resp: 16   Temp: 97.4 °F (36.3 °C)   SpO2: 96%      BP Readings from Last 4 Encounters:   08/27/21 110/70   08/26/21 (!) 152/114   07/08/21 122/70   06/29/21 122/68      Body mass index is 21.63 kg/m². All questions answered to patient's satisfaction. The patient was counseled regarding impressions, instructions for management and importance of compliance with treatment. Return if symptoms worsen or fail to improve.     Nkechi Liu MD

## 2021-09-21 ENCOUNTER — TELEPHONE (OUTPATIENT)
Dept: FAMILY MEDICINE CLINIC | Age: 86
End: 2021-09-21

## 2021-09-21 NOTE — TELEPHONE ENCOUNTER
Spoke with daughter-we are to fax order to [employer]-attention Mireille Stacks number is [phone] will have to call her to get the fax number

## 2021-09-21 NOTE — TELEPHONE ENCOUNTER
----- Message from Pedro Flores sent at 9/21/2021  3:25 PM EDT -----  Subject: Message to Provider    QUESTIONS  Information for Provider? Daughter would like an order for a lift chair. Daughter will take care of providing  with the script. Advised   that the script can be emailed to her at [facility]. [email or web address]  ---------------------------------------------------------------------------  --------------  CALL BACK INFO  What is the best way for the office to contact you? OK to leave message on   voicemail  Preferred Call Back Phone Number?  [phone]  ---------------------------------------------------------------------------  --------------  SCRIPT ANSWERS  undefined

## 2021-09-22 ENCOUNTER — TELEPHONE (OUTPATIENT)
Dept: FAMILY MEDICINE CLINIC | Age: 86
End: 2021-09-22

## 2021-09-22 NOTE — TELEPHONE ENCOUNTER
----- Message from [employer] sent at 9/21/2021  3:19 PM EDT -----  Subject: Appointment Request    Reason for Call: Flu Shot    QUESTIONS  Type of Appointment? Established Patient  Reason for appointment request? No appointments available during search  Additional Information for Provider? Pt wants to be scheduled for flu shot  ---------------------------------------------------------------------------  --------------  CALL BACK INFO  What is the best way for the office to contact you? OK to leave message on   voicemail  Preferred Call Back Phone Number? [phone]  ---------------------------------------------------------------------------  --------------  SCRIPT ANSWERS  Relationship to Patient? Other  Representative Name? Bandar Wells / daughter  Additional information verified (besides Name and Date of Birth)? Address  Is the Adult patient requesting a Flu Shot? Yes  Is the parent/patient requesting a Flu Shot for a child older than 6   months? No  Does the patient have a question for their provider regarding the flu   shot? No  Have you been diagnosed with, awaiting test results for, or told that you   are suspected of having COVID-19 (Coronavirus)? (If patient has tested   negative or was tested as a requirement for work, school, or travel and   not based on symptoms, answer no)? No  Within the past two weeks have you developed any of the following symptoms   (answer no if symptoms have been present longer than 2 weeks or began   more than 2 weeks ago)? Fever or Chills, Cough, Shortness of breath or   difficulty breathing, Loss of taste or smell, Sore throat, Nasal   congestion, Sneezing or runny nose, Fatigue or generalized body aches   (answer no if pain is specific to a body part e.g. back pain), Diarrhea,   Headache? No  Have you had close contact with someone with COVID-19 in the last 14 days? No  (Service Expert  click yes below to proceed with [employer] As Usual   Scheduling)?  Yes

## 2021-09-27 ENCOUNTER — TELEPHONE (OUTPATIENT)
Dept: FAMILY MEDICINE CLINIC | Age: 86
End: 2021-09-27

## 2021-09-27 NOTE — TELEPHONE ENCOUNTER
Courtney Lindquist called for verbal okay to continue patients medication reminders.  Verbal okay given

## 2021-10-06 DIAGNOSIS — K59.04 CHRONIC IDIOPATHIC CONSTIPATION: ICD-10-CM

## 2021-10-06 RX ORDER — DOCUSATE SODIUM 100 MG/1
CAPSULE, LIQUID FILLED ORAL
Qty: 60 CAPSULE | Refills: 5 | Status: SHIPPED | OUTPATIENT
Start: 2021-10-06 | End: 2022-03-22 | Stop reason: SDUPTHER

## 2021-10-07 ENCOUNTER — OFFICE VISIT (OUTPATIENT)
Dept: FAMILY MEDICINE CLINIC | Age: 86
End: 2021-10-07
Payer: MEDICARE

## 2021-10-07 ENCOUNTER — NURSE TRIAGE (OUTPATIENT)
Dept: OTHER | Facility: CLINIC | Age: 86
End: 2021-10-07

## 2021-10-07 ENCOUNTER — TELEPHONE (OUTPATIENT)
Dept: FAMILY MEDICINE CLINIC | Age: 86
End: 2021-10-07

## 2021-10-07 VITALS
OXYGEN SATURATION: 95 % | WEIGHT: 128.6 LBS | DIASTOLIC BLOOD PRESSURE: 74 MMHG | TEMPERATURE: 97.3 F | HEART RATE: 97 BPM | BODY MASS INDEX: 22.07 KG/M2 | SYSTOLIC BLOOD PRESSURE: 122 MMHG | RESPIRATION RATE: 17 BRPM

## 2021-10-07 DIAGNOSIS — N30.00 ACUTE CYSTITIS WITHOUT HEMATURIA: ICD-10-CM

## 2021-10-07 DIAGNOSIS — R41.0 CONFUSION: Primary | ICD-10-CM

## 2021-10-07 DIAGNOSIS — R41.89 COGNITIVE IMPAIRMENT: ICD-10-CM

## 2021-10-07 LAB
BILIRUBIN, POC: NEGATIVE
BLOOD URINE, POC: NEGATIVE
CLARITY, POC: NORMAL
COLOR, POC: NORMAL
GLUCOSE URINE, POC: NEGATIVE
KETONES, POC: NEGATIVE
LEUKOCYTE EST, POC: NORMAL
NITRITE, POC: POSITIVE
PH, POC: 5.5
PROTEIN, POC: NEGATIVE
SPECIFIC GRAVITY, POC: 1.01
UROBILINOGEN, POC: 0.2

## 2021-10-07 PROCEDURE — 1036F TOBACCO NON-USER: CPT | Performed by: NURSE PRACTITIONER

## 2021-10-07 PROCEDURE — G8420 CALC BMI NORM PARAMETERS: HCPCS | Performed by: NURSE PRACTITIONER

## 2021-10-07 PROCEDURE — 81002 URINALYSIS NONAUTO W/O SCOPE: CPT | Performed by: NURSE PRACTITIONER

## 2021-10-07 PROCEDURE — 1090F PRES/ABSN URINE INCON ASSESS: CPT | Performed by: NURSE PRACTITIONER

## 2021-10-07 PROCEDURE — 1123F ACP DISCUSS/DSCN MKR DOCD: CPT | Performed by: NURSE PRACTITIONER

## 2021-10-07 PROCEDURE — 4040F PNEUMOC VAC/ADMIN/RCVD: CPT | Performed by: NURSE PRACTITIONER

## 2021-10-07 PROCEDURE — 99214 OFFICE O/P EST MOD 30 MIN: CPT | Performed by: NURSE PRACTITIONER

## 2021-10-07 PROCEDURE — G8484 FLU IMMUNIZE NO ADMIN: HCPCS | Performed by: NURSE PRACTITIONER

## 2021-10-07 PROCEDURE — G8427 DOCREV CUR MEDS BY ELIG CLIN: HCPCS | Performed by: NURSE PRACTITIONER

## 2021-10-07 RX ORDER — NITROFURANTOIN 25; 75 MG/1; MG/1
100 CAPSULE ORAL 2 TIMES DAILY
Qty: 10 CAPSULE | Refills: 0 | Status: SHIPPED | OUTPATIENT
Start: 2021-10-07 | End: 2021-10-12

## 2021-10-07 ASSESSMENT — ENCOUNTER SYMPTOMS
SHORTNESS OF BREATH: 0
VOMITING: 0
CHEST TIGHTNESS: 0
COUGH: 0
WHEEZING: 0
CONSTIPATION: 0
DIARRHEA: 0
NAUSEA: 0
ABDOMINAL PAIN: 0

## 2021-10-07 ASSESSMENT — PATIENT HEALTH QUESTIONNAIRE - PHQ9
SUM OF ALL RESPONSES TO PHQ QUESTIONS 1-9: 1
1. LITTLE INTEREST OR PLEASURE IN DOING THINGS: 0
SUM OF ALL RESPONSES TO PHQ9 QUESTIONS 1 & 2: 1
2. FEELING DOWN, DEPRESSED OR HOPELESS: 1

## 2021-10-07 NOTE — PROGRESS NOTES
SUBJECTIVE:    Rajani Gonzalez  8/3/1929  80 y.o.  female      Chief Complaint   Patient presents with    Other     Patient says \" i got upset because i didnt know where i was at. \" Daughter says it has been going on for awhile     HPI     Daughter and patient present today with concerns of confusion. Patient states 'I was at some place last night I didn't know I was at' and 'new girl walked in on me'. Daughter states she was at her apartment and that a new home health aid started today after no aid for 1.5 weeks. Daughter states there are other times patient has gotten confused. At one point mailbox was overfilling and patient was confused because she didn't know why her name was on the mail. Told her daughter she was fixing something to eat for 'dog, cat and myself', but patient does not have a dog. Ongoing issues for a few months. She did have a CT since this started and due to a fall in July with no acute findings. She does not drive. Home health aid supposed to come daily--daughter is in process of hiring new company. Patient resistant to assisted living. Mini mental exam: 19/30. Discussed option of hospice as well. Allergies   Allergen Reactions    Codeine Other (See Comments)     Gets \"jittery feeling\"       Current Outpatient Medications   Medication Sig Dispense Refill    nitrofurantoin, macrocrystal-monohydrate, (MACROBID) 100 MG capsule Take 1 capsule by mouth 2 times daily for 5 days 10 capsule 0    docusate sodium (DOK) 100 MG capsule TAKE ONE CAPSULE TWO TIMES A DAY AS NEEDED FOR CONSTIPATION 60 capsule 5    UNABLE TO FIND Left chair 1 Units 0    levothyroxine (SYNTHROID) 100 MCG tablet TAKE 1 TABLET BY MOUTH IN THE MORNING ON AN EMPTY STOMACH.  90 tablet 3    venlafaxine (EFFEXOR XR) 75 MG extended release capsule TAKE 1 CAPSULE BY MOUTH DAILY 30 capsule 11    furosemide (LASIX) 40 MG tablet TAKE 1 TABLET BY MOUTH DAILY 30 tablet 11    potassium chloride (KLOR-CON M) 10 MEQ extended release tablet Take 1 tablet by mouth daily 30 tablet 5    meclizine (ANTIVERT) 25 MG tablet Take 1 tablet by mouth 3 times daily as needed for Dizziness 90 tablet 5    traZODone (DESYREL) 50 MG tablet Take 1 tablet by mouth nightly 90 tablet 3    omeprazole (PRILOSEC) 20 MG delayed release capsule Take 1 capsule by mouth Daily 90 capsule 3    montelukast (SINGULAIR) 10 MG tablet Take 1 tablet by mouth nightly 90 tablet 3    UNABLE TO FIND rollator walker with seat and brakes 1 Units 0    allopurinol (ZYLOPRIM) 100 MG tablet TAKE 1 TABLET BY MOUTH DAILY 90 tablet 3    famotidine (PEPCID) 40 MG tablet TAKE 1 TABLET BY MOUTH NIGHTLY 90 tablet 3    albuterol sulfate HFA (VENTOLIN HFA) 108 (90 Base) MCG/ACT inhaler Inhale 2 puffs into the lungs every 6 hours as needed for Wheezing 3 Inhaler 3    olopatadine (PATADAY) 0.2 % SOLN ophthalmic solution   5    UNABLE TO FIND Provide one electronic blood pressure cuff for home monitoring 1 each 0    Blood Glucose Monitoring Suppl ([employer] BLOOD GLUCOSE MONITOR) STEWART Use as directed for daily blood sugar testing 2 Device 3    blood glucose test strips (MapboxACE BLOOD GLUCOSE TEST) strip 1 each by In Vitro route daily As needed. 100 each 3    acetaminophen (TYLENOL) 650 MG CR tablet Take 1 tablet by mouth every 8 hours as needed for Pain 90 tablet 3    Misc. Devices (TUB TRANSFER BOARD) MISC Use as directed 1 each 0    aspirin EC 81 MG EC tablet Take 81 mg by mouth daily  30 tablet 5     No current facility-administered medications for this visit.           Past Medical History:   Diagnosis Date    ACP (advance care planning) 8/10/2020    Allergic rhinitis     Anxiety     Arthritis     Bradycardia     Cellulitis, leg 1/8/2012    Depression     Diabetes mellitus (HCC)     sugars controlled off meds    Diabetic eye exam ([facility] [address] 1/28/16    no retinopathy    Dysuria 9/28/2020    Flu vaccine need 9/28/2020    Gout     Hyperlipidemia     Hypertension     Hypothyroidism      Past Surgical History:   Procedure Laterality Date    APPENDECTOMY  13 yrs ago    CARPAL TUNNEL RELEASE      bilateral    COLONOSCOPY      ENDOSCOPY, COLON, DIAGNOSTIC  7/8/13    gastritis, hiatal hernia    EYE SURGERY      both eyes    HERNIA REPAIR      umbilical    HIP SURGERY Left 08/22/2016    ORIF    HYSTERECTOMY      complete    JOINT REPLACEMENT      left knee    THYROIDECTOMY, PARTIAL      ULNAR TUNNEL RELEASE      bilateral     Social History     Socioeconomic History    Marital status:      Spouse name: None    Number of children: None    Years of education: None    Highest education level: None   Occupational History    None   Tobacco Use    Smoking status: Never Smoker    Smokeless tobacco: Never Used   Vaping Use    Vaping Use: Never used   Substance and Sexual Activity    Alcohol use: Yes     Alcohol/week: 1.0 standard drinks     Types: 1 Cans of beer per week     Comment: may be less than once weekly    Drug use: No    Sexual activity: Yes     Partners: Male     Comment: states sometimes   Other Topics Concern    None   Social History Narrative    None     Social Determinants of Health     Financial Resource Strain: Low Risk     Difficulty of Paying Living Expenses: Not hard at all   Food Insecurity: No Food Insecurity    Worried About Running Out of Food in the Last Year: Never true    Shena of Food in the Last Year: Never true   Transportation Needs:     Lack of Transportation (Medical):      Lack of Transportation (Non-Medical):    Physical Activity:     Days of Exercise per Week:     Minutes of Exercise per Session:    Stress:     Feeling of Stress :    Social Connections:     Frequency of Communication with Friends and Family:     Frequency of Social Gatherings with Friends and Family:     Attends Sikhism Services:     Active Member of Clubs or Organizations:     Attends Club or Organization Meetings:     Marital Status: Intimate Partner Violence:     Fear of Current or Ex-Partner:     Emotionally Abused:     Physically Abused:     Sexually Abused:          No problem-specific Assessment & Plan notes found for this encounter. Review of Systems   Constitutional: Negative for appetite change, chills, fatigue and unexpected weight change. Respiratory: Negative for cough, chest tightness, shortness of breath and wheezing. Cardiovascular: Negative for chest pain, palpitations and leg swelling. Gastrointestinal: Negative for abdominal pain, constipation, diarrhea, nausea and vomiting. Musculoskeletal: Negative for arthralgias. Neurological: Negative for weakness, numbness and headaches. Hematological: Negative for adenopathy. Psychiatric/Behavioral: Positive for confusion. OBJECTIVE:    /74 (Site: Left Upper Arm, Position: Sitting, Cuff Size: Medium Adult)   Pulse 97   Temp 97.3 °F (36.3 °C) (Temporal)   Resp 17   Wt 128 lb 9.6 oz (58.3 kg)   SpO2 95%   BMI 22.07 kg/m²     Physical Exam  Constitutional:       Appearance: Normal appearance. She is well-developed. HENT:      Head: Normocephalic and atraumatic. Right Ear: Hearing normal.      Left Ear: Hearing normal.      Nose: Nose normal.      Mouth/Throat:      Mouth: Mucous membranes are moist.      Pharynx: Oropharynx is clear. Eyes:      Extraocular Movements: Extraocular movements intact. Conjunctiva/sclera: Conjunctivae normal.      Pupils: Pupils are equal, round, and reactive to light. Cardiovascular:      Rate and Rhythm: Normal rate and regular rhythm. Heart sounds: Normal heart sounds. No murmur heard. No friction rub. No gallop. Pulmonary:      Effort: Pulmonary effort is normal.      Breath sounds: Normal breath sounds. No wheezing, rhonchi or rales. Abdominal:      General: Bowel sounds are normal. There is no distension. Palpations: Abdomen is soft. Tenderness:  There is no abdominal tenderness. There is no guarding. Musculoskeletal:         General: Normal range of motion. Cervical back: Normal range of motion and neck supple. Skin:     General: Skin is warm and dry. Neurological:      General: No focal deficit present. Mental Status: She is alert and oriented to person, place, and time. Cranial Nerves: No cranial nerve deficit. Comments: Ambulates with walker   Psychiatric:         Mood and Affect: Mood normal.         Behavior: Behavior normal. Behavior is cooperative. Thought Content: Thought content normal.         ASSESSMENT/PLAN:    1. Confusion  Appears chronic, but possibly exacerbated by UTI. Will treat UTI and have follow up in two weeks. - POCT Urinalysis no Micro  - CBC Auto Differential; Future  - Comprehensive Metabolic Panel; Future  - Culture, Urine    2. Cognitive impairment  Discussed cognitive impairment with daughter and patient. Discussed options--assisted living, hospice, home health. Will refer to neurology. Follow up with PCP in two weeks. - Amb External Referral To Neurology  - CBC Auto Differential; Future  - Comprehensive Metabolic Panel; Future  - Culture, Urine    3. Acute cystitis without hematuria  macrobid as prescribed. Will send for culture  - nitrofurantoin, macrocrystal-monohydrate, (MACROBID) 100 MG capsule; Take 1 capsule by mouth 2 times daily for 5 days  Dispense: 10 capsule; Refill: 0               Return in about 2 weeks (around 10/21/2021) for with Gerhardt Baron.       (Please note that portions of this note may have been completed with a voice recognition program. Efforts were made to edit the dictations but occasionally words aremis-transcribed.)

## 2021-10-07 NOTE — TELEPHONE ENCOUNTER
Reason for Disposition   Longstanding confusion (e.g., dementia, stroke) and worsening    Answer Assessment - Initial Assessment Questions  1. LEVEL OF CONSCIOUSNESS: \"How is he (she, the patient) acting right now? \" (e.g., alert-oriented, confused, lethargic, stuporous, comatose)      Confused this morning. 2. ONSET: \"When did the confusion start? \"  (minutes, hours, days)      It's been going on for a while    3. PATTERN \"Does this come and go, or has it been constant since it started? \"  \"Is it present now? \"      Constant,      4. ALCOHOL or DRUGS: \"Has he been drinking alcohol or taking any drugs? \"       Denies    5. NARCOTIC MEDICATIONS: \"Has he been receiving any narcotic medications? \" (e.g., morphine, Vicodin)      Denies    6. CAUSE: \"What do you think is causing the confusion? \"       UTI or dementia setting in    7. OTHER SYMPTOMS: [place] there any other symptoms? \" (e.g., difficulty breathing, headache, fever, weakness)     Headache, shaky, sweaty, right shoulder pain    Protocols used: CONFUSION - DELIRIUM-ADULT-OH    Received call from Golden Caldwell at [facility] with Red Flag Complaint. Brief description of triage: confusion getting worse    Triage indicates for patient to be seen today or tomorrow    Care advice provided, patient verbalizes understanding; denies any other questions or concerns; instructed to call back for any new or worsening symptoms. Writer provided warm transfer to [facility] at [facility] for appointment scheduling. Attention Provider: Thank you for allowing me to participate in the care of your patient. The patient was connected to triage in response to information provided to the [facility]. Please do not respond through this encounter as the response is not directed to a shared pool.

## 2021-10-08 DIAGNOSIS — E87.6 LOW BLOOD POTASSIUM: Primary | ICD-10-CM

## 2021-10-08 DIAGNOSIS — E87.6 HYPOKALEMIA: Primary | ICD-10-CM

## 2021-10-08 RX ORDER — POTASSIUM CHLORIDE 20 MEQ/1
20 TABLET, EXTENDED RELEASE ORAL DAILY
Qty: 30 TABLET | Refills: 0 | Status: SHIPPED | OUTPATIENT
Start: 2021-10-08 | End: 2021-11-04

## 2021-10-09 LAB
ORGANISM: ABNORMAL
URINE CULTURE, ROUTINE: ABNORMAL

## 2021-10-11 DIAGNOSIS — N30.00 ACUTE CYSTITIS WITHOUT HEMATURIA: Primary | ICD-10-CM

## 2021-10-11 RX ORDER — SULFAMETHOXAZOLE AND TRIMETHOPRIM 800; 160 MG/1; MG/1
1 TABLET ORAL 2 TIMES DAILY
Qty: 6 TABLET | Refills: 0 | Status: SHIPPED | OUTPATIENT
Start: 2021-10-11 | End: 2021-10-14

## 2021-10-20 DIAGNOSIS — E79.0 HYPERURICEMIA: ICD-10-CM

## 2021-10-20 DIAGNOSIS — Z87.39 HISTORY OF GOUT: ICD-10-CM

## 2021-10-21 RX ORDER — ALLOPURINOL 100 MG/1
100 TABLET ORAL DAILY
Qty: 90 TABLET | Refills: 3 | Status: SHIPPED | OUTPATIENT
Start: 2021-10-21 | End: 2022-10-19

## 2021-10-22 ENCOUNTER — OFFICE VISIT (OUTPATIENT)
Dept: FAMILY MEDICINE CLINIC | Age: 86
End: 2021-10-22
Payer: MEDICARE

## 2021-10-22 VITALS
WEIGHT: 128.4 LBS | TEMPERATURE: 97.2 F | SYSTOLIC BLOOD PRESSURE: 126 MMHG | OXYGEN SATURATION: 96 % | HEART RATE: 62 BPM | BODY MASS INDEX: 22.04 KG/M2 | DIASTOLIC BLOOD PRESSURE: 78 MMHG | RESPIRATION RATE: 18 BRPM

## 2021-10-22 DIAGNOSIS — Z91.81 AT HIGH RISK FOR FALLS: Primary | ICD-10-CM

## 2021-10-22 DIAGNOSIS — M15.9 PRIMARY OSTEOARTHRITIS INVOLVING MULTIPLE JOINTS: Chronic | ICD-10-CM

## 2021-10-22 DIAGNOSIS — R41.3 MEMORY DIFFICULTIES: ICD-10-CM

## 2021-10-22 PROBLEM — N30.00 ACUTE CYSTITIS WITHOUT HEMATURIA: Status: RESOLVED | Noted: 2021-07-08 | Resolved: 2021-10-22

## 2021-10-22 PROCEDURE — 4040F PNEUMOC VAC/ADMIN/RCVD: CPT | Performed by: PHYSICIAN ASSISTANT

## 2021-10-22 PROCEDURE — 1090F PRES/ABSN URINE INCON ASSESS: CPT | Performed by: PHYSICIAN ASSISTANT

## 2021-10-22 PROCEDURE — 99213 OFFICE O/P EST LOW 20 MIN: CPT | Performed by: PHYSICIAN ASSISTANT

## 2021-10-22 PROCEDURE — 1036F TOBACCO NON-USER: CPT | Performed by: PHYSICIAN ASSISTANT

## 2021-10-22 PROCEDURE — G8484 FLU IMMUNIZE NO ADMIN: HCPCS | Performed by: PHYSICIAN ASSISTANT

## 2021-10-22 PROCEDURE — G8427 DOCREV CUR MEDS BY ELIG CLIN: HCPCS | Performed by: PHYSICIAN ASSISTANT

## 2021-10-22 PROCEDURE — G8420 CALC BMI NORM PARAMETERS: HCPCS | Performed by: PHYSICIAN ASSISTANT

## 2021-10-22 PROCEDURE — 1123F ACP DISCUSS/DSCN MKR DOCD: CPT | Performed by: PHYSICIAN ASSISTANT

## 2021-10-22 ASSESSMENT — PATIENT HEALTH QUESTIONNAIRE - PHQ9
1. LITTLE INTEREST OR PLEASURE IN DOING THINGS: 0
SUM OF ALL RESPONSES TO PHQ QUESTIONS 1-9: 0
2. FEELING DOWN, DEPRESSED OR HOPELESS: 0
SUM OF ALL RESPONSES TO PHQ9 QUESTIONS 1 & 2: 0

## 2021-10-22 ASSESSMENT — ENCOUNTER SYMPTOMS
SHORTNESS OF BREATH: 0
COUGH: 0

## 2021-10-22 NOTE — ASSESSMENT & PLAN NOTE
Keep appt with neurology next week, daughter and pt will consider hospice if needed but for now will be using [facility] for care.

## 2021-10-22 NOTE — PROGRESS NOTES
Tian Chamberlain  8/3/1929  80 y.o.  female    SUBJECTIVE:    Chief Complaint   Patient presents with    Other     Pt here for a 2 week f/u. No questions or concerns       HPI   Pt here today for 2 week f/u. Pt was seen two weeks ago by other provider for acutely worsening confusion. Pt was positive for UTI and has finished medication. Daughter is with her today and states that she does seem to be back to baseline at this time. Pt continues to live in her own home but currently has someone in home from [address] 5 days a week and family is there on other day. They are in the process of changing home health care company and will have someone 7 days a week in future. Pt has been referred to neurology for continued issues with confusion and has upcoming appt next week. Pt appears stable today but does need assistance with getting out of chair. Pt has severe OA of knees/hips (multiple joints) and is incapable of standing from any chair in her home. Needs assistance to get to standing position. Once she is up, she is able to ambulate with walker without assistance. Pt most definitely should use a lift chair to help prevent risks of falls/skin breakdown and further deterioration of her condition.      PHQ Scores 10/22/2021 10/7/2021 8/27/2021 7/8/2021 6/29/2021 9/25/2020 8/7/2020   PHQ2 Score 0 1 0 5 0 0 1   PHQ9 Score 0 1 0 10 0 0 1     Interpretation of Total Score Depression Severity: 1-4 = Minimal depression, 5-9 = Mild depression, 10-14 = Moderate depression, 15-19 = Moderately severe depression, 20-27 = Severe depression     Current Outpatient Medications on File Prior to Visit   Medication Sig Dispense Refill    allopurinol (ZYLOPRIM) 100 MG tablet TAKE 1 TABLET BY MOUTH DAILY 90 tablet 3    potassium chloride (KLOR-CON M) 20 MEQ extended release tablet Take 1 tablet by mouth daily 30 tablet 0    docusate sodium (DOK) 100 MG capsule TAKE ONE CAPSULE TWO TIMES A DAY AS NEEDED FOR CONSTIPATION 60 capsule 5  UNABLE TO FIND Left chair 1 Units 0    levothyroxine (SYNTHROID) 100 MCG tablet TAKE 1 TABLET BY MOUTH IN THE MORNING ON AN EMPTY STOMACH. 90 tablet 3    venlafaxine (EFFEXOR XR) 75 MG extended release capsule TAKE 1 CAPSULE BY MOUTH DAILY 30 capsule 11    furosemide (LASIX) 40 MG tablet TAKE 1 TABLET BY MOUTH DAILY 30 tablet 11    meclizine (ANTIVERT) 25 MG tablet Take 1 tablet by mouth 3 times daily as needed for Dizziness 90 tablet 5    traZODone (DESYREL) 50 MG tablet Take 1 tablet by mouth nightly 90 tablet 3    omeprazole (PRILOSEC) 20 MG delayed release capsule Take 1 capsule by mouth Daily 90 capsule 3    montelukast (SINGULAIR) 10 MG tablet Take 1 tablet by mouth nightly 90 tablet 3    UNABLE TO FIND rollator walker with seat and brakes 1 Units 0    famotidine (PEPCID) 40 MG tablet TAKE 1 TABLET BY MOUTH NIGHTLY 90 tablet 3    albuterol sulfate HFA (VENTOLIN HFA) 108 (90 Base) MCG/ACT inhaler Inhale 2 puffs into the lungs every 6 hours as needed for Wheezing 3 Inhaler 3    olopatadine (PATADAY) 0.2 % SOLN ophthalmic solution   5    UNABLE TO FIND Provide one electronic blood pressure cuff for home monitoring 1 each 0    Blood Glucose Monitoring Suppl ([employer] BLOOD GLUCOSE MONITOR) STEWART Use as directed for daily blood sugar testing 2 Device 3    blood glucose test strips (EMBRACE BLOOD GLUCOSE TEST) strip 1 each by In Vitro route daily As needed. 100 each 3    acetaminophen (TYLENOL) 650 MG CR tablet Take 1 tablet by mouth every 8 hours as needed for Pain 90 tablet 3    Misc. Devices (TUB TRANSFER BOARD) MISC Use as directed 1 each 0    aspirin EC 81 MG EC tablet Take 81 mg by mouth daily  30 tablet 5    [DISCONTINUED] docusate sodium (DOK) 100 MG capsule TAKE ONE CAPSULE TWO TIMES A DAY AS NEEDED FOR CONSTIPATION 60 capsule 5     No current facility-administered medications on file prior to visit.        Allergies   Allergen Reactions    Codeine Other (See Comments)     Gets \"jittery feeling\"       Past Medical History:   Diagnosis Date    ACP (advance care planning) 8/10/2020    Acute cystitis without hematuria 7/8/2021    Allergic rhinitis     Anxiety     Arthritis     Bradycardia     Cellulitis, leg 1/8/2012    Depression     Diabetes mellitus ([facility] [address]     sugars controlled off meds    Diabetic eye exam ([facility] [address] 1/28/16    no retinopathy    Dysuria 9/28/2020    Flu vaccine need 9/28/2020    Gout     Hyperlipidemia     Hypertension     Hypothyroidism        Past Surgical History:   Procedure Laterality Date    APPENDECTOMY  13 yrs ago    CARPAL TUNNEL RELEASE      bilateral    COLONOSCOPY      ENDOSCOPY, COLON, DIAGNOSTIC  7/8/13    gastritis, hiatal hernia    EYE SURGERY      both eyes    HERNIA REPAIR      umbilical    HIP SURGERY Left 08/22/2016    ORIF    HYSTERECTOMY      complete    JOINT REPLACEMENT      left knee    THYROIDECTOMY, PARTIAL      ULNAR TUNNEL RELEASE      bilateral       Social History     Socioeconomic History    Marital status:      Spouse name: None    Number of children: None    Years of education: None    Highest education level: None   Occupational History    None   Tobacco Use    Smoking status: Never Smoker    Smokeless tobacco: Never Used   Vaping Use    Vaping Use: Never used   Substance and Sexual Activity    Alcohol use:  Yes     Alcohol/week: 1.0 standard drinks     Types: 1 Cans of beer per week     Comment: may be less than once weekly    Drug use: No    Sexual activity: Yes     Partners: Male     Comment: states sometimes   Other Topics Concern    None   Social History Narrative    None     Social Determinants of Health     Financial Resource Strain: Low Risk     Difficulty of Paying Living Expenses: Not hard at all   Food Insecurity: No Food Insecurity    Worried About Running Out of Food in the Last Year: Never true    Shena of Food in the Last Year: Never true   Transportation Needs:     Lack of Transportation (Medical):  Lack of Transportation (Non-Medical):    Physical Activity:     Days of Exercise per Week:     Minutes of Exercise per Session:    Stress:     Feeling of Stress :    Social Connections:     Frequency of Communication with Friends and Family:     Frequency of Social Gatherings with Friends and Family:     Attends [employer] Services:     Active Member of Clubs or Organizations:     Attends Club or Organization Meetings:     Marital Status:    Intimate Partner Violence:     Fear of Current or Ex-Partner:     Emotionally Abused:     Physically Abused:     Sexually Abused:        Review of Systems   Constitutional: Negative for chills, fever and unexpected weight change. Respiratory: Negative for cough and shortness of breath. Cardiovascular: Negative for chest pain. Genitourinary: Negative for dysuria. Musculoskeletal: Positive for arthralgias and gait problem. Neurological: Negative for dizziness and headaches. OBJECTIVE:    /78 (Site: Left Upper Arm, Position: Sitting, Cuff Size: Medium Adult)   Pulse 62   Temp 97.2 °F (36.2 °C)   Resp 18   Wt 128 lb 6.4 oz (58.2 kg)   SpO2 96%   BMI 22.04 kg/m²     Physical Exam  Vitals reviewed. Constitutional:       General: She is not in acute distress. Appearance: Normal appearance. HENT:      Head: Normocephalic. Cardiovascular:      Rate and Rhythm: Normal rate and regular rhythm. Heart sounds: Normal heart sounds. Pulmonary:      Effort: Pulmonary effort is normal.      Breath sounds: Normal breath sounds. Musculoskeletal:         General: Tenderness present. Skin:     General: Skin is warm and dry. Neurological:      Mental Status: She is alert and oriented to person, place, and time. Mental status is at baseline.       Gait: Gait abnormal.      Comments: Ambulates with walker   Psychiatric:         Mood and Affect: Mood normal.         [place]:    Problem List        Musculoskeletal and Integument    Primary osteoarthritis involving multiple joints (Chronic)     Will need to send copy of today's note to [facility]          Relevant Medications    aspirin EC 81 MG EC tablet    Misc. Devices (TUB TRANSFER BOARD) MISC    acetaminophen (TYLENOL) 650 MG CR tablet    allopurinol (ZYLOPRIM) 100 MG tablet       Other    Memory difficulties     Keep appt with neurology next week, daughter and pt will consider hospice if needed but for now will be using [address] for care. Return in about 5 months (around 3/22/2022). On the basis of positive falls risk screening, assessment and plan is as follows: home safety tips provided.

## 2021-10-26 ENCOUNTER — TELEPHONE (OUTPATIENT)
Dept: FAMILY MEDICINE CLINIC | Age: 86
End: 2021-10-26

## 2021-10-26 NOTE — TELEPHONE ENCOUNTER
Can we call daughter. Does she need the order for hospice sent somewhere or does she just need a letter stating needs hospice?

## 2021-10-28 NOTE — TELEPHONE ENCOUNTER
Patients daughter Aron Caballero was seen today in the office and requested the letter for hospice care for her mother

## 2021-11-04 ENCOUNTER — TELEPHONE (OUTPATIENT)
Dept: FAMILY MEDICINE CLINIC | Age: 86
End: 2021-11-04

## 2021-11-04 NOTE — TELEPHONE ENCOUNTER
[facility] called stating daughter contacted them informed them of pt's condition they called us to have some information faxed over but, did not specify what to fax to # [phone].  Please advise

## 2021-11-05 NOTE — TELEPHONE ENCOUNTER
Please call back and verify what is needed, typically they request med list, last H and P or most recent notes. If she is transferring to hospice, please have their physician follow.  Thanks

## 2021-11-06 ENCOUNTER — HOSPITAL ENCOUNTER (OUTPATIENT)
Age: 86
Setting detail: SPECIMEN
Discharge: HOME | End: 2021-11-06
Payer: MEDICARE

## 2021-11-06 LAB
ERYTHROCYTE SEDIMENTATION RATE: 12 MM/HR (ref 0–30)
TSH HIGH SENSITIVITY: 2.15 UIU/ML (ref 0.27–4.2)

## 2021-11-06 PROCEDURE — 84443 ASSAY THYROID STIM HORMONE: CPT

## 2021-11-06 PROCEDURE — 36415 COLL VENOUS BLD VENIPUNCTURE: CPT

## 2021-11-06 PROCEDURE — 82746 ASSAY OF FOLIC ACID SERUM: CPT

## 2021-11-06 PROCEDURE — 85652 RBC SED RATE AUTOMATED: CPT

## 2021-11-06 PROCEDURE — 82607 VITAMIN B-12: CPT

## 2021-11-06 PROCEDURE — 86592 SYPHILIS TEST NON-TREP QUAL: CPT

## 2021-11-07 LAB
FOLATE: >20 NG/ML (ref 3.1–17.5)
VITAMIN B-12: 711.4 PG/ML (ref 211–911)

## 2021-11-08 LAB — RPR: NON REACTIVE

## 2021-11-29 ENCOUNTER — TELEPHONE (OUTPATIENT)
Dept: FAMILY MEDICINE CLINIC | Age: 86
End: 2021-11-29

## 2021-11-29 NOTE — TELEPHONE ENCOUNTER
Called and spoke to Lyn advised Luis Armando Guan is out of the office this week we Aston Caballero is unable to give orders due to she had not seen the patient will need to wait for the orders from patients PCP.  Watch for symptoms from the fall can also schedule an appointment for evaluation

## 2021-11-29 NOTE — TELEPHONE ENCOUNTER
Alison Chaparro, [address] nurse called and left message on MA line stating that she did a recert for homecare today and needs a V/O to cont seeing her weekly to fill her med reminder    Also states that pt fell over the weekend. States that she was getting out of her chair and tripped over her wheelchair. Pt didn't go to ED. Reports no skin issues and no injuries. Please advise.

## 2021-11-29 NOTE — TELEPHONE ENCOUNTER
Will have to wait for home care orders from PCP since she has not been seen by another provider in this office. Monitor for any symptoms associated with fall. Can also schedule an appointment for evaluation.

## 2021-12-13 ENCOUNTER — HOSPITAL ENCOUNTER (OUTPATIENT)
Dept: CT IMAGING | Age: 86
Discharge: HOME | End: 2021-12-13
Payer: MEDICARE

## 2021-12-13 ENCOUNTER — HOSPITAL ENCOUNTER (OUTPATIENT)
Dept: NEUROLOGY | Age: 86
Discharge: HOME | End: 2021-12-13
Payer: MEDICARE

## 2021-12-13 DIAGNOSIS — F03.90 DEMENTIA WITHOUT BEHAVIORAL DISTURBANCE, UNSPECIFIED DEMENTIA TYPE: ICD-10-CM

## 2021-12-13 PROCEDURE — 70450 CT HEAD/BRAIN W/O DYE: CPT

## 2021-12-13 PROCEDURE — 95816 EEG AWAKE AND DROWSY: CPT | Performed by: PSYCHIATRY & NEUROLOGY

## 2021-12-13 PROCEDURE — 95819 EEG AWAKE AND ASLEEP: CPT

## 2021-12-16 NOTE — PROCEDURES
Electroencephalography (EEG) [address] [facility]        Patient:  Antonio Roblero Procedure Date: 12/13/2021   YOB: 1929 Referring Practitioner: Vito Lopez MD   MRN: [id number] Interpreting Physician: Dr. Gerardo Camarena         HISTORY:    This is a 80 y.o. old female presenting for evaluation of dementia. REPORT:    With the patient alert, the background showed a well-developed, well-sustained theta rhythm in the 7 Hz range. The background activity attenuated with eye opening. There was symmetric low voltage beta activity seen in the anterior electrodes. No focal slowing or epileptiform discharges were noted. The patient remained awake for the entire duration of the recording. Photic stimulation were performed as an activating maneuver during the recording; no abnormalities were elicited by this maneuver. Hyperventilation was not performed. No abnormalities were seen in the EKG monitoring channel. IMPRESSION:      Abnormal EEG due to mild diffuse slowing of the background. No focal slowing or epileptiform discharges were seen. This is a non-specific finding and can be seen in variety of encephalopathies. This can also be seen in the setting of advanced age. Clinical correlation advised.     Electronically signed by: Vanna Guo DO 12/15/2021 11:22 PM

## 2021-12-17 ENCOUNTER — HOSPITAL ENCOUNTER (EMERGENCY)
Age: 86
Discharge: HOME | End: 2021-12-17
Attending: EMERGENCY MEDICINE
Payer: MEDICARE

## 2021-12-17 ENCOUNTER — APPOINTMENT (OUTPATIENT)
Dept: CT IMAGING | Age: 86
End: 2021-12-17
Payer: MEDICARE

## 2021-12-17 VITALS
OXYGEN SATURATION: 100 % | DIASTOLIC BLOOD PRESSURE: 99 MMHG | BODY MASS INDEX: 25.13 KG/M2 | HEIGHT: 60 IN | RESPIRATION RATE: 16 BRPM | HEART RATE: 72 BPM | WEIGHT: 128 LBS | TEMPERATURE: 98.2 F | SYSTOLIC BLOOD PRESSURE: 146 MMHG

## 2021-12-17 DIAGNOSIS — N39.0 URINARY TRACT INFECTION WITHOUT HEMATURIA, SITE UNSPECIFIED: Primary | ICD-10-CM

## 2021-12-17 LAB
ALBUMIN SERPL-MCNC: 4.5 GM/DL (ref 3.4–5)
ALP BLD-CCNC: 109 IU/L (ref 40–129)
ALT SERPL-CCNC: 13 U/L (ref 10–40)
ANION GAP SERPL CALCULATED.3IONS-SCNC: 15 MMOL/L (ref 4–16)
AST SERPL-CCNC: 24 IU/L (ref 15–37)
BACTERIA: ABNORMAL /HPF
BASOPHILS ABSOLUTE: 0 K/CU MM
BASOPHILS RELATIVE PERCENT: 0.7 % (ref 0–1)
BILIRUB SERPL-MCNC: 0.5 MG/DL (ref 0–1)
BILIRUBIN URINE: NEGATIVE MG/DL
BLOOD, URINE: NEGATIVE
BUN BLDV-MCNC: 17 MG/DL (ref 6–23)
CALCIUM SERPL-MCNC: 9.8 MG/DL (ref 8.3–10.6)
CAST TYPE: ABNORMAL /HPF
CHLORIDE BLD-SCNC: 95 MMOL/L (ref 99–110)
CLARITY: ABNORMAL
CO2: 28 MMOL/L (ref 21–32)
COLOR: YELLOW
CREAT SERPL-MCNC: 0.8 MG/DL (ref 0.6–1.1)
CRYSTAL TYPE: ABNORMAL /HPF
DIFFERENTIAL TYPE: ABNORMAL
EOSINOPHILS ABSOLUTE: 0.1 K/CU MM
EOSINOPHILS RELATIVE PERCENT: 2.2 % (ref 0–3)
EPITHELIAL CELLS, UA: ABNORMAL /HPF
GFR AFRICAN AMERICAN: >60 ML/MIN/1.73M2
GFR NON-AFRICAN AMERICAN: >60 ML/MIN/1.73M2
GLUCOSE BLD-MCNC: 106 MG/DL (ref 70–99)
GLUCOSE, URINE: NEGATIVE MG/DL
HCT VFR BLD CALC: 40.7 % (ref 37–47)
HEMOGLOBIN: 13.5 GM/DL (ref 12.5–16)
IMMATURE NEUTROPHIL %: 0.2 % (ref 0–0.43)
KETONES, URINE: NEGATIVE MG/DL
LEUKOCYTE ESTERASE, URINE: ABNORMAL
LYMPHOCYTES ABSOLUTE: 1.5 K/CU MM
LYMPHOCYTES RELATIVE PERCENT: 25.9 % (ref 24–44)
MCH RBC QN AUTO: 29.5 PG (ref 27–31)
MCHC RBC AUTO-ENTMCNC: 33.2 % (ref 32–36)
MCV RBC AUTO: 89.1 FL (ref 78–100)
MONOCYTES ABSOLUTE: 0.4 K/CU MM
MONOCYTES RELATIVE PERCENT: 6.9 % (ref 0–4)
MUCUS: NEGATIVE HPF
NITRITE URINE, QUANTITATIVE: NEGATIVE
PDW BLD-RTO: 13 % (ref 11.7–14.9)
PH, URINE: 7 (ref 5–8)
PLATELET # BLD: 149 K/CU MM (ref 140–440)
PMV BLD AUTO: 11 FL (ref 7.5–11.1)
POTASSIUM SERPL-SCNC: 3.8 MMOL/L (ref 3.5–5.1)
PROTEIN UA: NEGATIVE MG/DL
RBC # BLD: 4.57 M/CU MM (ref 4.2–5.4)
RBC URINE: ABNORMAL /HPF (ref 0–6)
SEGMENTED NEUTROPHILS ABSOLUTE COUNT: 3.8 K/CU MM
SEGMENTED NEUTROPHILS RELATIVE PERCENT: 64.1 % (ref 36–66)
SODIUM BLD-SCNC: 138 MMOL/L (ref 135–145)
SPECIFIC GRAVITY UA: 1.01 (ref 1–1.03)
TOTAL IMMATURE NEUTOROPHIL: 0.01 K/CU MM
TOTAL PROTEIN: 7.4 GM/DL (ref 6.4–8.2)
TROPONIN T: <0.01 NG/ML
UROBILINOGEN, URINE: 0.2 MG/DL (ref 0.2–1)
VOLUME, (UVOL): 12 ML (ref 10–12)
WBC # BLD: 5.9 K/CU MM (ref 4–10.5)
WBC UA: ABNORMAL /HPF (ref 0–5)

## 2021-12-17 PROCEDURE — 93005 ELECTROCARDIOGRAM TRACING: CPT | Performed by: EMERGENCY MEDICINE

## 2021-12-17 PROCEDURE — 85025 COMPLETE CBC W/AUTO DIFF WBC: CPT

## 2021-12-17 PROCEDURE — 84484 ASSAY OF TROPONIN QUANT: CPT

## 2021-12-17 PROCEDURE — 70450 CT HEAD/BRAIN W/O DYE: CPT

## 2021-12-17 PROCEDURE — 87086 URINE CULTURE/COLONY COUNT: CPT

## 2021-12-17 PROCEDURE — 80053 COMPREHEN METABOLIC PANEL: CPT

## 2021-12-17 PROCEDURE — 81001 URINALYSIS AUTO W/SCOPE: CPT

## 2021-12-17 PROCEDURE — 87077 CULTURE AEROBIC IDENTIFY: CPT

## 2021-12-17 PROCEDURE — 99285 EMERGENCY DEPT VISIT HI MDM: CPT

## 2021-12-17 PROCEDURE — 6370000000 HC RX 637 (ALT 250 FOR IP): Performed by: EMERGENCY MEDICINE

## 2021-12-17 PROCEDURE — 87186 SC STD MICRODIL/AGAR DIL: CPT

## 2021-12-17 RX ORDER — CEPHALEXIN 500 MG/1
500 CAPSULE ORAL ONCE
Status: COMPLETED | OUTPATIENT
Start: 2021-12-17 | End: 2021-12-17

## 2021-12-17 RX ORDER — CEPHALEXIN 500 MG/1
500 CAPSULE ORAL 2 TIMES DAILY
Qty: 14 CAPSULE | Refills: 0 | Status: SHIPPED | OUTPATIENT
Start: 2021-12-17 | End: 2021-12-24

## 2021-12-17 RX ORDER — ACETAMINOPHEN 500 MG
1000 TABLET ORAL ONCE
Status: COMPLETED | OUTPATIENT
Start: 2021-12-17 | End: 2021-12-17

## 2021-12-17 RX ADMIN — CEPHALEXIN 500 MG: 500 CAPSULE ORAL at 19:56

## 2021-12-17 RX ADMIN — ACETAMINOPHEN 1000 MG: 500 TABLET ORAL at 17:57

## 2021-12-17 ASSESSMENT — PAIN SCALES - GENERAL: PAINLEVEL_OUTOF10: 0

## 2021-12-17 NOTE — ED PROVIDER NOTES
Triage Chief Complaint:   Dizziness and Altered Mental Status    Napaimute:  Cm Aldridge is a 80 y.o. female that presents with dizziness, headache, increased confusion and high blood pressure. History is obtained from patient and daughter. Daughter reports that she is concerned that patient might have a urinary tract infection. Patient has had some increased confusion and urinary frequency/urgency and patient with a history of UTIs in the past with similar symptoms. Additionally, patient was complaining of a headache and dizziness both of which have essentially resolved prior to arrival.  Patient still reports a very mild frontal headache that is bilateral and nonradiating. Patient tells me \"it is almost gone\". No head injuries or recent falls. No neck stiffness. No recent illnesses. No current fevers. Patient has been tolerating normal diet. Daughter is concerned that patient may have missed her evening medications. Patient does have some dementia which has been slowly progressive and diagnosis past summer. Daughter does report that patient does intermittently get confused with her dementia and reports \"it is hard to tell for just because she is also 89\". Patient does live by herself however has 9a-5p essentially home health care throughout the week and daughter also is just down the hallway.     ROS:  General:  No fevers, no chills, no weakness  Eyes:  No recent vison changes, no discharge  ENT:  No sore throat, no nasal congestion, no hearing changes  Cardiovascular:  No chest pain, no palpitations  Respiratory:  No shortness of breath, no cough, no wheezing  Gastrointestinal:  No pain, no nausea, no vomiting, no diarrhea  Musculoskeletal:  No muscle pain, no joint pain  Skin:  No rash, no pruritis, no easy bruising  Neurologic:  No speech problems, + headache, no extremity numbness, no extremity tingling, no extremity weakness, + dizziness   Psychiatric:  No anxiety  Genitourinary:  No dysuria, no hematuria, + frequency, + urgency  Endocrine:  No unexpected weight gain, no unexpected weight loss  Extremities:  no edema, no pain    Past Medical History:   Diagnosis Date    ACP (advance care planning) 8/10/2020    Acute cystitis without hematuria 7/8/2021    Allergic rhinitis     Anxiety     Arthritis     Bradycardia     Cellulitis, leg 1/8/2012    Dementia ([facility]     Depression     Diabetes mellitus (HCC)     sugars controlled off meds    Diabetic eye exam ([facility] 1/28/16    no retinopathy    Dysuria 9/28/2020    Flu vaccine need 9/28/2020    Gout     Hyperlipidemia     Hypertension     Hypothyroidism      Past Surgical History:   Procedure Laterality Date    APPENDECTOMY  13 yrs ago    CARPAL TUNNEL RELEASE      bilateral    COLONOSCOPY      ENDOSCOPY, COLON, DIAGNOSTIC  7/8/13    gastritis, hiatal hernia    EYE SURGERY      both eyes    HERNIA REPAIR      umbilical    HIP SURGERY Left 08/22/2016    ORIF    HYSTERECTOMY      complete    JOINT REPLACEMENT      left knee    THYROIDECTOMY, PARTIAL      ULNAR TUNNEL RELEASE      bilateral     Family History   Problem Relation Age of Onset    Heart Disease Father     Cancer Brother     Heart Attack Brother      Social History     Socioeconomic History    Marital status:      Spouse name: Not on file    Number of children: Not on file    Years of education: Not on file    Highest education level: Not on file   Occupational History    Not on file   Tobacco Use    Smoking status: Never Smoker    Smokeless tobacco: Never Used   Vaping Use    Vaping Use: Never used   Substance and Sexual Activity    Alcohol use:  Yes     Alcohol/week: 1.0 standard drink     Types: 1 Cans of beer per week     Comment: may be less than once weekly    Drug use: No    Sexual activity: Yes     Partners: Male     Comment: states sometimes   Other Topics Concern    Not on file   Social History Narrative    Not on file     Social Determinants of Health     Financial Resource Strain: Low Risk     Difficulty of Paying Living Expenses: Not hard at all   Food Insecurity: No Food Insecurity    Worried About Running Out of Food in the Last Year: Never true    Shena of Food in the Last Year: Never true   Transportation Needs:     Lack of Transportation (Medical): Not on file    Lack of Transportation (Non-Medical): Not on file   Physical Activity:     Days of Exercise per Week: Not on file    Minutes of Exercise per Session: Not on file   Stress:     Feeling of Stress : Not on file   Social Connections:     Frequency of Communication with Friends and Family: Not on file    Frequency of Social Gatherings with Friends and Family: Not on file    Attends Confucianism Services: Not on file    Active Member of [address] [place] [address] or Organizations: Not on file    Attends Club or Organization Meetings: Not on file    Marital Status: Not on file   Intimate Partner Violence:     Fear of Current or Ex-Partner: Not on file    Emotionally Abused: Not on file    Physically Abused: Not on file    Sexually Abused: Not on file   Housing Stability:     Unable to Pay for Housing in the Last Year: Not on file    Number of [place] in the Last Year: Not on file    Unstable Housing in the Last Year: Not on file     No current facility-administered medications for this encounter. Current Outpatient Medications   Medication Sig Dispense Refill    potassium chloride (KLOR-CON M) 20 MEQ extended release tablet TAKE 1 TABLET BY MOUTH DAILY 30 tablet 5    allopurinol (ZYLOPRIM) 100 MG tablet TAKE 1 TABLET BY MOUTH DAILY 90 tablet 3    docusate sodium (DOK) 100 MG capsule TAKE ONE CAPSULE TWO TIMES A DAY AS NEEDED FOR CONSTIPATION 60 capsule 5    UNABLE TO FIND Left chair 1 Units 0    levothyroxine (SYNTHROID) 100 MCG tablet TAKE 1 TABLET BY MOUTH IN THE MORNING ON AN EMPTY STOMACH.  90 tablet 3    venlafaxine (EFFEXOR XR) 75 MG extended release capsule TAKE 1 CAPSULE BY MOUTH DAILY 30 capsule 11    furosemide (LASIX) 40 MG tablet TAKE 1 TABLET BY MOUTH DAILY 30 tablet 11    meclizine (ANTIVERT) 25 MG tablet Take 1 tablet by mouth 3 times daily as needed for Dizziness 90 tablet 5    traZODone (DESYREL) 50 MG tablet Take 1 tablet by mouth nightly 90 tablet 3    omeprazole (PRILOSEC) 20 MG delayed release capsule Take 1 capsule by mouth Daily 90 capsule 3    montelukast (SINGULAIR) 10 MG tablet Take 1 tablet by mouth nightly 90 tablet 3    UNABLE TO FIND rollator walker with seat and brakes 1 Units 0    famotidine (PEPCID) 40 MG tablet TAKE 1 TABLET BY MOUTH NIGHTLY 90 tablet 3    albuterol sulfate HFA (VENTOLIN HFA) 108 (90 Base) MCG/ACT inhaler Inhale 2 puffs into the lungs every 6 hours as needed for Wheezing 3 Inhaler 3    olopatadine (PATADAY) 0.2 % SOLN ophthalmic solution   5    UNABLE TO FIND Provide one electronic blood pressure cuff for home monitoring 1 each 0    Blood Glucose Monitoring Suppl (Money Mover BLOOD GLUCOSE MONITOR) STEWART Use as directed for daily blood sugar testing 2 Device 3    blood glucose test strips (Saut MediaACE BLOOD GLUCOSE TEST) strip 1 each by In Vitro route daily As needed. 100 each 3    acetaminophen (TYLENOL) 650 MG CR tablet Take 1 tablet by mouth every 8 hours as needed for Pain 90 tablet 3    Misc. Devices (TUB TRANSFER BOARD) MISC Use as directed 1 each 0    aspirin EC 81 MG EC tablet Take 81 mg by mouth daily  30 tablet 5     Allergies   Allergen Reactions    Codeine Other (See Comments)     Gets \"jittery feeling\"       Nursing Notes Reviewed    Physical Exam:  ED Triage Vitals [12/17/21 1715]   Enc Vitals Group      BP (!) 181/96      Pulse 83      Resp 20      Temp 98.2 °F (36.8 °C)      Temp Source Oral      SpO2 100 %      Weight 128 lb (58.1 kg)      Height 5' (1.524 m)      Head Circumference       Peak Flow       Pain Score       Pain Loc       Pain Edu? Excl. in HOS[facility]?         My pulse ox interpretation is  normal    General appearance:  No acute distress. Sitting upright in bed. Appears stated age. Very pleasant. Skin:  Warm. Dry. No diaphoresis. No rash to the exposed skin of face or scalp. Eye:  Extraocular movements intact. Pupils are equal round react to light. No horizontal gaze nystagmus. Ears, nose, mouth and throat:  Oral mucosa moist   Neck:  Trachea midline. No meningismus or rigidity. Extremity:  No swelling. Normal ROM     Heart:  Regular rate and rhythm, normal S1 & S2, no extra heart sounds. Perfusion:  Intact. Strong symmetric bilateral radial and PT pulses. Respiratory:  Lungs clear to auscultation bilaterally. Respirations nonlabored. Speaking clearly in full sentences without respiratory distress. Abdominal:  Normal bowel sounds. Soft. Nontender. Non distended. Back:  No CVA tenderness to palpation. Kyphosis noted. Neurological:  Alert and oriented times 3. No focal neuro deficits. NIH Stroke Scale     Interval: Baseline  Time: 6:47 PM  Person Administering Scale: Rajat Hernandez MD   Administer stroke scale items in the order listed. Record performance in each category after each subscale exam. Do not go back and change scores. Follow directions provided for each exam technique. Scores should reflect what the patient does, not what the clinician thinks the patient can do. The clinician should record answers while administering the exam and work quickly. Except where indicated, the patient should not be coached (i.e., repeated requests to patient to make a special effort). 1a  Level of consciousness: 0=alert; keenly responsive   1b. LOC questions:  0=Performs both tasks correctly   1c. LOC commands: 0=Performs both tasks correctly   2. Best Gaze: 0=normal   3. Visual: 0=No visual loss   4. Facial Palsy: 0=Normal symmetric movement   5a. Motor left arm: 0=No drift, limb holds 90 (or 45) degrees for full 10 seconds   5b.   Motor right arm: 0=No drift, limb holds 90 CONTRAST    Result Date: 12/13/2021  EXAMINATION: CT OF THE HEAD WITHOUT CONTRAST  12/13/2021 9:49 am TECHNIQUE: CT of the head was performed without the administration of intravenous contrast. Dose modulation, iterative reconstruction, and/or weight based adjustment of the mA/kV was utilized to reduce the radiation dose to as low as reasonably achievable. COMPARISON: 07/23/2021. HISTORY: ORDERING SYSTEM PROVIDED HISTORY: Dementia without behavioral disturbance, unspecified [facility] TECHNOLOGIST PROVIDED HISTORY: Relevant Medical/Surgical History: Dementia without behavioral disturbance, unspecified dementia type ([facility] FINDINGS: BRAIN/VENTRICLES: There is no acute intracranial hemorrhage, mass effect or midline shift. No abnormal extra-axial fluid collection. The gray-white differentiation is maintained without evidence of an acute infarct. There is prominence of the ventricles and sulci due to global parenchymal volume loss. There are nonspecific areas of hypoattenuation within the periventricular and subcortical white matter, which likely represent chronic microvascular ischemic change. 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 There are punctate bilateral basal ganglia calcifications. ORBITS: The visualized portion of the orbits demonstrate no acute abnormality. SINUSES: The visualized paranasal sinuses and mastoid air cells demonstrate no acute abnormality. SOFT TISSUES/SKULL: No acute abnormality of the visualized skull or soft tissues. 1.No acute intracranial abnormality. RECOMMENDATIONS: Unavailable       MDM:  Pt presents as above. Emergent conditions considered. Presentation prompted initial labs, EKG and imaging. Patient treated symptomatically with Tylenol. Code stroke is not called out as patient with NIH of 0 and symptoms have almost completely resolved in terms of the dizziness. EKG with normal sinus rhythm as above. Troponin negative.     Patient's work-up and final disposition is pending at signout to overnight physician, Dr. Zainab Alcantar. Care of this patient occurred during the COVID-19 pandemic. Clinical Impression:  1. Urinary tract infection without hematuria, site unspecified      Disposition referral (if applicable):  No follow-up provider specified. Disposition medications (if applicable):  New Prescriptions    No medications on file       Comment: Please note this report has been produced using speech recognition software and may contain errors related to that system including errors in grammar, punctuation, and spelling, as well as words and phrases that may be inappropriate. If there are any questions or concerns please feel free to contact the dictating provider for clarification.        Ruth Napoles MD  12/18/21 6409

## 2021-12-17 NOTE — ED TRIAGE NOTES
Pt contacted sched MMG, DEXA, A1C & Urine Microalbumin,reminder mailed   Pt presents to ED via private auto for c/o dizziness, confusion, possible UTI. Pt alert. Pt's skin warm, dry, intact. To room via w/c. Assisted to bed. Family at bedside.

## 2021-12-18 LAB
EKG ATRIAL RATE: 84 BPM
EKG DIAGNOSIS: NORMAL
EKG Q-T INTERVAL: 394 MS
EKG QRS DURATION: 86 MS
EKG QTC CALCULATION (BAZETT): 465 MS
EKG R AXIS: -48 DEGREES
EKG T AXIS: 46 DEGREES
EKG VENTRICULAR RATE: 84 BPM

## 2021-12-18 PROCEDURE — 93010 ELECTROCARDIOGRAM REPORT: CPT | Performed by: INTERNAL MEDICINE

## 2021-12-18 NOTE — ED NOTES
Discharge instructions reviewed with patient. Patient voiced understanding.  Pt wheeled out of ER to waiting car with daughter without issue     Violette Gregg RN  12/17/21 2008

## 2021-12-18 NOTE — ED PROVIDER NOTES
Emergency Department Encounter  Location: [address]    Patient: Barrera Souza  MRN: [id number]  : 8/3/1929  Date of evaluation: 2021  ED Provider: Arik Guardado MD    1900:pNenam. Barrera Souza was checked out to me by Dr. Sameer Orosco. Please see his/her initial documentation for details of the patient's initial ED presentation, physical exam and completed studies.     In brief, Barrera Souza is a 80 y.o. female that presented to the emergency department with intermittent confusion, increased urinary frequency    I have reviewed and interpreted all of the currently available lab results and diagnostics from this visit:  Results for orders placed or performed during the hospital encounter of 21   CBC auto diff   Result Value Ref Range    WBC 5.9 4.0 - 10.5 K/CU MM    RBC 4.57 4.2 - 5.4 M/CU MM    Hemoglobin 13.5 12.5 - 16.0 GM/DL    Hematocrit 40.7 37 - 47 %    MCV 89.1 78 - 100 FL    MCH 29.5 27 - 31 PG    MCHC 33.2 32.0 - 36.0 %    RDW 13.0 11.7 - 14.9 %    Platelets 793 407 - 113 K/CU MM    MPV 11.0 7.5 - 11.1 FL    Differential Type AUTOMATED DIFFERENTIAL     Segs Relative 64.1 36 - 66 %    Lymphocytes % 25.9 24 - 44 %    Monocytes % 6.9 (H) 0 - 4 %    Eosinophils % 2.2 0 - 3 %    Basophils % 0.7 0 - 1 %    Segs Absolute 3.8 K/CU MM    Lymphocytes Absolute 1.5 K/CU MM    Monocytes Absolute 0.4 K/CU MM    Eosinophils Absolute 0.1 K/CU MM    Basophils Absolute 0.0 K/CU MM    Immature Neutrophil % 0.2 0 - 0.43 %    Total Immature Neutrophil 0.01 K/CU MM   Comprehensive Metabolic Panel w/ Reflex to MG   Result Value Ref Range    Sodium 138 135 - 145 MMOL/L    Potassium 3.8 3.5 - 5.1 MMOL/L    Chloride 95 (L) 99 - 110 mMol/L    CO2 28 21 - 32 MMOL/L    BUN 17 6 - 23 MG/DL    CREATININE 0.8 0.6 - 1.1 MG/DL    Glucose 106 (H) 70 - 99 MG/DL    Calcium 9.8 8.3 - 10.6 MG/DL    Albumin 4.5 3.4 - 5.0 GM/DL    Total Protein 7.4 6.4 - 8.2 GM/DL    Total Bilirubin 0.5 0.0 - 1.0 MG/DL    ALT 13 10 - 40 U/L    AST 24 15 - 37 IU/L    Alkaline Phosphatase 109 40 - 129 IU/L    GFR Non-African American >60 >60 mL/min/1.73m2    GFR African American >60 >60 mL/min/1.73m2    Anion Gap 15 4 - 16   Troponin   Result Value Ref Range    Troponin T <0.010 <0.01 NG/ML   Urinalysis with microscopic   Result Value Ref Range    Color, UA YELLOW YELLOW    Clarity, UA SLIGHTLY HAZY (A) CLEAR    Glucose, Urine NEGATIVE NEGATIVE MG/DL    Bilirubin Urine NEGATIVE NEGATIVE MG/DL    Ketones, Urine NEGATIVE NEGATIVE MG/DL    Specific Gravity, UA 1.010 1.001 - 1.035    Blood, Urine NEGATIVE NEGATIVE    pH, Urine 7.0 5.0 - 8.0    Protein, UA NEGATIVE NEGATIVE MG/DL    Urobilinogen, Urine 0.2 0.2 - 1.0 MG/DL    Nitrite Urine, Quantitative NEGATIVE NEGATIVE    Leukocyte Esterase, Urine 1+ (A) NEGATIVE    Volume, (UVOL) 12 10 - 12 ML    RBC, UA 3 TO 5 0 - 6 /HPF    WBC, UA 10 TO 25 0 - 5 /HPF    Epithelial Cells, UA 5 TO 10 /HPF    Cast Type NO CAST FORMS SEEN NO CAST FORMS SEEN /HPF    Bacteria, UA MANY (A) NEGATIVE /HPF    Crystal Type NONE SEEN NEGATIVE /HPF    Mucus, UA NEGATIVE NEGATIVE HPF   EKG 12 Lead   Result Value Ref Range    Ventricular Rate 84 BPM    Atrial Rate 833 BPM    QRS Duration 86 ms    Q-T Interval 394 ms    QTc Calculation (Bazett) 465 ms    R Axis -48 degrees    T Axis 46 degrees    Diagnosis       Accelerated Junctional rhythm  Left anterior fascicular block  Abnormal ECG  When compared with ECG of 26-AUG-2021 13:05,  Junctional rhythm has replaced Sinus rhythm  T wave inversion no longer evident in Inferior leads       CT HEAD WO CONTRAST    Result Date: 12/17/2021  EXAMINATION: CT OF THE HEAD WITHOUT CONTRAST  12/17/2021 3:45 pm TECHNIQUE: CT of the head was performed without the administration of intravenous contrast. Dose modulation, iterative reconstruction, and/or weight based adjustment of the mA/kV was utilized to reduce the radiation dose to as low as reasonably achievable. COMPARISON: 07/23/2021 HISTORY: ORDERING SYSTEM PROVIDED HISTORY: HA, dizziness, AMS TECHNOLOGIST PROVIDED HISTORY: Reason for exam:->HA, dizziness, AMS Has a \"code stroke\" or \"stroke alert\" been called? ->No Decision Support Exception - unselect if not a suspected or confirmed emergency medical condition->Emergency Medical Condition (MA) Reason for Exam: HA, dizziness, AMS Additional signs and symptoms: HA, dizziness, AMS Relevant Medical/Surgical History: HA, dizziness, AMS FINDINGS: BRAIN/VENTRICLES:  No acute loss of the gray-white matter differentiation is identified to suggest acute or subacute infarct. No masses or hemorrhages within the brain parenchyma are found. No evidence of midline shift. There is moderate periventricular low-attenuation, compatible with chronic small vessel ischemic disease. Moderate atrophy is noted, greater in the frontotemporal regions. The intracranial vasculature, including the dural venous sinuses, is within normal limits. ORBITS: No acute orbital abnormalities are identified. SINUSES: The visualized paranasal sinuses and mastoid air cells are clear. SOFT TISSUES/SKULL: The calvarium is intact. Extracranial soft tissues are unremarkable. No acute intracranial abnormality. Final ED Course and MDM: In brief, Gloria Cummings is a 80 y.o. female whose care was signed out to me by the outgoing provider. Patient doing well on my evaluation. Vital signs of been stable. Metabolic work-up here is largely unremarkable. CT head does not show any evidence of acute abnormality. Urinalysis is concerning for UTI. Patient started on Keflex and urine culture is sent. Patient appropriate for discharge home at this time with family who are agreeable with this plan of care. Discharged in good condition.     ED Medication Orders (From admission, onward)    Start Ordered     Status Ordering Provider    12/17/21 1945 12/17/21 1935  cephALEXin (KEFLEX) capsule 500 mg  ONCE Question:  Antimicrobial Indications  Answer:  Urinary Tract Infection    Ordered RUSS ARREDONDO    12/17/21 1745 12/17/21 1743  acetaminophen (TYLENOL) tablet 1,000 mg  ONCE         Last MAR action: Given - by Idalia Hathaway on 12/17/21 at 1757 RON ROCA          Final Impression      1.  Urinary tract infection without hematuria, site unspecified        DISPOSITION Decision To Discharge 12/17/2021 07:35:43 PM     (Please note that portions of this note may have been completed with a voice recognition program. Efforts were made to edit the dictations but occasionally words are mis-transcribed.)    Orma Phoenix, MD [address], MD  12/17/21 3207

## 2021-12-20 LAB
CULTURE: ABNORMAL
CULTURE: ABNORMAL
Lab: ABNORMAL
SPECIMEN: ABNORMAL

## 2021-12-22 ENCOUNTER — TELEPHONE (OUTPATIENT)
Dept: FAMILY MEDICINE CLINIC | Age: 86
End: 2021-12-22

## 2021-12-22 NOTE — TELEPHONE ENCOUNTER
Terrance from [facility] called and requested a prescription for a blood pressure machine, pulse oz, and a thermometer for the patient please advise. She would like the prescriptions faxed to her.   Fax number: [phone]

## 2021-12-22 NOTE — TELEPHONE ENCOUNTER
I am out of office this week, I have no way of signing the orders. Can we do a verbal order to whatever pharmacy she uses?

## 2021-12-29 DIAGNOSIS — K21.9 GASTROESOPHAGEAL REFLUX DISEASE WITHOUT ESOPHAGITIS: ICD-10-CM

## 2021-12-29 DIAGNOSIS — F51.01 PRIMARY INSOMNIA: ICD-10-CM

## 2021-12-29 RX ORDER — TRAZODONE HYDROCHLORIDE 50 MG/1
50 TABLET ORAL NIGHTLY
Qty: 90 TABLET | Refills: 3 | Status: SHIPPED | OUTPATIENT
Start: 2021-12-29

## 2021-12-29 RX ORDER — OMEPRAZOLE 20 MG/1
20 CAPSULE, DELAYED RELEASE ORAL DAILY
Qty: 90 CAPSULE | Refills: 3 | Status: SHIPPED | OUTPATIENT
Start: 2021-12-29 | End: 2022-03-24 | Stop reason: SDUPTHER

## 2022-01-11 ENCOUNTER — TELEPHONE (OUTPATIENT)
Dept: FAMILY MEDICINE CLINIC | Age: 87
End: 2022-01-11

## 2022-01-11 NOTE — TELEPHONE ENCOUNTER
Marty Gan from [address] [facility] called and left vm on MA line requesting v/o for homecare-PT.  Ok to give verbal. Please advise

## 2022-01-13 RX ORDER — MONTELUKAST SODIUM 10 MG/1
10 TABLET ORAL NIGHTLY
Qty: 90 TABLET | Refills: 3 | Status: SHIPPED | OUTPATIENT
Start: 2022-01-13

## 2022-01-25 ENCOUNTER — TELEPHONE (OUTPATIENT)
Dept: FAMILY MEDICINE CLINIC | Age: 87
End: 2022-01-25

## 2022-01-27 DIAGNOSIS — R14.0 ABDOMINAL DISTENSION: ICD-10-CM

## 2022-01-27 RX ORDER — FAMOTIDINE 40 MG/1
40 TABLET, FILM COATED ORAL NIGHTLY
Qty: 90 TABLET | Refills: 3 | Status: SHIPPED | OUTPATIENT
Start: 2022-01-27 | End: 2022-11-01

## 2022-02-09 ENCOUNTER — TELEPHONE (OUTPATIENT)
Dept: FAMILY MEDICINE CLINIC | Age: 87
End: 2022-02-09

## 2022-02-09 ENCOUNTER — HOSPITAL ENCOUNTER (OUTPATIENT)
Age: 87
Setting detail: SPECIMEN
Discharge: HOME | End: 2022-02-09
Payer: MEDICARE

## 2022-02-09 LAB
BACTERIA: ABNORMAL /HPF
BILIRUBIN URINE: NEGATIVE MG/DL
BLOOD, URINE: NEGATIVE
CAST TYPE: ABNORMAL /HPF
CLARITY: ABNORMAL
COLOR: YELLOW
COMMENT UA: ABNORMAL
CRYSTAL TYPE: NEGATIVE /HPF
EPITHELIAL CELLS, UA: NEGATIVE /HPF
GLUCOSE, URINE: NEGATIVE MG/DL
KETONES, URINE: NEGATIVE MG/DL
LEUKOCYTE ESTERASE, URINE: ABNORMAL
NITRITE URINE, QUANTITATIVE: NEGATIVE
PH, URINE: 7 (ref 5–8)
PROTEIN UA: NEGATIVE MG/DL
RBC URINE: ABNORMAL /HPF (ref 0–6)
SPECIFIC GRAVITY UA: 1.01 (ref 1–1.03)
UROBILINOGEN, URINE: 0.2 MG/DL (ref 0.2–1)
WBC UA: ABNORMAL /HPF (ref 0–5)

## 2022-02-09 PROCEDURE — 87186 SC STD MICRODIL/AGAR DIL: CPT

## 2022-02-09 PROCEDURE — 87086 URINE CULTURE/COLONY COUNT: CPT

## 2022-02-09 PROCEDURE — 81001 URINALYSIS AUTO W/SCOPE: CPT

## 2022-02-09 PROCEDURE — 87077 CULTURE AEROBIC IDENTIFY: CPT

## 2022-02-09 NOTE — TELEPHONE ENCOUNTER
C called stating pt is having increased confusion and incontinence and would like a verbal for UA with reflex test nurse is seeing pt today around 12pm. Please advise

## 2022-02-10 RX ORDER — NITROFURANTOIN 25; 75 MG/1; MG/1
100 CAPSULE ORAL 2 TIMES DAILY
Qty: 14 CAPSULE | Refills: 0 | Status: SHIPPED | OUTPATIENT
Start: 2022-02-10 | End: 2022-02-11 | Stop reason: ALTCHOICE

## 2022-02-11 LAB
CULTURE: ABNORMAL
CULTURE: ABNORMAL
Lab: ABNORMAL
SPECIMEN: ABNORMAL

## 2022-02-11 RX ORDER — CIPROFLOXACIN 250 MG/1
250 TABLET, FILM COATED ORAL 2 TIMES DAILY
Qty: 6 TABLET | Refills: 0 | Status: SHIPPED | OUTPATIENT
Start: 2022-02-11 | End: 2022-02-14

## 2022-03-14 ENCOUNTER — TELEPHONE (OUTPATIENT)
Dept: FAMILY MEDICINE CLINIC | Age: 87
End: 2022-03-14

## 2022-03-14 NOTE — TELEPHONE ENCOUNTER
Called and spoke to patients daughter she stated it takes her mother a while to respond when they speak to her or ask a question. Daughter stated she refuses to go to the ER.  She is only having issues with incontinence

## 2022-03-14 NOTE — TELEPHONE ENCOUNTER
If pt is incoherent she should be evaluated immediately. It is possible for UTI but also other issues. Is she just having a hard time speaking or is she confused?

## 2022-03-14 NOTE — TELEPHONE ENCOUNTER
Called and spoke to daughter Maria De Jesus Romero she stated the patient is incoherent has a sore throat sounds hoarse, has a slight cough started last Thursday. Denies running a fever or any other symptoms they did a home Covid test it was negative. Patient is incontinent does not know when she has to urinate that is why the daughter is wanting to bring a urine sample to the office.  Please advise

## 2022-03-14 NOTE — TELEPHONE ENCOUNTER
Cough and hoarse voice but is Daughter believes pt. Has UTI did  a cup. I asked daughter what made her think pt. Has a UTI. She stated just the way she is acting. Pt. Will not let Daughter know when she need to go to the bathroom. Pt. Is wearing depends. Daughter states pt. Usually will not tell her when something is wrong because she is afraid she is going to take her to the doctor. If you like I can schedule a VV for tomorrow.

## 2022-03-14 NOTE — TELEPHONE ENCOUNTER
----- Message from Olancha sent at 3/14/2022  7:54 AM EDT -----  Subject: Appointment Request    Reason for Call: Semi-Routine Cough, Cold Symptoms    QUESTIONS  Type of Appointment? Established Patient  Reason for appointment request? No appointments available during search  Additional Information for Provider? Pt daughter called to get pt in or do   a VV. Pt has a cough and UTI. Please call pt daughter back to be seen   today.   ---------------------------------------------------------------------------  --------------  CALL BACK INFO  What is the best way for the office to contact you? OK to leave message on   voicemail  Preferred Call Back Phone Number? [phone]  ---------------------------------------------------------------------------  --------------  SCRIPT ANSWERS  Relationship to Patient? Other  Representative Name? rosalia-daughter  Additional information verified (besides Name and Date of Birth)? Phone   Number  Are you currently unable to finish sentences due to any difficulty   breathing? No  Are you unable to swallow liquids? No  Are you having fevers (100.4 or greater), chills, or sweats? No  Do you have COPD, asthma or a chronic lung condition? No  Have your symptoms been present for more than 5 days? No  Have you recently (14 days) been seen by a provider for this issue? No  Have you been diagnosed with, awaiting test results for, or told that you   are suspected of having COVID-19 (Coronavirus)? (If patient has tested   negative or was tested as a requirement for work, school, or travel and   not based on symptoms, answer no)? No  Within the past 10 days have you developed any of the following symptoms   (answer no if symptoms have been present longer than 10 days or began   more than 10 days ago)?  Fever or Chills, Cough, Shortness of breath or   difficulty breathing, Loss of taste or smell, Sore throat, Nasal   congestion, Sneezing or runny nose, Fatigue or generalized body aches (answer no if pain is specific to a body part e.g. back pain), Diarrhea,   Headache?  Yes

## 2022-03-15 DIAGNOSIS — R32 URINARY INCONTINENCE, UNSPECIFIED TYPE: Primary | ICD-10-CM

## 2022-03-15 LAB
BILIRUBIN, POC: NEGATIVE
BLOOD URINE, POC: ABNORMAL
CLARITY, POC: ABNORMAL
COLOR, POC: YELLOW
GLUCOSE URINE, POC: NEGATIVE
KETONES, POC: NEGATIVE
LEUKOCYTE EST, POC: ABNORMAL
NITRITE, POC: NEGATIVE
PH, POC: 6.5
PROTEIN, POC: NEGATIVE
SPECIFIC GRAVITY, POC: 1.02
UROBILINOGEN, POC: ABNORMAL

## 2022-03-15 PROCEDURE — 81002 URINALYSIS NONAUTO W/O SCOPE: CPT | Performed by: PHYSICIAN ASSISTANT

## 2022-03-17 LAB
ORGANISM: ABNORMAL
URINE CULTURE, ROUTINE: ABNORMAL

## 2022-03-17 RX ORDER — NITROFURANTOIN 25; 75 MG/1; MG/1
100 CAPSULE ORAL 2 TIMES DAILY
Qty: 14 CAPSULE | Refills: 0 | Status: SHIPPED | OUTPATIENT
Start: 2022-03-17 | End: 2022-07-15 | Stop reason: SDUPTHER

## 2022-03-18 ENCOUNTER — TELEPHONE (OUTPATIENT)
Dept: FAMILY MEDICINE CLINIC | Age: 87
End: 2022-03-18

## 2022-03-18 NOTE — TELEPHONE ENCOUNTER
[phone] Riya Hendrickson from [facility] called. Pt is due for recert. Need a Ok that [facility] will continue with orders.

## 2022-03-22 ENCOUNTER — OFFICE VISIT (OUTPATIENT)
Dept: FAMILY MEDICINE CLINIC | Age: 87
End: 2022-03-22
Payer: MEDICARE

## 2022-03-22 VITALS
BODY MASS INDEX: 24.61 KG/M2 | SYSTOLIC BLOOD PRESSURE: 108 MMHG | WEIGHT: 126 LBS | HEART RATE: 62 BPM | DIASTOLIC BLOOD PRESSURE: 60 MMHG | TEMPERATURE: 96.8 F | RESPIRATION RATE: 18 BRPM

## 2022-03-22 DIAGNOSIS — R60.9 DEPENDENT EDEMA: ICD-10-CM

## 2022-03-22 DIAGNOSIS — I10 ESSENTIAL HYPERTENSION: Chronic | ICD-10-CM

## 2022-03-22 DIAGNOSIS — I50.32 CHRONIC DIASTOLIC CONGESTIVE HEART FAILURE (HCC): Chronic | ICD-10-CM

## 2022-03-22 DIAGNOSIS — F33.41 MAJOR DEPRESSIVE DISORDER, RECURRENT EPISODE, IN PARTIAL REMISSION (HCC): Chronic | ICD-10-CM

## 2022-03-22 DIAGNOSIS — R41.3 MEMORY DIFFICULTIES: ICD-10-CM

## 2022-03-22 DIAGNOSIS — E11.9 TYPE 2 DIABETES MELLITUS WITHOUT COMPLICATION, WITHOUT LONG-TERM CURRENT USE OF INSULIN (HCC): Primary | ICD-10-CM

## 2022-03-22 DIAGNOSIS — K59.04 CHRONIC IDIOPATHIC CONSTIPATION: ICD-10-CM

## 2022-03-22 LAB — HBA1C MFR BLD: 5.6 %

## 2022-03-22 PROCEDURE — 3044F HG A1C LEVEL LT 7.0%: CPT | Performed by: PHYSICIAN ASSISTANT

## 2022-03-22 PROCEDURE — 83036 HEMOGLOBIN GLYCOSYLATED A1C: CPT | Performed by: PHYSICIAN ASSISTANT

## 2022-03-22 PROCEDURE — 4040F PNEUMOC VAC/ADMIN/RCVD: CPT | Performed by: PHYSICIAN ASSISTANT

## 2022-03-22 PROCEDURE — G8484 FLU IMMUNIZE NO ADMIN: HCPCS | Performed by: PHYSICIAN ASSISTANT

## 2022-03-22 PROCEDURE — G8427 DOCREV CUR MEDS BY ELIG CLIN: HCPCS | Performed by: PHYSICIAN ASSISTANT

## 2022-03-22 PROCEDURE — 1036F TOBACCO NON-USER: CPT | Performed by: PHYSICIAN ASSISTANT

## 2022-03-22 PROCEDURE — 99214 OFFICE O/P EST MOD 30 MIN: CPT | Performed by: PHYSICIAN ASSISTANT

## 2022-03-22 PROCEDURE — G8420 CALC BMI NORM PARAMETERS: HCPCS | Performed by: PHYSICIAN ASSISTANT

## 2022-03-22 PROCEDURE — 1123F ACP DISCUSS/DSCN MKR DOCD: CPT | Performed by: PHYSICIAN ASSISTANT

## 2022-03-22 PROCEDURE — 1090F PRES/ABSN URINE INCON ASSESS: CPT | Performed by: PHYSICIAN ASSISTANT

## 2022-03-22 RX ORDER — FUROSEMIDE 40 MG/1
40 TABLET ORAL DAILY
Qty: 90 TABLET | Refills: 3 | Status: SHIPPED | OUTPATIENT
Start: 2022-03-22

## 2022-03-22 RX ORDER — POTASSIUM CHLORIDE 20 MEQ/1
20 TABLET, EXTENDED RELEASE ORAL DAILY
Qty: 90 TABLET | Refills: 3 | Status: SHIPPED | OUTPATIENT
Start: 2022-03-22 | End: 2022-03-30

## 2022-03-22 RX ORDER — VENLAFAXINE HYDROCHLORIDE 75 MG/1
75 CAPSULE, EXTENDED RELEASE ORAL DAILY
Qty: 90 CAPSULE | Refills: 3 | Status: SHIPPED | OUTPATIENT
Start: 2022-03-22

## 2022-03-22 RX ORDER — DOCUSATE SODIUM 100 MG/1
CAPSULE, LIQUID FILLED ORAL
Qty: 180 CAPSULE | Refills: 3 | Status: SHIPPED | OUTPATIENT
Start: 2022-03-22

## 2022-03-22 ASSESSMENT — PATIENT HEALTH QUESTIONNAIRE - PHQ9
SUM OF ALL RESPONSES TO PHQ QUESTIONS 1-9: 0
SUM OF ALL RESPONSES TO PHQ QUESTIONS 1-9: 0
2. FEELING DOWN, DEPRESSED OR HOPELESS: 0
SUM OF ALL RESPONSES TO PHQ9 QUESTIONS 1 & 2: 0
SUM OF ALL RESPONSES TO PHQ QUESTIONS 1-9: 0
SUM OF ALL RESPONSES TO PHQ QUESTIONS 1-9: 0
1. LITTLE INTEREST OR PLEASURE IN DOING THINGS: 0

## 2022-03-22 ASSESSMENT — ENCOUNTER SYMPTOMS
COUGH: 0
BACK PAIN: 1
SHORTNESS OF BREATH: 0
ABDOMINAL PAIN: 0

## 2022-03-22 NOTE — ASSESSMENT & PLAN NOTE
Discussed referral to cardiology or repeat echo now.  Pt and daughter decline at this time, will revisit at next appt

## 2022-03-22 NOTE — PROGRESS NOTES
Fouzia Deng  8/3/1929  80 y.o.  female    SUBJECTIVE:    Chief Complaint   Patient presents with    6 Month Follow-Up       HPI   Pt here today for routine f/u. Recent changes in mentation noted and pt was positive for UTI, finishing macrobid and acute changes resolved per daughter. She is currently following with Dr. Camelia Luciano for dementia. Next appt is May 19, 2022. Currently taking namenda but daughter does not notice much improvement since starting medication. Diastolic dysfunction-daughter has some questions regarding diagnosis, states she is unaware of this. Pt initially diagnosed back in 2013 upon review of chart, last echo 2018:   Technically Difficult Study. Mild concentric LVH with normal systolic function. EF is 50-55 %   Impaired relaxation compatible with diastolic dysfunction. The left atrium is Mildly dilated. Mitral and aortic valve sclerosis without stenosis. No evidence of pericardial effusion. Has not followed with cardiology, well managed by previous provider and cardiology referral declined in past by this PCP. Pt is compliant with lasix/K supplement (hx chronic hypokalemia). Pt and daughter decline referral/repeat echo at this time. DM-well controlled with lifestyle modification at this time, A1C today 5.6      Depression-well controlled with current medication. Dependent edema-uses lasix with good results, worse when not keeping legs elevated. No cough/sob/neal at this time. HTN-The patient is taking hypertensive medications compliantly without side effects. Denies chest pain, dyspnea, edema, or TIA's.       PHQ Scores 3/22/2022 10/22/2021 10/7/2021 8/27/2021 7/8/2021 6/29/2021 9/25/2020   PHQ2 Score 0 0 1 0 5 0 0   PHQ9 Score 0 0 1 0 10 0 0     Interpretation of Total Score Depression Severity: 1-4 = Minimal depression, 5-9 = Mild depression, 10-14 = Moderate depression, 15-19 = Moderately severe depression, 20-27 = Severe depression     Current SHOULDER ARTHROSCOPY WITH ROTATOR CUFF REPAIR  Procedure Report    Patient Name:  Jovany Taveras  YOB: 1967    Date of Surgery:  2/19/2021     Indications: This is a 53 y.o. male with pain to the left shoulder after a skiing accident.  Imaging demonstrated rotator cuff tear with anterior labral tear consistent with dislocation.Treatment options were discussed.  They desired to proceed with shoulder arthroscopy with rotator cuff repair and possible labral repair after discussing the risks including bleeding, scarring, infection, stiffness, nerve damage, tendon damage, artery damage, continued pain, DVT, loss of life or limb, and a need for further surgery.      Pre-op Diagnosis:   Left shoulder full-thickness supraspinatus tear and labral tear       Post-op Diagnosis:    Same    Procedure/CPT® Codes: [id number]    Procedure(s):  SHOULDER ARTHROSCOPY, BANKART REPAIR AND ROTATOR CUFF REPAIR    Assistant: Carmen Rodriguez RN    was responsible for performing the following activities: Retraction, Suction, Irrigation, Suturing, Closing and Placing Dressing and their skilled assistance was necessary for the success of this case.         Anesthesia: General with Block    IV fluids: See anesthesia record    Estimated Blood Loss: minimal    Implants:    Implant Name Type Inv. Item Serial No.  Lot No. LRB No. Used Action   SUT FW 2/0 38IN 1BLU 1BLK/WHT  [id number] - [id number] Implant SUT FW 2/0 38IN 1BLU 1BLK/WHT  [id number]  [employer] . Left 1 Implanted   SUT/ANCH BIOCOMP PUSH/LK 2.9X12.5MM - [id number] Implant SUT/ANCH BIOCOMP PUSH/LK 2.9X12.5MM  [employer] [id number] Left 2 Implanted   SUT LP NONABS HIFI NMBR2 - [id number] Implant SUT LP NONABS HIFI NMBR2  [employer] [id number] Left 1 Implanted   SUT/ANCH KNOTLSS CROSSFT BIOCOMP ROW MEDL W/COBR/TPE 4.75MM - [id number] Implant SUT/ANCH KNOTLSS CROSSFT BIOCOMP ROW MEDL W/COBR/TPE 4.75MM  [employer] [id number] Left 1 Implanted   SUT/ANCH KNOTLSS CROSSFT BIOCOMP ROW  MEDL W/TPE JULIAN 4.75MM - [id number] Implant SUT/ANCH KNOTLSS CROSSFT BIOCOMP ROW MEDL W/TPE JULIAN 4.75MM  [employer] [id number] Left 1 Implanted   SUT LP NONABS HIFI NMBR2 - [id number] Implant SUT LP NONABS HIFI NMBR2  [employer] [id number] Left 2 Implanted   SUT/ANCH SHLDR KNOTLSS BIOCOMP 4.75MM - [id number] Implant SUT/ANCH SHLDR KNOTLSS BIOCOMP 4.75MM  [employer] [id number] Left 1 Implanted   SUT/ANCH SHLDR KNOTLSS BIOCOMP 5.5MM - [id number] Implant SUT/ANCH SHLDR KNOTLSS BIOCOMP 5.5MM  [employer] [id number] Left 1 Implanted         Complications: None    Specimens:none    Description of Procedure: The patient's operative site was marked.  Regional anesthesia was administered.  They were brought to the operating room and placed  on the operating room table.  General anesthesia was administered. Antibiotics were dosed.  A timeout was taken, confirming the correct operative site and procedure.  Exam under anesthesia indicated mild loss of forward elevation which was corrected with a gentle manipulation and 1+ anterior instability.  They were placed in a semilateral position.  An axillary roll and SCDs were placed.  The left shoulder was prepped and draped in the standard surgical fashion.  The shoulder was injected with local anesthetic and was placed into traction.  A posterior portal was created.  A camera was inserted.  The glenoid chondral surface was intact.  The humerus surface was intact.  The subscapularis was intact.  The biceps was intact.  The labrum was torn over the anterior-inferior aspect, no chondral loss of the glenoid.  The undersurface of the cuff demonstrated full-thickness supraspinatus tear. A shaver was inserted.  The labrum probed and found to be unstable anteriorly.  Remainder the labrum was intact.  The biceps was pulled into the shoulder and found to be intact.  The axillary pouch was free of synovitis or loose bodies.    Anterior-inferior labral tissue was then prepared with a gentle rasp to allow  Outpatient Medications on File Prior to Visit   Medication Sig Dispense Refill    nitrofurantoin, macrocrystal-monohydrate, (MACROBID) 100 MG capsule Take 1 capsule by mouth 2 times daily for 7 days 14 capsule 0    famotidine (PEPCID) 40 MG tablet Take 1 tablet by mouth nightly 90 tablet 3    montelukast (SINGULAIR) 10 MG tablet TAKE 1 TABLET BY MOUTH NIGHTLY 90 tablet 3    omeprazole (PRILOSEC) 20 MG delayed release capsule TAKE 1 CAPSULE BY MOUTH DAILY 90 capsule 3    traZODone (DESYREL) 50 MG tablet TAKE 1 TABLET BY MOUTH NIGHTLY 90 tablet 3    UNABLE TO FIND One oral thermometer 1 Units 0    UNABLE TO FIND Pulse ox 1 Units 0    UNABLE TO FIND Automatic blood pressure machine 1 Units 0    allopurinol (ZYLOPRIM) 100 MG tablet TAKE 1 TABLET BY MOUTH DAILY 90 tablet 3    UNABLE TO FIND Left chair 1 Units 0    levothyroxine (SYNTHROID) 100 MCG tablet TAKE 1 TABLET BY MOUTH IN THE MORNING ON AN EMPTY STOMACH. 90 tablet 3    meclizine (ANTIVERT) 25 MG tablet Take 1 tablet by mouth 3 times daily as needed for Dizziness 90 tablet 5    UNABLE TO FIND rollator walker with seat and brakes 1 Units 0    albuterol sulfate HFA (VENTOLIN HFA) 108 (90 Base) MCG/ACT inhaler Inhale 2 puffs into the lungs every 6 hours as needed for Wheezing 3 Inhaler 3    olopatadine (PATADAY) 0.2 % SOLN ophthalmic solution   5    UNABLE TO FIND Provide one electronic blood pressure cuff for home monitoring 1 each 0    Blood Glucose Monitoring Suppl (EMBRACE BLOOD GLUCOSE MONITOR) STEWART Use as directed for daily blood sugar testing 2 Device 3    blood glucose test strips (EMBRACE BLOOD GLUCOSE TEST) strip 1 each by In Vitro route daily As needed. 100 each 3    acetaminophen (TYLENOL) 650 MG CR tablet Take 1 tablet by mouth every 8 hours as needed for Pain 90 tablet 3    Misc.  Devices (TUB TRANSFER BOARD) MISC Use as directed 1 each 0    aspirin EC 81 MG EC tablet Take 81 mg by mouth daily  30 tablet 5    [DISCONTINUED] docusate sodium (DOK) 100 MG capsule TAKE ONE CAPSULE TWO TIMES A DAY AS NEEDED FOR CONSTIPATION 60 capsule 5     No current facility-administered medications on file prior to visit. Allergies   Allergen Reactions    Codeine Other (See Comments)     Gets \"jittery feeling\"       Past Medical History:   Diagnosis Date    ACP (advance care planning) 8/10/2020    Acute cystitis without hematuria 7/8/2021    Allergic rhinitis     Anxiety     Arthritis     Bradycardia     Cellulitis, leg 1/8/2012    Dementia ([facility] [address]     Depression     Diabetes mellitus (HCC)     sugars controlled off meds    Diabetic eye exam ([facility] [address] 1/28/16    no retinopathy    Dysuria 9/28/2020    Flu vaccine need 9/28/2020    Gout     Hyperlipidemia     Hypertension     Hypothyroidism        Past Surgical History:   Procedure Laterality Date    APPENDECTOMY  13 yrs ago    CARPAL TUNNEL RELEASE      bilateral    COLONOSCOPY      ENDOSCOPY, COLON, DIAGNOSTIC  7/8/13    gastritis, hiatal hernia    EYE SURGERY      both eyes    HERNIA REPAIR      umbilical    HIP SURGERY Left 08/22/2016    ORIF    HYSTERECTOMY      complete    JOINT REPLACEMENT      left knee    THYROIDECTOMY, PARTIAL      ULNAR TUNNEL RELEASE      bilateral       Social History     Socioeconomic History    Marital status:      Spouse name: None    Number of children: None    Years of education: None    Highest education level: None   Occupational History    None   Tobacco Use    Smoking status: Never Smoker    Smokeless tobacco: Never Used   Vaping Use    Vaping Use: Never used   Substance and Sexual Activity    Alcohol use:  Yes     Alcohol/week: 1.0 standard drink     Types: 1 Cans of beer per week     Comment: may be less than once weekly    Drug use: No    Sexual activity: Yes     Partners: Male     Comment: states sometimes   Other Topics Concern    None   Social History Narrative    None     Social Determinants of Health     Financial to be slightly shifted superiorly.  Bone was prepared with a rasp to bleeding surface and two #2 FiberWire's were placed into the labrum and attached to their respective anchor which was placed into the socket just above the area of passage to affect a slight capsular shift and inserted restoring the labrum to its position     The instruments were placed in the subacromial space.  A full-thickness bursectomy was performed.  The CA ligament was intact.  No impingement was noted.  An accessory portal was created. Tear was visualized and prepared.  It was a 2.5 slightly thinned crescent-shaped tear.  The tuberosity was skeletonized to bleeding bone.  A microfracture was performed.  Two medial row anchors were placed as well as 2 FiberLink sutures which were passed through the tendon, crisscrossed, and secured to lateral row of fixation, restoring the tendon to its footprint.  The instruments were removed.  The wounds were closed with suture and Steri-Strips.    30 mg of Toradol and lidocaine were injected into the deltoid  and a sterile dressing was applied to the rest of the shoulder.  They were placed in a sling and taken to the recovery room.  There were no complications.  I was present for all portions.  All counts were correct.  Good capillary refill was noted to the hand.      Wan Mccormick MD     Date: 2/19/2021  Time: 10:54 EST         when sitting for long period of time, compression stockings if able          Relevant Medications    furosemide (LASIX) 40 MG tablet               Return in about 3 months (around 6/22/2022) for medicare wellness.

## 2022-03-23 ENCOUNTER — TELEPHONE (OUTPATIENT)
Dept: FAMILY MEDICINE CLINIC | Age: 87
End: 2022-03-23

## 2022-03-23 DIAGNOSIS — K21.9 GASTROESOPHAGEAL REFLUX DISEASE WITHOUT ESOPHAGITIS: ICD-10-CM

## 2022-03-24 RX ORDER — OMEPRAZOLE 20 MG/1
20 CAPSULE, DELAYED RELEASE ORAL DAILY
Qty: 90 CAPSULE | Refills: 3 | Status: SHIPPED | OUTPATIENT
Start: 2022-03-24

## 2022-03-30 RX ORDER — POTASSIUM CHLORIDE 20 MEQ/1
20 TABLET, EXTENDED RELEASE ORAL DAILY
Qty: 30 TABLET | Refills: 5 | Status: SHIPPED | OUTPATIENT
Start: 2022-03-30

## 2022-04-01 ENCOUNTER — TELEPHONE (OUTPATIENT)
Dept: FAMILY MEDICINE CLINIC | Age: 87
End: 2022-04-01

## 2022-04-13 ENCOUNTER — TELEPHONE (OUTPATIENT)
Dept: FAMILY MEDICINE CLINIC | Age: 87
End: 2022-04-13

## 2022-04-13 NOTE — TELEPHONE ENCOUNTER
Home nurse called Family would like to bring in urine sample to make sure urinary tract infection Is gone

## 2022-04-14 NOTE — TELEPHONE ENCOUNTER
Called and spoke with ciro the home care nurse-orders given patient will have family ring the urine to the office to check u/a

## 2022-04-19 DIAGNOSIS — Z87.440 HISTORY OF UTI: Primary | ICD-10-CM

## 2022-04-19 DIAGNOSIS — R30.0 DYSURIA: ICD-10-CM

## 2022-04-19 LAB
BILIRUBIN, POC: ABNORMAL
BLOOD URINE, POC: ABNORMAL
CLARITY, POC: CLEAR
COLOR, POC: ABNORMAL
GLUCOSE URINE, POC: ABNORMAL
KETONES, POC: ABNORMAL
LEUKOCYTE EST, POC: ABNORMAL
NITRITE, POC: ABNORMAL
PH, POC: 6
PROTEIN, POC: ABNORMAL
SPECIFIC GRAVITY, POC: 1.02
UROBILINOGEN, POC: 0.2

## 2022-04-19 PROCEDURE — 81002 URINALYSIS NONAUTO W/O SCOPE: CPT | Performed by: PHYSICIAN ASSISTANT

## 2022-04-21 LAB
ORGANISM: ABNORMAL
URINE CULTURE, ROUTINE: ABNORMAL

## 2022-04-21 RX ORDER — NITROFURANTOIN 25; 75 MG/1; MG/1
100 CAPSULE ORAL 2 TIMES DAILY
Qty: 14 CAPSULE | Refills: 0 | Status: SHIPPED | OUTPATIENT
Start: 2022-04-21 | End: 2022-04-28

## 2022-05-03 ENCOUNTER — HOSPITAL ENCOUNTER (OUTPATIENT)
Dept: ULTRASOUND IMAGING | Age: 87
Discharge: HOME | End: 2022-05-03
Payer: MEDICARE

## 2022-05-03 DIAGNOSIS — F03.90 SENILE DEMENTIA, UNCOMPLICATED (HCC): ICD-10-CM

## 2022-05-03 DIAGNOSIS — R26.89 IMBALANCE: ICD-10-CM

## 2022-05-03 PROCEDURE — 93880 EXTRACRANIAL BILAT STUDY: CPT

## 2022-05-06 DIAGNOSIS — R42 VERTIGO: ICD-10-CM

## 2022-05-09 RX ORDER — MECLIZINE HYDROCHLORIDE 25 MG/1
25 TABLET ORAL 3 TIMES DAILY PRN
Qty: 90 TABLET | Refills: 0 | Status: SHIPPED | OUTPATIENT
Start: 2022-05-09 | End: 2022-07-20

## 2022-05-11 ENCOUNTER — TELEPHONE (OUTPATIENT)
Dept: FAMILY MEDICINE CLINIC | Age: 87
End: 2022-05-11

## 2022-05-11 DIAGNOSIS — E03.9 ACQUIRED HYPOTHYROIDISM: Chronic | ICD-10-CM

## 2022-05-11 RX ORDER — LEVOTHYROXINE SODIUM 0.1 MG/1
TABLET ORAL
Qty: 90 TABLET | Refills: 0 | Status: SHIPPED | OUTPATIENT
Start: 2022-05-11

## 2022-05-11 NOTE — TELEPHONE ENCOUNTER
Will likely need to get this from Dr. Marge layton. She is taking care of home care orders while PCP is out.

## 2022-05-16 ENCOUNTER — TELEPHONE (OUTPATIENT)
Dept: FAMILY MEDICINE CLINIC | Age: 87
End: 2022-05-16

## 2022-05-18 ENCOUNTER — TELEPHONE (OUTPATIENT)
Dept: FAMILY MEDICINE CLINIC | Age: 87
End: 2022-05-18

## 2022-05-18 NOTE — TELEPHONE ENCOUNTER
Spoke with [facility] home care nurse and she advised that pt is having pitting edema in bilateral feet. States that her daughter states that since the weather has been warmer pt is having more swelling. Elbert Sandysavannah states that pt is leaving for vacation with her daughter tomorrow and won't return until around Alpha day. States that she wanted to see if there was a medication that can be called in to help. Reports that pt use to be on Spirolactone  PRN. States that she told pt to reduce sodium and to keep her feet elevated,. Reports that pt takes Lasix 20mg q am  And does report that pt does have about 10 pills left of the spirolactone 25 mg. Please advise.   Pt uses the [facility]

## 2022-05-18 NOTE — TELEPHONE ENCOUNTER
Patient is supposed to be on lasix 40 mg daily. Please verify that dose is correct. If not, may need to send refill of correct dose. Should also be taking potassium supplement with this. Ideally, she needs to be seen and evaluated--does she have a cardiologist? Any SOB or wheezing? Any chest pain?

## 2022-05-19 NOTE — TELEPHONE ENCOUNTER
Ideally patient needs to be evaluated due to her swelling prior to her leaving for vacation. She needs to continue the lasix and potassium supplement. Remain active and wear compression stockings.

## 2022-05-19 NOTE — TELEPHONE ENCOUNTER
Called patient and spoke with her Matt Dennis . Rachel Abdalla states they are leaving to go out of state for vacation tonight. Rachel Abdalla believes patient is on Lasix 20 MG daily, patient takes potassium supplement and patient doesn't have a cardiologist.  Rachel Abdalla states patient is at doctor appointment with the list of medications she is taking.  Please advise

## 2022-05-20 NOTE — TELEPHONE ENCOUNTER
Called patient and spoke with her daughter Rocky Shepard. Reviewed Uriel ESTEBAN-MYRA recommendations. Patient is with daughter Rocky Shepard on vacation.   Scheduled appointment with Joy Gomez on 6/1/2022 at 11:00 am for patient to be seen when returns from vacation

## 2022-06-01 ENCOUNTER — OFFICE VISIT (OUTPATIENT)
Dept: FAMILY MEDICINE CLINIC | Age: 87
End: 2022-06-01
Payer: MEDICARE

## 2022-06-01 VITALS
WEIGHT: 135 LBS | SYSTOLIC BLOOD PRESSURE: 110 MMHG | BODY MASS INDEX: 26.37 KG/M2 | TEMPERATURE: 98.2 F | OXYGEN SATURATION: 97 % | DIASTOLIC BLOOD PRESSURE: 60 MMHG | HEART RATE: 70 BPM | RESPIRATION RATE: 16 BRPM

## 2022-06-01 DIAGNOSIS — R60.0 LEG EDEMA: Primary | ICD-10-CM

## 2022-06-01 PROCEDURE — G8417 CALC BMI ABV UP PARAM F/U: HCPCS | Performed by: NURSE PRACTITIONER

## 2022-06-01 PROCEDURE — 1090F PRES/ABSN URINE INCON ASSESS: CPT | Performed by: NURSE PRACTITIONER

## 2022-06-01 PROCEDURE — G8427 DOCREV CUR MEDS BY ELIG CLIN: HCPCS | Performed by: NURSE PRACTITIONER

## 2022-06-01 PROCEDURE — 1123F ACP DISCUSS/DSCN MKR DOCD: CPT | Performed by: NURSE PRACTITIONER

## 2022-06-01 PROCEDURE — 99213 OFFICE O/P EST LOW 20 MIN: CPT | Performed by: NURSE PRACTITIONER

## 2022-06-01 PROCEDURE — 1036F TOBACCO NON-USER: CPT | Performed by: NURSE PRACTITIONER

## 2022-06-01 RX ORDER — SPIRONOLACTONE 25 MG/1
12.5 TABLET ORAL DAILY
Qty: 45 TABLET | Refills: 1 | Status: SHIPPED | OUTPATIENT
Start: 2022-06-01 | End: 2022-08-18

## 2022-06-01 ASSESSMENT — PATIENT HEALTH QUESTIONNAIRE - PHQ9
SUM OF ALL RESPONSES TO PHQ QUESTIONS 1-9: 3
8. MOVING OR SPEAKING SO SLOWLY THAT OTHER PEOPLE COULD HAVE NOTICED. OR THE OPPOSITE, BEING SO FIGETY OR RESTLESS THAT YOU HAVE BEEN MOVING AROUND A LOT MORE THAN USUAL: 0
1. LITTLE INTEREST OR PLEASURE IN DOING THINGS: 0
SUM OF ALL RESPONSES TO PHQ QUESTIONS 1-9: 3
7. TROUBLE CONCENTRATING ON THINGS, SUCH AS READING THE NEWSPAPER OR WATCHING TELEVISION: 0
6. FEELING BAD ABOUT YOURSELF - OR THAT YOU ARE A FAILURE OR HAVE LET YOURSELF OR YOUR FAMILY DOWN: 0
2. FEELING DOWN, DEPRESSED OR HOPELESS: 0
5. POOR APPETITE OR OVEREATING: 0
10. IF YOU CHECKED OFF ANY PROBLEMS, HOW DIFFICULT HAVE THESE PROBLEMS MADE IT FOR YOU TO DO YOUR WORK, TAKE CARE OF THINGS AT HOME, OR GET ALONG WITH OTHER PEOPLE: 1
SUM OF ALL RESPONSES TO PHQ9 QUESTIONS 1 & 2: 0
3. TROUBLE FALLING OR STAYING ASLEEP: 3
4. FEELING TIRED OR HAVING LITTLE ENERGY: 0
9. THOUGHTS THAT YOU WOULD BE BETTER OFF DEAD, OR OF HURTING YOURSELF: 0

## 2022-06-01 ASSESSMENT — ENCOUNTER SYMPTOMS
GASTROINTESTINAL NEGATIVE: 1
WHEEZING: 0
COUGH: 0
SHORTNESS OF BREATH: 0
RESPIRATORY NEGATIVE: 1

## 2022-06-01 NOTE — PROGRESS NOTES
terell Sebastian (:  8/3/1929) is a 80 y.o. female,Established patient, here for evaluation of the following chief complaint(s):  Follow-up (follow up on edema in bilateral legs. swelling has improved )         ASSESSMENT/PLAN:  1. Leg edema  -     spironolactone (ALDACTONE) 25 MG tablet; Take 0.5 tablets by mouth daily If needed for increased swelling in feet/ankles, Disp-45 tablet, R-1Normal    Use PRN and FU in six weeks. If persisting or worsening needs sooner FU. Monitor for ShOB, LONG, other concerns. Return in about 6 weeks (around 2022) for With primary care provider. Subjective   SUBJECTIVE/OBJECTIVE:  HPI    has been having swelling in her legs prior to vacation. , okay today. Right one has had some swelling today. Gets better overnight. Worsens during the day. Started to get more problematic once weather got warm. No ShOB, no chest pain, no exertional dyspnea. Review of Systems   Constitutional: Negative. Negative for chills, diaphoresis and fever. Respiratory: Negative. Negative for cough, shortness of breath and wheezing. Cardiovascular: Negative. Negative for chest pain, palpitations and leg swelling. Gastrointestinal: Negative. Endocrine: Negative. Musculoskeletal: Negative. Neurological: Negative. Psychiatric/Behavioral: Negative. All other systems reviewed and are negative. Objective   Physical Exam  Constitutional:       Appearance: Normal appearance. She is not ill-appearing. Eyes:      Extraocular Movements: Extraocular movements intact. Conjunctiva/sclera: Conjunctivae normal.   Cardiovascular:      Rate and Rhythm: Normal rate and regular rhythm. Heart sounds: Normal heart sounds. No murmur heard. Pulmonary:      Effort: Pulmonary effort is normal. No respiratory distress. Breath sounds: Normal breath sounds.    Musculoskeletal:      Comments: Slight, non pitting ankle edema bilat   Skin:     General: Skin is warm and dry. Capillary Refill: Capillary refill takes less than 2 seconds. Neurological:      Mental Status: She is alert. Psychiatric:         Mood and Affect: Mood normal.                  An electronic signature was used to authenticate this note.     --CARLITO Galindo - CNP

## 2022-07-13 ENCOUNTER — TELEPHONE (OUTPATIENT)
Dept: FAMILY MEDICINE CLINIC | Age: 87
End: 2022-07-13

## 2022-07-13 NOTE — TELEPHONE ENCOUNTER
Spoke with Maninder Oakes nurse from [facility] and was wanting a verbal order to recert pt for home health for med reminder. Gave verbal ok. Chani verbalized understanding.

## 2022-07-15 ENCOUNTER — OFFICE VISIT (OUTPATIENT)
Dept: FAMILY MEDICINE CLINIC | Age: 87
End: 2022-07-15

## 2022-07-15 ENCOUNTER — TELEPHONE (OUTPATIENT)
Dept: FAMILY MEDICINE CLINIC | Age: 87
End: 2022-07-15
Payer: MEDICARE

## 2022-07-15 VITALS
OXYGEN SATURATION: 98 % | WEIGHT: 136.8 LBS | HEART RATE: 83 BPM | SYSTOLIC BLOOD PRESSURE: 100 MMHG | RESPIRATION RATE: 14 BRPM | DIASTOLIC BLOOD PRESSURE: 60 MMHG | BODY MASS INDEX: 26.72 KG/M2 | TEMPERATURE: 97.4 F

## 2022-07-15 DIAGNOSIS — Z87.440 HISTORY OF UTI: ICD-10-CM

## 2022-07-15 DIAGNOSIS — R30.0 DYSURIA: Primary | ICD-10-CM

## 2022-07-15 DIAGNOSIS — Z00.00 MEDICARE ANNUAL WELLNESS VISIT, SUBSEQUENT: Primary | ICD-10-CM

## 2022-07-15 DIAGNOSIS — N30.00 ACUTE CYSTITIS WITHOUT HEMATURIA: ICD-10-CM

## 2022-07-15 DIAGNOSIS — R30.0 DYSURIA: ICD-10-CM

## 2022-07-15 DIAGNOSIS — Z87.440 HISTORY OF UTI: Primary | ICD-10-CM

## 2022-07-15 PROBLEM — Z13.31 POSITIVE DEPRESSION SCREENING: Status: RESOLVED | Noted: 2021-07-08 | Resolved: 2022-07-15

## 2022-07-15 LAB
BILIRUBIN, POC: NEGATIVE
BLOOD URINE, POC: ABNORMAL
CLARITY, POC: ABNORMAL
COLOR, POC: ABNORMAL
GLUCOSE URINE, POC: NEGATIVE
KETONES, POC: NEGATIVE
LEUKOCYTE EST, POC: ABNORMAL
NITRITE, POC: POSITIVE
PH, POC: 5.5
PROTEIN, POC: NEGATIVE
SPECIFIC GRAVITY, POC: 1.02
UROBILINOGEN, POC: ABNORMAL

## 2022-07-15 PROCEDURE — G0439 PPPS, SUBSEQ VISIT: HCPCS | Performed by: PHYSICIAN ASSISTANT

## 2022-07-15 PROCEDURE — 81002 URINALYSIS NONAUTO W/O SCOPE: CPT | Performed by: PHYSICIAN ASSISTANT

## 2022-07-15 PROCEDURE — 1123F ACP DISCUSS/DSCN MKR DOCD: CPT | Performed by: PHYSICIAN ASSISTANT

## 2022-07-15 RX ORDER — NITROFURANTOIN 25; 75 MG/1; MG/1
100 CAPSULE ORAL 2 TIMES DAILY
Qty: 14 CAPSULE | Refills: 0 | Status: SHIPPED | OUTPATIENT
Start: 2022-07-15 | End: 2022-07-22

## 2022-07-15 RX ORDER — ZOSTER VACCINE RECOMBINANT, ADJUVANTED 50 MCG/0.5
0.5 KIT INTRAMUSCULAR SEE ADMIN INSTRUCTIONS
Qty: 0.5 ML | Refills: 0 | Status: SHIPPED | OUTPATIENT
Start: 2022-07-15 | End: 2023-01-11

## 2022-07-15 RX ORDER — BENZONATATE 100 MG/1
100 CAPSULE ORAL 3 TIMES DAILY PRN
Qty: 30 CAPSULE | Refills: 0 | Status: SHIPPED | OUTPATIENT
Start: 2022-07-15 | End: 2022-07-22

## 2022-07-15 ASSESSMENT — PATIENT HEALTH QUESTIONNAIRE - PHQ9
1. LITTLE INTEREST OR PLEASURE IN DOING THINGS: 0
SUM OF ALL RESPONSES TO PHQ QUESTIONS 1-9: 0
2. FEELING DOWN, DEPRESSED OR HOPELESS: 0
SUM OF ALL RESPONSES TO PHQ9 QUESTIONS 1 & 2: 0
SUM OF ALL RESPONSES TO PHQ QUESTIONS 1-9: 0

## 2022-07-15 ASSESSMENT — LIFESTYLE VARIABLES: HOW OFTEN DO YOU HAVE A DRINK CONTAINING ALCOHOL: NEVER

## 2022-07-15 NOTE — ASSESSMENT & PLAN NOTE
The sheath was exchanged in the right femoral vein.  UA and cx have been obtained, ATB sent in pending results

## 2022-07-15 NOTE — ASSESSMENT & PLAN NOTE
orders for Tdap/shingles printed, discussed covid booster, pt declines today but will consider getting at next OV in a few months.

## 2022-07-15 NOTE — PROGRESS NOTES
Medicare Annual Wellness Visit    Pema Hurst is here for Medicare AWV (Patient is here for medicare wellness visit )    Assessment & Plan   Medicare annual wellness visit, subsequent    Recommendations for Preventive Services Due: see orders and patient instructions/AVS.  Recommended screening schedule for the next 5-10 years is provided to the patient in written form: see Patient Instructions/AVS.     Return in 4 months (on 11/15/2022) for Medicare Annual Wellness Visit in 1 year. Subjective   The following acute and/or chronic problems were also addressed today:  UTI-family brought sample in today, UA appears positive. ATB sent. Pt denies sxs, aide states pt was slightly more confused at home which is typical for past infections . Cough-mild, intermittent. No sob/edema    Patient's complete Health Risk Assessment and screening values have been reviewed and are found in Flowsheets. The following problems were reviewed today and where indicated follow up appointments were made and/or referrals ordered.     Positive Risk Factor Screenings with Interventions:                Safety:  Do you have working smoke detectors?: (!) No  Do you have any tripping hazards - loose or unsecured carpets or rugs?: No  Do you have any tripping hazards - clutter in doorways, halls, or stairs?: No  Do you have either shower bars, grab bars, non-slip mats or non-slip surfaces in your shower or bathtub?: Yes (all)  Do all of your stairways have a railing or banister?: Not Applicable  Do you always fasten your seatbelt when you are in a car?: Yes  Safety Interventions:  Home safety tips provided    ADLs:  In the past 7 days, did you need help from others to perform any of the following everyday activities: Eating, dressing, grooming, bathing, toileting, or walking/balance?: (!) Yes (bathing balance)  Select all that apply: (!) Grooming, Walking/Balance  In the past 7 days, did you need help from others to take care of any of the following: Laundry, housekeeping, banking/finances, shopping, telephone use, food preparation, transportation, or taking medications?: (!) Yes  Select all that apply: (!) Banking/Finances, Food Preparation, Transportation, Taking Medications, Shopping, Laundry, Housekeeping  ADL Interventions:  Pt has help in home with family /aide services          Objective   Vitals:    07/15/22 1551   BP: 100/60   Site: Left Upper Arm   Position: Sitting   Cuff Size: Large Adult   Pulse: 83   Resp: 14   Temp: 97.4 °F (36.3 °C)   TempSrc: Temporal   SpO2: 98%   Weight: 136 lb 12.8 oz (62.1 kg)      Body mass index is 26.72 kg/m². General Appearance: alert and oriented to person, place and time, well-developed and well-nourished, in no acute distress  Pulmonary/Chest: clear to auscultation bilaterally- no wheezes, rales or rhonchi, normal air movement, no respiratory distress  Cardiovascular: normal rate, normal S1 and S2, no gallops, and intact distal pulses  Abdomen: soft, non-tender, non-distended, normal bowel sounds, no masses or organomegaly       Allergies   Allergen Reactions    Codeine Other (See Comments)     Gets \"jittery feeling\"     Prior to Visit Medications    Medication Sig Taking? Authorizing Provider   Tetanus-Diphth-Acell Pertussis (BOOSTRIX) 5-2.5-18.5 LF-MCG/0.5 injection Inject 0.5 mLs into the muscle once for 1 dose Yes Aundra Ser, PA-C   zoster recombinant adjuvanted vaccine [facility]) 50 MCG/0.5ML SUSR injection Inject 0.5 mLs into the muscle See Admin Instructions 1 dose now and repeat in 2-6 months Yes Aundra Ser, PA-C   benzonatate (TESSALON) 100 MG capsule Take 1 capsule by mouth 3 times daily as needed for Cough Yes Aundra Ser, PA-C   spironolactone (ALDACTONE) 25 MG tablet Take 0.5 tablets by mouth daily If needed for increased swelling in feet/ankles Yes CARLITO Crowder CNP   levothyroxine (SYNTHROID) 100 MCG tablet TAKE 1 TABLET BY MOUTH IN THE MORNING ON AN EMPTY STOMACH.  Yes Balta Rice PA-C   meclizine (ANTIVERT) 25 MG tablet Take 1 tablet by mouth 3 times daily as needed for Dizziness Yes CARLITO Roberto CNP   potassium chloride (KLOR-CON M) 20 MEQ extended release tablet TAKE 1 TABLET BY MOUTH DAILY Yes Balta Rice PA-C   omeprazole (PRILOSEC) 20 MG delayed release capsule Take 1 capsule by mouth Daily Yes Balta Rice PA-C   furosemide (LASIX) 40 MG tablet Take 1 tablet by mouth daily Yes Balta Rice PA-C   docusate sodium (DOK) 100 MG capsule TAKE ONE CAPSULE TWO TIMES A DAY AS NEEDED FOR CONSTIPATION Yes Balta Rice PA-C   famotidine (PEPCID) 40 MG tablet Take 1 tablet by mouth nightly Yes Balta Rice PA-C   montelukast (SINGULAIR) 10 MG tablet TAKE 1 TABLET BY MOUTH NIGHTLY Yes Blata Rice PA-C   traZODone (DESYREL) 50 MG tablet TAKE 1 TABLET BY MOUTH NIGHTLY Yes Balta Rice PA-C   UNABLE TO FIND One oral thermometer Yes Balta Rice PA-C   UNABLE TO FIND Pulse ox Yes Balta Rice PA-C   UNABLE TO FIND Automatic blood pressure machine Yes Balta Rice PA-C   allopurinol (ZYLOPRIM) 100 MG tablet TAKE 1 TABLET BY MOUTH DAILY Yes Balta Rice PA-C   UNABLE TO FIND Left chair Yes Balta Rice PA-C   UNABLE TO FIND rollator walker with seat and brakes Yes Balta Rice PA-C   olopatadine (PATADAY) 0.2 % SOLN ophthalmic solution  Yes Historical Provider, MD   UNABLE TO FIND Provide one electronic blood pressure cuff for home monitoring Yes Juan J Guzman MD   Blood Glucose Monitoring Suppl ([employer] BLOOD GLUCOSE MONITOR) STEWART Use as directed for daily blood sugar testing Yes Juan J Guzman MD   blood glucose test strips [facility] BLOOD GLUCOSE TEST) strip 1 each by In Vitro route daily As needed. Yes Juan J Guzman MD   acetaminophen (TYLENOL) 650 MG CR tablet Take 1 tablet by mouth every 8 hours as needed for Pain Yes Juan J Guzman MD   Misc.  Devices (TUB TRANSFER BOARD) MISC Use as directed Yes Juan J Guzman MD   aspirin EC 81 MG EC tablet Take 81 mg by mouth daily  Yes Chauncey Garber MD   nitrofurantoin, macrocrystal-monohydrate, (MACROBID) 100 MG capsule Take 1 capsule by mouth in the morning and 1 capsule before bedtime. Do all this for 7 days.   Patient not taking: Reported on 7/15/2022  Chente Verma PA-C   venlafaxine (EFFEXOR XR) 75 MG extended release capsule Take 1 capsule by mouth daily  Chente Verma PA-C   docusate sodium (DOK) 100 MG capsule TAKE ONE CAPSULE TWO TIMES A DAY AS NEEDED FOR CONSTIPATION  Chente Verma PA-C   albuterol sulfate HFA (VENTOLIN HFA) 108 (90 Base) MCG/ACT inhaler Inhale 2 puffs into the lungs every 6 hours as needed for Wheezing  Patient not taking: Reported on 7/15/2022  Chauncey Garber MD       [facility] (Including outside providers/suppliers regularly involved in providing care):   Patient Care Team:  Chente Verma PA-C as PCP - General (Physician Assistant)  Chente Verma PA-C as PCP - [facility]     Reviewed and updated this visit:  Tobacco  Allergies  Meds  Problems  Med Hx  Surg Hx  Soc Hx  Fam Hx

## 2022-07-15 NOTE — PATIENT INSTRUCTIONS
Personalized Preventive Plan for Charisse Montgomery - 7/15/2022  Medicare offers a range of preventive health benefits. Some of the tests and screenings are paid in full while other may be subject to a deductible, co-insurance, and/or copay. Some of these benefits include a comprehensive review of your medical history including lifestyle, illnesses that may run in your family, and various assessments and screenings as appropriate. After reviewing your medical record and screening and assessments performed today your provider may have ordered immunizations, labs, imaging, and/or referrals for you. A list of these orders (if applicable) as well as your Preventive Care list are included within your After Visit Summary for your review. Other Preventive Recommendations:    A preventive eye exam performed by an eye specialist is recommended every 1-2 years to screen for glaucoma; cataracts, macular degeneration, and other eye disorders. A preventive dental visit is recommended every 6 months. Try to get at least 150 minutes of exercise per week or 10,000 steps per day on a pedometer . Order or download the FREE \"Exercise & Physical Activity: Your Everyday Guide\" from [employer]. Call 1-447.855.9386 or search [employer] online. You need 1376-9777 mg of calcium and 1425-7763 IU of vitamin D per day. It is possible to meet your calcium requirement with diet alone, but a vitamin D supplement is usually necessary to meet this goal.  When exposed to the sun, use a sunscreen that protects against both UVA and UVB radiation with an SPF of 30 or greater. Reapply every 2 to 3 hours or after sweating, drying off with a towel, or swimming. Always wear a seat belt when traveling in a car. Always wear a helmet when riding a bicycle or motorcycle.

## 2022-07-15 NOTE — TELEPHONE ENCOUNTER
Someone form home health stopped by the office and advised that pt has an appt later today with Fabian Davidson and thinks that pt has a UTI. Requesting a cup and hat. Will bring back urine sample prior to pt's appt. Urine sample received.

## 2022-07-18 LAB
ORGANISM: ABNORMAL
URINE CULTURE, ROUTINE: ABNORMAL

## 2022-07-18 NOTE — RESULT ENCOUNTER NOTE
Called and unable to [facility] phone continued to ring and then asked for a remote access code

## 2022-07-20 DIAGNOSIS — R42 VERTIGO: ICD-10-CM

## 2022-07-20 RX ORDER — MECLIZINE HYDROCHLORIDE 25 MG/1
25 TABLET ORAL 3 TIMES DAILY PRN
Qty: 90 TABLET | Refills: 0 | Status: SHIPPED | OUTPATIENT
Start: 2022-07-20 | End: 2022-10-12 | Stop reason: SDUPTHER

## 2022-07-27 ENCOUNTER — TELEPHONE (OUTPATIENT)
Dept: FAMILY MEDICINE CLINIC | Age: 87
End: 2022-07-27

## 2022-08-02 RX ORDER — NYSTATIN 100000 U/G
CREAM TOPICAL
Qty: 30 G | Refills: 5 | Status: SHIPPED | OUTPATIENT
Start: 2022-08-02

## 2022-08-02 NOTE — TELEPHONE ENCOUNTER
Called ciro friedman/home care again today-she states the rash on the buttock is slightly flaky-looks clark yeasty-they tried Desitin and that was not helping-she is wondering if the nystatin powder would help with it

## 2022-08-14 PROBLEM — Z00.00 MEDICARE ANNUAL WELLNESS VISIT, SUBSEQUENT: Status: RESOLVED | Noted: 2022-07-15 | Resolved: 2022-08-14

## 2022-08-17 DIAGNOSIS — R60.0 LEG EDEMA: ICD-10-CM

## 2022-08-18 RX ORDER — SPIRONOLACTONE 25 MG/1
12.5 TABLET ORAL DAILY
Qty: 45 TABLET | Refills: 3 | Status: SHIPPED | OUTPATIENT
Start: 2022-08-18

## 2022-09-07 ENCOUNTER — HOSPITAL ENCOUNTER (OUTPATIENT)
Age: 87
Setting detail: SPECIMEN
Discharge: HOME | End: 2022-09-07
Payer: MEDICARE

## 2022-09-07 ENCOUNTER — TELEPHONE (OUTPATIENT)
Dept: FAMILY MEDICINE CLINIC | Age: 87
End: 2022-09-07

## 2022-09-07 LAB
BACTERIA: ABNORMAL /HPF
BILIRUBIN URINE: NEGATIVE MG/DL
BLOOD, URINE: NEGATIVE
CAST TYPE: ABNORMAL /HPF
CLARITY: ABNORMAL
COLOR: YELLOW
CRYSTAL TYPE: NEGATIVE /HPF
EPITHELIAL CELLS, UA: ABNORMAL /HPF
GLUCOSE, URINE: NEGATIVE MG/DL
KETONES, URINE: NEGATIVE MG/DL
LEUKOCYTE ESTERASE, URINE: ABNORMAL
NITRITE URINE, QUANTITATIVE: POSITIVE
PH, URINE: 5.5 (ref 5–8)
PROTEIN UA: NEGATIVE MG/DL
RBC URINE: ABNORMAL /HPF (ref 0–6)
SPECIFIC GRAVITY UA: 1.01 (ref 1–1.03)
UROBILINOGEN, URINE: 0.2 MG/DL (ref 0.2–1)
WBC UA: ABNORMAL /HPF (ref 0–5)

## 2022-09-07 PROCEDURE — 87086 URINE CULTURE/COLONY COUNT: CPT

## 2022-09-07 PROCEDURE — 87077 CULTURE AEROBIC IDENTIFY: CPT

## 2022-09-07 PROCEDURE — 81001 URINALYSIS AUTO W/SCOPE: CPT

## 2022-09-07 PROCEDURE — 87186 SC STD MICRODIL/AGAR DIL: CPT

## 2022-09-08 RX ORDER — NITROFURANTOIN 25; 75 MG/1; MG/1
100 CAPSULE ORAL 2 TIMES DAILY
Qty: 14 CAPSULE | Refills: 0 | Status: SHIPPED | OUTPATIENT
Start: 2022-09-08 | End: 2022-09-12 | Stop reason: ALTCHOICE

## 2022-09-09 LAB
CULTURE: ABNORMAL
Lab: ABNORMAL
SPECIMEN: ABNORMAL

## 2022-09-12 RX ORDER — SULFAMETHOXAZOLE AND TRIMETHOPRIM 800; 160 MG/1; MG/1
1 TABLET ORAL 2 TIMES DAILY
Qty: 14 TABLET | Refills: 0 | Status: SHIPPED | OUTPATIENT
Start: 2022-09-12 | End: 2022-09-19

## 2022-10-12 DIAGNOSIS — R42 VERTIGO: ICD-10-CM

## 2022-10-12 RX ORDER — MECLIZINE HYDROCHLORIDE 25 MG/1
25 TABLET ORAL 3 TIMES DAILY PRN
Qty: 90 TABLET | Refills: 0 | Status: SHIPPED | OUTPATIENT
Start: 2022-10-12

## 2022-10-19 DIAGNOSIS — E79.0 HYPERURICEMIA: ICD-10-CM

## 2022-10-19 DIAGNOSIS — Z87.39 HISTORY OF GOUT: ICD-10-CM

## 2022-10-19 RX ORDER — ALLOPURINOL 100 MG/1
100 TABLET ORAL DAILY
Qty: 90 TABLET | Refills: 3 | Status: SHIPPED | OUTPATIENT
Start: 2022-10-19

## 2022-11-01 DIAGNOSIS — R14.0 ABDOMINAL DISTENSION: ICD-10-CM

## 2022-11-01 RX ORDER — FAMOTIDINE 40 MG/1
40 TABLET, FILM COATED ORAL NIGHTLY
Qty: 90 TABLET | Refills: 3 | Status: SHIPPED | OUTPATIENT
Start: 2022-11-01

## 2022-11-09 DIAGNOSIS — E03.9 ACQUIRED HYPOTHYROIDISM: Chronic | ICD-10-CM

## 2022-11-09 RX ORDER — LEVOTHYROXINE SODIUM 0.1 MG/1
TABLET ORAL
Qty: 90 TABLET | Refills: 2 | Status: SHIPPED | OUTPATIENT
Start: 2022-11-09

## 2022-11-11 ENCOUNTER — TELEPHONE (OUTPATIENT)
Dept: FAMILY MEDICINE CLINIC | Age: 87
End: 2022-11-11

## 2022-11-11 DIAGNOSIS — R30.0 DYSURIA: Primary | ICD-10-CM

## 2022-11-11 LAB
BILIRUBIN, POC: ABNORMAL
BLOOD URINE, POC: NEGATIVE
CLARITY, POC: ABNORMAL
COLOR, POC: YELLOW
GLUCOSE URINE, POC: NEGATIVE
KETONES, POC: NEGATIVE
LEUKOCYTE EST, POC: NEGATIVE
NITRITE, POC: ABNORMAL
PH, POC: 5.5
PROTEIN, POC: NEGATIVE
SPECIFIC GRAVITY, POC: 1.02
UROBILINOGEN, POC: ABNORMAL

## 2022-11-11 PROCEDURE — 81002 URINALYSIS NONAUTO W/O SCOPE: CPT | Performed by: PHYSICIAN ASSISTANT

## 2022-11-11 RX ORDER — NITROFURANTOIN 25; 75 MG/1; MG/1
100 CAPSULE ORAL 2 TIMES DAILY
Qty: 14 CAPSULE | Refills: 0 | Status: SHIPPED | OUTPATIENT
Start: 2022-11-11 | End: 2022-11-18

## 2022-11-11 NOTE — TELEPHONE ENCOUNTER
Patient having UTI symptoms and brought in urine specimen. POCT urine completed and was positive for nitrates. Order to send urine for culture. Urine obtained for culture.

## 2022-11-14 LAB
ORGANISM: ABNORMAL
ORGANISM: ABNORMAL
URINE CULTURE, ROUTINE: ABNORMAL
URINE CULTURE, ROUTINE: ABNORMAL

## 2022-11-15 ENCOUNTER — OFFICE VISIT (OUTPATIENT)
Dept: FAMILY MEDICINE CLINIC | Age: 87
End: 2022-11-15
Payer: MEDICARE

## 2022-11-15 VITALS
RESPIRATION RATE: 18 BRPM | TEMPERATURE: 97.9 F | SYSTOLIC BLOOD PRESSURE: 118 MMHG | BODY MASS INDEX: 27.58 KG/M2 | WEIGHT: 141.2 LBS | HEART RATE: 82 BPM | OXYGEN SATURATION: 99 % | DIASTOLIC BLOOD PRESSURE: 62 MMHG

## 2022-11-15 DIAGNOSIS — E11.9 TYPE 2 DIABETES MELLITUS WITHOUT COMPLICATION, WITHOUT LONG-TERM CURRENT USE OF INSULIN (HCC): Primary | ICD-10-CM

## 2022-11-15 DIAGNOSIS — Z87.440 HISTORY OF RECURRENT UTIS: ICD-10-CM

## 2022-11-15 DIAGNOSIS — N18.31 STAGE 3A CHRONIC KIDNEY DISEASE (HCC): ICD-10-CM

## 2022-11-15 DIAGNOSIS — E03.9 ACQUIRED HYPOTHYROIDISM: Chronic | ICD-10-CM

## 2022-11-15 DIAGNOSIS — R19.5 DARK STOOLS: ICD-10-CM

## 2022-11-15 LAB — HBA1C MFR BLD: 5.5 %

## 2022-11-15 PROCEDURE — G8484 FLU IMMUNIZE NO ADMIN: HCPCS | Performed by: PHYSICIAN ASSISTANT

## 2022-11-15 PROCEDURE — 1090F PRES/ABSN URINE INCON ASSESS: CPT | Performed by: PHYSICIAN ASSISTANT

## 2022-11-15 PROCEDURE — 1123F ACP DISCUSS/DSCN MKR DOCD: CPT | Performed by: PHYSICIAN ASSISTANT

## 2022-11-15 PROCEDURE — G8417 CALC BMI ABV UP PARAM F/U: HCPCS | Performed by: PHYSICIAN ASSISTANT

## 2022-11-15 PROCEDURE — 3044F HG A1C LEVEL LT 7.0%: CPT | Performed by: PHYSICIAN ASSISTANT

## 2022-11-15 PROCEDURE — 99214 OFFICE O/P EST MOD 30 MIN: CPT | Performed by: PHYSICIAN ASSISTANT

## 2022-11-15 PROCEDURE — G8427 DOCREV CUR MEDS BY ELIG CLIN: HCPCS | Performed by: PHYSICIAN ASSISTANT

## 2022-11-15 PROCEDURE — 83036 HEMOGLOBIN GLYCOSYLATED A1C: CPT | Performed by: PHYSICIAN ASSISTANT

## 2022-11-15 PROCEDURE — 1036F TOBACCO NON-USER: CPT | Performed by: PHYSICIAN ASSISTANT

## 2022-11-15 ASSESSMENT — PATIENT HEALTH QUESTIONNAIRE - PHQ9
SUM OF ALL RESPONSES TO PHQ QUESTIONS 1-9: 0
1. LITTLE INTEREST OR PLEASURE IN DOING THINGS: 0
2. FEELING DOWN, DEPRESSED OR HOPELESS: 0
SUM OF ALL RESPONSES TO PHQ9 QUESTIONS 1 & 2: 0
SUM OF ALL RESPONSES TO PHQ QUESTIONS 1-9: 0

## 2022-11-15 NOTE — PROGRESS NOTES
Bonnie Min  8/3/1929  80 y.o.  female    SUBJECTIVE:    Chief Complaint   Patient presents with    Follow-up     4 month f/u- No questions or concerns    Diabetes       HPI  Pt here today for routine f/u. Home health aid is with her and daughter, Cody Agosto, arrived several minutes later. DM-A1C 5.5 today. She is not currently on any medication and doing well     Recurrent UTIs-since December, 2021 she has had approx 6 UTIs (with positive cultures). Pt freq feels she does not completely empty her bladder. Daughter would like referral to urology for evaluation and pt agreeable. Hypothyroidism-No symptoms of hypo or hyperthyroidism: no decreased or increased weight, no feeling cold/chilly or excessively warm, no diarrhea or constipation, no undue sweatiness, anxiety or palpitations. CKD-not currently following with nephrology, ismael 12/21 0.8 with GFR > 60    PHQ Scores 11/15/2022 7/15/2022 6/1/2022 3/22/2022 10/22/2021 10/7/2021 8/27/2021   PHQ2 Score 0 0 0 0 0 1 0   PHQ9 Score 0 0 3 0 0 1 0     Interpretation of Total Score Depression Severity: 1-4 = Minimal depression, 5-9 = Mild depression, 10-14 = Moderate depression, 15-19 = Moderately severe depression, 20-27 = Severe depression     Current Outpatient Medications on File Prior to Visit   Medication Sig Dispense Refill    nitrofurantoin, macrocrystal-monohydrate, (MACROBID) 100 MG capsule Take 1 capsule by mouth 2 times daily for 7 days 14 capsule 0    levothyroxine (SYNTHROID) 100 MCG tablet TAKE 1 TABLET BY MOUTH IN THE MORNING ON AN EMPTY STOMACH.  90 tablet 2    famotidine (PEPCID) 40 MG tablet TAKE 1 TABLET BY MOUTH NIGHTLY 90 tablet 3    allopurinol (ZYLOPRIM) 100 MG tablet TAKE 1 TABLET BY MOUTH DAILY 90 tablet 3    meclizine (ANTIVERT) 25 MG tablet Take 1 tablet by mouth 3 times daily as needed for Dizziness 90 tablet 0    spironolactone (ALDACTONE) 25 MG tablet TAKE 0.5 TABLETS BY MOUTH DAILY IF NEEDED FOR INCREASED SWELLING IN FEET/ANKLES 45 tablet 3    nystatin (MYCOSTATIN) 434506 UNIT/GM cream Apply topically 2 times daily. 30 g 5    zoster recombinant adjuvanted vaccine (SHINGRIX) 50 MCG/0.5ML SUSR injection Inject 0.5 mLs into the muscle See Admin Instructions 1 dose now and repeat in 2-6 months 0.5 mL 0    potassium chloride (KLOR-CON M) 20 MEQ extended release tablet TAKE 1 TABLET BY MOUTH DAILY 30 tablet 5    omeprazole (PRILOSEC) 20 MG delayed release capsule Take 1 capsule by mouth Daily 90 capsule 3    venlafaxine (EFFEXOR XR) 75 MG extended release capsule Take 1 capsule by mouth daily 90 capsule 3    furosemide (LASIX) 40 MG tablet Take 1 tablet by mouth daily 90 tablet 3    docusate sodium (DOK) 100 MG capsule TAKE ONE CAPSULE TWO TIMES A DAY AS NEEDED FOR CONSTIPATION 180 capsule 3    montelukast (SINGULAIR) 10 MG tablet TAKE 1 TABLET BY MOUTH NIGHTLY 90 tablet 3    traZODone (DESYREL) 50 MG tablet TAKE 1 TABLET BY MOUTH NIGHTLY 90 tablet 3    UNABLE TO FIND One oral thermometer 1 Units 0    UNABLE TO FIND Pulse ox 1 Units 0    UNABLE TO FIND Automatic blood pressure machine 1 Units 0    UNABLE TO FIND Left chair 1 Units 0    UNABLE TO FIND rollator walker with seat and brakes 1 Units 0    olopatadine (PATADAY) 0.2 % SOLN ophthalmic solution   5    UNABLE TO FIND Provide one electronic blood pressure cuff for home monitoring 1 each 0    Blood Glucose Monitoring Suppl ([employer] BLOOD GLUCOSE MONITOR) STEWART Use as directed for daily blood sugar testing 2 Device 3    blood glucose test strips ([employer] BLOOD GLUCOSE TEST) strip 1 each by In Vitro route daily As needed. 100 each 3    acetaminophen (TYLENOL) 650 MG CR tablet Take 1 tablet by mouth every 8 hours as needed for Pain 90 tablet 3    Misc.  Devices (TUB TRANSFER BOARD) MISC Use as directed 1 each 0    aspirin EC 81 MG EC tablet Take 81 mg by mouth daily  30 tablet 5    [DISCONTINUED] docusate sodium (DOK) 100 MG capsule TAKE ONE CAPSULE TWO TIMES A DAY AS NEEDED FOR CONSTIPATION 60 capsule 5    albuterol sulfate HFA (VENTOLIN HFA) 108 (90 Base) MCG/ACT inhaler Inhale 2 puffs into the lungs every 6 hours as needed for Wheezing (Patient not taking: No sig reported) 3 Inhaler 3     No current facility-administered medications on file prior to visit. Allergies   Allergen Reactions    Codeine Other (See Comments)     Gets \"jittery feeling\"       Past Medical History:   Diagnosis Date    ACP (advance care planning) 8/10/2020    Acute cystitis without hematuria 7/8/2021    Allergic rhinitis     Anxiety     Arthritis     Bradycardia     Cellulitis, leg 1/8/2012    Dementia ([facility] [address]     Depression     Diabetes mellitus ([facility] [address]     sugars controlled off meds    Diabetic eye exam ([facility] [address] 1/28/16    no retinopathy    Dysuria 9/28/2020    Flu vaccine need 9/28/2020    Gout     Hyperlipidemia     Hypertension     Hypothyroidism     Positive depression screening 7/8/2021       Past Surgical History:   Procedure Laterality Date    APPENDECTOMY  13 yrs ago    CARPAL TUNNEL RELEASE      bilateral    COLONOSCOPY      ENDOSCOPY, COLON, DIAGNOSTIC  7/8/13    gastritis, hiatal hernia    EYE SURGERY      both eyes    HERNIA REPAIR      umbilical    HIP SURGERY Left 08/22/2016    ORIF    HYSTERECTOMY (CERVIX STATUS UNKNOWN)      complete    JOINT REPLACEMENT      left knee    THYROIDECTOMY, PARTIAL      ULNAR TUNNEL RELEASE      bilateral       Social History     Socioeconomic History    Marital status:      Spouse name: None    Number of children: None    Years of education: None    Highest education level: None   Tobacco Use    Smoking status: Never    Smokeless tobacco: Never   Vaping Use    Vaping Use: Never used   Substance and Sexual Activity    Alcohol use:  Yes     Alcohol/week: 1.0 standard drink     Types: 1 Cans of beer per week     Comment: may be less than once weekly    Drug use: No    Sexual activity: Yes     Partners: Male     Comment: states sometimes     Social Determinants of Health Comments: Pt uses wheeled walker with seat       /PLAN:    Problem List          Endocrine    Hypothyroidism (Chronic)    Relevant Medications    levothyroxine (SYNTHROID) 100 MCG tablet    Other Relevant Orders    TSH with Reflex to FT4    Type 2 diabetes mellitus without complication, without long-term current use of insulin (HCC) - Primary     A1C today 5.5, continue lifestyle modifications          Relevant Medications    Blood Glucose Monitoring Suppl ([employer] BLOOD GLUCOSE MONITOR) STEWART    blood glucose test strips ([employer] BLOOD GLUCOSE TEST) strip    Other Relevant Orders    POCT glycosylated hemoglobin (Hb A1C) (Completed)    CBC with Auto Differential       Genitourinary    CKD (chronic kidney disease), stage III ([facility] [address]     Recheck labs today          Relevant Orders    Comprehensive Metabolic Panel    CBC with Auto Differential       Other    History of recurrent UTIs    Relevant Orders    External Referral To Urology    Dark stools     Hemoccult negative today, will continue to monitor and let PCP know if acute changes/not resolving in next few days                  Return in about 4 months (around 3/15/2023).

## 2022-11-16 ENCOUNTER — TELEPHONE (OUTPATIENT)
Dept: FAMILY MEDICINE CLINIC | Age: 87
End: 2022-11-16

## 2022-11-16 PROBLEM — Z87.440 HISTORY OF RECURRENT UTIS: Status: ACTIVE | Noted: 2022-11-16

## 2022-11-16 PROBLEM — R19.5 DARK STOOLS: Status: ACTIVE | Noted: 2022-11-16

## 2022-11-16 ASSESSMENT — ENCOUNTER SYMPTOMS
ABDOMINAL PAIN: 0
NAUSEA: 0
SHORTNESS OF BREATH: 0
VOMITING: 0
BACK PAIN: 1
DIARRHEA: 0
COUGH: 0

## 2022-11-16 NOTE — TELEPHONE ENCOUNTER
Passport manager for client called to request a script for a kabootie cushion. Corey Gorman nurse called to ask of PP was able to supply dur to client having a stage two pressure ulcer to her buttock. Corey Gorman nurse feels this would assist in alleviating pressure to area. Fax number for script to be faxed if approved by PCP is [phone] or [phone].

## 2022-11-16 NOTE — ASSESSMENT & PLAN NOTE
Hemoccult negative today, will continue to monitor and let PCP know if acute changes/not resolving in next few days

## 2022-11-23 ENCOUNTER — HOSPITAL ENCOUNTER (OUTPATIENT)
Age: 87
Setting detail: SPECIMEN
Discharge: HOME | End: 2022-11-23
Payer: MEDICARE

## 2022-11-23 LAB
ALBUMIN SERPL-MCNC: 4.3 GM/DL (ref 3.4–5)
ALP BLD-CCNC: 85 IU/L (ref 40–129)
ALT SERPL-CCNC: 17 U/L (ref 10–40)
ANION GAP SERPL CALCULATED.3IONS-SCNC: 15 MMOL/L (ref 4–16)
AST SERPL-CCNC: 24 IU/L (ref 15–37)
BASOPHILS ABSOLUTE: 0.1 K/CU MM
BASOPHILS RELATIVE PERCENT: 1.1 % (ref 0–1)
BILIRUB SERPL-MCNC: 0.4 MG/DL (ref 0–1)
BUN BLDV-MCNC: 21 MG/DL (ref 6–23)
CALCIUM SERPL-MCNC: 9.1 MG/DL (ref 8.3–10.6)
CHLORIDE BLD-SCNC: 99 MMOL/L (ref 99–110)
CO2: 24 MMOL/L (ref 21–32)
CREAT SERPL-MCNC: 0.8 MG/DL (ref 0.6–1.1)
DIFFERENTIAL TYPE: ABNORMAL
EOSINOPHILS ABSOLUTE: 0.4 K/CU MM
EOSINOPHILS RELATIVE PERCENT: 6.8 % (ref 0–3)
GFR SERPL CREATININE-BSD FRML MDRD: >60 ML/MIN/1.73M2
GLUCOSE FASTING: 161 MG/DL (ref 70–99)
HCT VFR BLD CALC: 37.6 % (ref 37–47)
HEMOGLOBIN: 12.5 GM/DL (ref 12.5–16)
IMMATURE NEUTROPHIL %: 0.4 % (ref 0–0.43)
LYMPHOCYTES ABSOLUTE: 1.4 K/CU MM
LYMPHOCYTES RELATIVE PERCENT: 25.7 % (ref 24–44)
MCH RBC QN AUTO: 30.6 PG (ref 27–31)
MCHC RBC AUTO-ENTMCNC: 33.2 % (ref 32–36)
MCV RBC AUTO: 92.2 FL (ref 78–100)
MONOCYTES ABSOLUTE: 0.3 K/CU MM
MONOCYTES RELATIVE PERCENT: 5.3 % (ref 0–4)
PDW BLD-RTO: 13.2 % (ref 11.7–14.9)
PLATELET # BLD: 141 K/CU MM (ref 140–440)
PMV BLD AUTO: 10.9 FL (ref 7.5–11.1)
POTASSIUM SERPL-SCNC: 4 MMOL/L (ref 3.5–5.1)
RBC # BLD: 4.08 M/CU MM (ref 4.2–5.4)
SEGMENTED NEUTROPHILS ABSOLUTE COUNT: 3.3 K/CU MM
SEGMENTED NEUTROPHILS RELATIVE PERCENT: 60.7 % (ref 36–66)
SODIUM BLD-SCNC: 138 MMOL/L (ref 135–145)
T4 FREE: 1.22 NG/DL (ref 0.9–1.8)
TOTAL IMMATURE NEUTOROPHIL: 0.02 K/CU MM
TOTAL PROTEIN: 6.3 GM/DL (ref 6.4–8.2)
TSH HIGH SENSITIVITY: 3.06 UIU/ML (ref 0.27–4.2)
WBC # BLD: 5.5 K/CU MM (ref 4–10.5)

## 2022-11-23 PROCEDURE — 84443 ASSAY THYROID STIM HORMONE: CPT

## 2022-11-23 PROCEDURE — 80053 COMPREHEN METABOLIC PANEL: CPT

## 2022-11-23 PROCEDURE — 84439 ASSAY OF FREE THYROXINE: CPT

## 2022-11-23 PROCEDURE — 85025 COMPLETE CBC W/AUTO DIFF WBC: CPT

## 2022-12-13 ENCOUNTER — TELEPHONE (OUTPATIENT)
Dept: FAMILY MEDICINE CLINIC | Age: 87
End: 2022-12-13
Payer: MEDICARE

## 2022-12-13 DIAGNOSIS — R30.0 DYSURIA: Primary | ICD-10-CM

## 2022-12-13 DIAGNOSIS — R41.0 CONFUSION: ICD-10-CM

## 2022-12-13 DIAGNOSIS — R82.90 ABNORMAL URINALYSIS: ICD-10-CM

## 2022-12-13 LAB
BILIRUBIN, POC: NEGATIVE
BLOOD URINE, POC: ABNORMAL
CLARITY, POC: ABNORMAL
COLOR, POC: YELLOW
GLUCOSE URINE, POC: NEGATIVE
KETONES, POC: NEGATIVE
LEUKOCYTE EST, POC: ABNORMAL
NITRITE, POC: POSITIVE
PH, POC: 6
PROTEIN, POC: NEGATIVE
SPECIFIC GRAVITY, POC: >=1.03
UROBILINOGEN, POC: ABNORMAL

## 2022-12-13 PROCEDURE — 81002 URINALYSIS NONAUTO W/O SCOPE: CPT | Performed by: PHYSICIAN ASSISTANT

## 2022-12-13 RX ORDER — CIPROFLOXACIN 250 MG/1
250 TABLET, FILM COATED ORAL 2 TIMES DAILY
Qty: 6 TABLET | Refills: 0 | Status: SHIPPED | OUTPATIENT
Start: 2022-12-13 | End: 2022-12-16

## 2022-12-13 NOTE — TELEPHONE ENCOUNTER
Patient was having increased confusion and wondering if patient was starting with UTI. Urine was brought in and completed a POC urinalysis . Results positive for trace of blood, positive for nitrates and positive for leukocytes.    Please advise

## 2022-12-14 DIAGNOSIS — K21.9 GASTROESOPHAGEAL REFLUX DISEASE WITHOUT ESOPHAGITIS: ICD-10-CM

## 2022-12-14 DIAGNOSIS — F33.41 MAJOR DEPRESSIVE DISORDER, RECURRENT EPISODE, IN PARTIAL REMISSION (HCC): Chronic | ICD-10-CM

## 2022-12-14 RX ORDER — VENLAFAXINE HYDROCHLORIDE 75 MG/1
75 CAPSULE, EXTENDED RELEASE ORAL DAILY
Qty: 90 CAPSULE | Refills: 3 | Status: SHIPPED | OUTPATIENT
Start: 2022-12-14

## 2022-12-14 RX ORDER — OMEPRAZOLE 20 MG/1
20 CAPSULE, DELAYED RELEASE ORAL DAILY
Qty: 90 CAPSULE | Refills: 3 | Status: SHIPPED | OUTPATIENT
Start: 2022-12-14

## 2022-12-16 RX ORDER — CEFUROXIME AXETIL 250 MG/1
250 TABLET ORAL 2 TIMES DAILY
Qty: 20 TABLET | Refills: 0 | Status: SHIPPED | OUTPATIENT
Start: 2022-12-16 | End: 2022-12-26

## 2022-12-28 DIAGNOSIS — F51.01 PRIMARY INSOMNIA: ICD-10-CM

## 2022-12-28 RX ORDER — TRAZODONE HYDROCHLORIDE 50 MG/1
50 TABLET ORAL NIGHTLY
Qty: 90 TABLET | Refills: 3 | Status: SHIPPED | OUTPATIENT
Start: 2022-12-28

## 2023-01-16 ENCOUNTER — TELEPHONE (OUTPATIENT)
Dept: FAMILY MEDICINE CLINIC | Age: 88
End: 2023-01-16

## 2023-01-16 NOTE — TELEPHONE ENCOUNTER
Received call from patient Chayainocente . Patient daughter wanting to know if patient urine can be tested. Patient has had increase in resisting medication and uncooperative.    Please advise

## 2023-01-17 ENCOUNTER — HOSPITAL ENCOUNTER (OUTPATIENT)
Age: 88
Setting detail: SPECIMEN
Discharge: HOME | End: 2023-01-17
Payer: MEDICARE

## 2023-01-17 DIAGNOSIS — R42 VERTIGO: ICD-10-CM

## 2023-01-17 LAB
BACTERIA: ABNORMAL /HPF
BILIRUBIN URINE: NEGATIVE MG/DL
BLOOD, URINE: NEGATIVE
CAST TYPE: ABNORMAL /HPF
CLARITY: CLEAR
COLOR: YELLOW
CRYSTAL TYPE: NEGATIVE /HPF
EPITHELIAL CELLS, UA: 5 /HPF
GLUCOSE, URINE: NEGATIVE MG/DL
KETONES, URINE: NEGATIVE MG/DL
LEUKOCYTE ESTERASE, URINE: ABNORMAL
NITRITE URINE, QUANTITATIVE: NEGATIVE
PH, URINE: 5.5 (ref 5–8)
PROTEIN UA: NEGATIVE MG/DL
RBC URINE: NEGATIVE /HPF (ref 0–6)
SPECIFIC GRAVITY UA: 1.02 (ref 1–1.03)
UROBILINOGEN, URINE: 0.2 MG/DL (ref 0.2–1)
WBC UA: 10 /HPF (ref 0–5)

## 2023-01-17 PROCEDURE — 87088 URINE BACTERIA CULTURE: CPT

## 2023-01-17 PROCEDURE — 87186 SC STD MICRODIL/AGAR DIL: CPT

## 2023-01-17 PROCEDURE — 81001 URINALYSIS AUTO W/SCOPE: CPT

## 2023-01-17 PROCEDURE — 87086 URINE CULTURE/COLONY COUNT: CPT

## 2023-01-18 RX ORDER — MECLIZINE HYDROCHLORIDE 25 MG/1
25 TABLET ORAL 3 TIMES DAILY PRN
Qty: 90 TABLET | Refills: 0 | Status: SHIPPED | OUTPATIENT
Start: 2023-01-18

## 2023-01-18 RX ORDER — MONTELUKAST SODIUM 10 MG/1
10 TABLET ORAL NIGHTLY
Qty: 90 TABLET | Refills: 3 | Status: SHIPPED | OUTPATIENT
Start: 2023-01-18

## 2023-01-19 LAB
CULTURE: ABNORMAL
CULTURE: ABNORMAL
Lab: ABNORMAL
SPECIMEN: ABNORMAL

## 2023-01-19 RX ORDER — CEPHALEXIN 500 MG/1
500 CAPSULE ORAL 3 TIMES DAILY
Qty: 21 CAPSULE | Refills: 0 | Status: SHIPPED | OUTPATIENT
Start: 2023-01-19 | End: 2023-01-26

## 2023-01-19 RX ORDER — NITROFURANTOIN 25; 75 MG/1; MG/1
100 CAPSULE ORAL 2 TIMES DAILY
Qty: 14 CAPSULE | Refills: 0 | Status: SHIPPED | OUTPATIENT
Start: 2023-01-19 | End: 2023-01-19 | Stop reason: ALTCHOICE

## 2023-02-13 ENCOUNTER — TELEPHONE (OUTPATIENT)
Dept: FAMILY MEDICINE CLINIC | Age: 88
End: 2023-02-13

## 2023-02-13 DIAGNOSIS — S69.91XA HAND INJURY, RIGHT, INITIAL ENCOUNTER: Primary | ICD-10-CM

## 2023-02-13 NOTE — TELEPHONE ENCOUNTER
I can put order in for xray, I would recommend ice/elevate for a few days and if not improving then she can do the xray. If very painful now, then go ahead and complete xray.

## 2023-02-13 NOTE — TELEPHONE ENCOUNTER
Ana friedman/ces[facility] called-patient had a fall over the weekend-they did not take her to the ER-she was not injured-this AM she was there checking on patient and the patient's right thumb is very swollen and bruised to the thumb joint-she wasn't sure if you wanted to order a xray-please haritha

## 2023-03-12 ENCOUNTER — HOSPITAL ENCOUNTER (EMERGENCY)
Age: 88
Discharge: ANOTHER HOSPITAL | End: 2023-03-12
Attending: EMERGENCY MEDICINE
Payer: MEDICARE

## 2023-03-12 ENCOUNTER — APPOINTMENT (OUTPATIENT)
Dept: CT IMAGING | Age: 88
End: 2023-03-12
Payer: MEDICARE

## 2023-03-12 ENCOUNTER — APPOINTMENT (OUTPATIENT)
Dept: GENERAL RADIOLOGY | Age: 88
End: 2023-03-12
Payer: MEDICARE

## 2023-03-12 VITALS
BODY MASS INDEX: 27.68 KG/M2 | SYSTOLIC BLOOD PRESSURE: 161 MMHG | HEIGHT: 60 IN | WEIGHT: 141 LBS | OXYGEN SATURATION: 100 % | HEART RATE: 97 BPM | DIASTOLIC BLOOD PRESSURE: 83 MMHG | TEMPERATURE: 97.4 F | RESPIRATION RATE: 23 BRPM

## 2023-03-12 DIAGNOSIS — R29.898 LEFT LEG WEAKNESS: ICD-10-CM

## 2023-03-12 DIAGNOSIS — R29.90 STROKE-LIKE SYMPTOMS: ICD-10-CM

## 2023-03-12 DIAGNOSIS — R47.01 APHASIA: ICD-10-CM

## 2023-03-12 DIAGNOSIS — R41.0 DISORIENTATION: Primary | ICD-10-CM

## 2023-03-12 LAB
ALBUMIN SERPL-MCNC: 4.5 GM/DL (ref 3.4–5)
ALP BLD-CCNC: 127 IU/L (ref 40–129)
ALT SERPL-CCNC: 22 U/L (ref 10–40)
AMMONIA: 14 UMOL/L (ref 11–51)
ANION GAP SERPL CALCULATED.3IONS-SCNC: 13 MMOL/L (ref 4–16)
AST SERPL-CCNC: 30 IU/L (ref 15–37)
BACTERIA: ABNORMAL /HPF
BASOPHILS ABSOLUTE: 0.1 K/CU MM
BASOPHILS RELATIVE PERCENT: 0.8 % (ref 0–1)
BILIRUB SERPL-MCNC: 0.6 MG/DL (ref 0–1)
BILIRUBIN URINE: NEGATIVE MG/DL
BLOOD, URINE: NEGATIVE
BUN SERPL-MCNC: 26 MG/DL (ref 6–23)
CALCIUM SERPL-MCNC: 10.4 MG/DL (ref 8.3–10.6)
CAST TYPE: ABNORMAL /HPF
CHLORIDE BLD-SCNC: 101 MMOL/L (ref 99–110)
CLARITY: ABNORMAL
CO2: 29 MMOL/L (ref 21–32)
COLOR: YELLOW
CREAT SERPL-MCNC: 0.9 MG/DL (ref 0.6–1.1)
CRYSTAL TYPE: NEGATIVE /HPF
DIFFERENTIAL TYPE: ABNORMAL
EOSINOPHILS ABSOLUTE: 0.1 K/CU MM
EOSINOPHILS RELATIVE PERCENT: 2.2 % (ref 0–3)
EPITHELIAL CELLS, UA: 4 /HPF
GFR SERPL CREATININE-BSD FRML MDRD: 60 ML/MIN/1.73M2
GLUCOSE SERPL-MCNC: 147 MG/DL (ref 70–99)
GLUCOSE, URINE: NEGATIVE MG/DL
HCT VFR BLD CALC: 43.3 % (ref 37–47)
HEMOGLOBIN: 14.3 GM/DL (ref 12.5–16)
IMMATURE NEUTROPHIL %: 0.2 % (ref 0–0.43)
KETONES, URINE: ABNORMAL MG/DL
LACTIC ACID, SEPSIS: 2 MMOL/L (ref 0.5–1.9)
LACTIC ACID, SEPSIS: 2.5 MMOL/L (ref 0.5–1.9)
LEUKOCYTE ESTERASE, URINE: ABNORMAL
LYMPHOCYTES ABSOLUTE: 1.5 K/CU MM
LYMPHOCYTES RELATIVE PERCENT: 24.1 % (ref 24–44)
MCH RBC QN AUTO: 29.8 PG (ref 27–31)
MCHC RBC AUTO-ENTMCNC: 33 % (ref 32–36)
MCV RBC AUTO: 90.2 FL (ref 78–100)
MONOCYTES ABSOLUTE: 0.4 K/CU MM
MONOCYTES RELATIVE PERCENT: 7.1 % (ref 0–4)
NITRITE URINE, QUANTITATIVE: NEGATIVE
PDW BLD-RTO: 13.3 % (ref 11.7–14.9)
PH, URINE: 8.5 (ref 5–8)
PLATELET # BLD: 181 K/CU MM (ref 140–440)
PMV BLD AUTO: 11 FL (ref 7.5–11.1)
POTASSIUM SERPL-SCNC: 4 MMOL/L (ref 3.5–5.1)
PRO-BNP: 474 PG/ML
PROTEIN UA: ABNORMAL MG/DL
RBC # BLD: 4.8 M/CU MM (ref 4.2–5.4)
RBC URINE: 1 /HPF (ref 0–6)
SEGMENTED NEUTROPHILS ABSOLUTE COUNT: 4 K/CU MM
SEGMENTED NEUTROPHILS RELATIVE PERCENT: 65.6 % (ref 36–66)
SODIUM BLD-SCNC: 143 MMOL/L (ref 135–145)
SPECIFIC GRAVITY UA: 1.01 (ref 1–1.03)
TOTAL IMMATURE NEUTOROPHIL: 0.01 K/CU MM
TOTAL PROTEIN: 7.7 GM/DL (ref 6.4–8.2)
TROPONIN T: <0.01 NG/ML
UROBILINOGEN, URINE: 0.2 MG/DL (ref 0.2–1)
WBC # BLD: 6 K/CU MM (ref 4–10.5)
WBC UA: 35 /HPF (ref 0–5)

## 2023-03-12 PROCEDURE — 96365 THER/PROPH/DIAG IV INF INIT: CPT

## 2023-03-12 PROCEDURE — 70450 CT HEAD/BRAIN W/O DYE: CPT

## 2023-03-12 PROCEDURE — 6360000004 HC RX CONTRAST MEDICATION: Performed by: EMERGENCY MEDICINE

## 2023-03-12 PROCEDURE — 71045 X-RAY EXAM CHEST 1 VIEW: CPT

## 2023-03-12 PROCEDURE — 84484 ASSAY OF TROPONIN QUANT: CPT

## 2023-03-12 PROCEDURE — 87077 CULTURE AEROBIC IDENTIFY: CPT

## 2023-03-12 PROCEDURE — 70498 CT ANGIOGRAPHY NECK: CPT

## 2023-03-12 PROCEDURE — 87040 BLOOD CULTURE FOR BACTERIA: CPT

## 2023-03-12 PROCEDURE — 99285 EMERGENCY DEPT VISIT HI MDM: CPT

## 2023-03-12 PROCEDURE — 83880 ASSAY OF NATRIURETIC PEPTIDE: CPT

## 2023-03-12 PROCEDURE — 6370000000 HC RX 637 (ALT 250 FOR IP): Performed by: EMERGENCY MEDICINE

## 2023-03-12 PROCEDURE — 82140 ASSAY OF AMMONIA: CPT

## 2023-03-12 PROCEDURE — 87086 URINE CULTURE/COLONY COUNT: CPT

## 2023-03-12 PROCEDURE — 85025 COMPLETE CBC W/AUTO DIFF WBC: CPT

## 2023-03-12 PROCEDURE — 83605 ASSAY OF LACTIC ACID: CPT

## 2023-03-12 PROCEDURE — 6360000002 HC RX W HCPCS: Performed by: EMERGENCY MEDICINE

## 2023-03-12 PROCEDURE — 80053 COMPREHEN METABOLIC PANEL: CPT

## 2023-03-12 PROCEDURE — 87186 SC STD MICRODIL/AGAR DIL: CPT

## 2023-03-12 PROCEDURE — 2580000003 HC RX 258: Performed by: EMERGENCY MEDICINE

## 2023-03-12 PROCEDURE — 87150 DNA/RNA AMPLIFIED PROBE: CPT

## 2023-03-12 PROCEDURE — 93005 ELECTROCARDIOGRAM TRACING: CPT | Performed by: EMERGENCY MEDICINE

## 2023-03-12 PROCEDURE — 81001 URINALYSIS AUTO W/SCOPE: CPT

## 2023-03-12 RX ORDER — OXYBUTYNIN CHLORIDE 10 MG/1
TABLET, EXTENDED RELEASE ORAL DAILY
Status: ON HOLD | COMMUNITY
Start: 2023-02-01

## 2023-03-12 RX ORDER — NITROFURANTOIN 25; 75 MG/1; MG/1
CAPSULE ORAL
Status: ON HOLD | COMMUNITY
Start: 2023-01-31

## 2023-03-12 RX ORDER — MEMANTINE HYDROCHLORIDE 5 MG/1
TABLET ORAL
Status: ON HOLD | COMMUNITY
Start: 2022-12-28

## 2023-03-12 RX ORDER — ACETAMINOPHEN 325 MG/1
650 TABLET ORAL ONCE
Status: COMPLETED | OUTPATIENT
Start: 2023-03-12 | End: 2023-03-12

## 2023-03-12 RX ORDER — 0.9 % SODIUM CHLORIDE 0.9 %
1000 INTRAVENOUS SOLUTION INTRAVENOUS ONCE
Status: COMPLETED | OUTPATIENT
Start: 2023-03-12 | End: 2023-03-12

## 2023-03-12 RX ORDER — ASPIRIN 81 MG/1
324 TABLET, CHEWABLE ORAL ONCE
Status: COMPLETED | OUTPATIENT
Start: 2023-03-12 | End: 2023-03-12

## 2023-03-12 RX ORDER — SODIUM CHLORIDE 9 MG/ML
INJECTION, SOLUTION INTRAVENOUS CONTINUOUS
Status: DISCONTINUED | OUTPATIENT
Start: 2023-03-12 | End: 2023-03-13 | Stop reason: HOSPADM

## 2023-03-12 RX ADMIN — ASPIRIN 81 MG 324 MG: 81 TABLET ORAL at 19:56

## 2023-03-12 RX ADMIN — SODIUM CHLORIDE: 9 INJECTION, SOLUTION INTRAVENOUS at 18:48

## 2023-03-12 RX ADMIN — ACETAMINOPHEN 650 MG: 325 TABLET ORAL at 19:56

## 2023-03-12 RX ADMIN — CEFTRIAXONE SODIUM 1000 MG: 1 INJECTION, POWDER, FOR SOLUTION INTRAMUSCULAR; INTRAVENOUS at 21:52

## 2023-03-12 RX ADMIN — SODIUM CHLORIDE 1000 ML: 9 INJECTION, SOLUTION INTRAVENOUS at 19:14

## 2023-03-12 RX ADMIN — IOPAMIDOL 75 ML: 755 INJECTION, SOLUTION INTRAVENOUS at 20:34

## 2023-03-12 NOTE — ED PROVIDER NOTES
Alie Eldridge was checked out to me by Dr. Peterson Meyers. Please see his/her initial documentation for details of the patient's ED presentation, physical exam and completed studies. In brief, Alie Eldridge is a 80 y.o. female that presents with LLE weakness and aphasia with last known normal last night around 2030 when she went to bed.     Labs  Results for orders placed or performed during the hospital encounter of 03/12/23   Ammonia   Result Value Ref Range    Ammonia 14 11 - 51 UMOL/L   CBC with Auto Differential   Result Value Ref Range    WBC 6.0 4.0 - 10.5 K/CU MM    RBC 4.80 4.2 - 5.4 M/CU MM    Hemoglobin 14.3 12.5 - 16.0 GM/DL    Hematocrit 43.3 37 - 47 %    MCV 90.2 78 - 100 FL    MCH 29.8 27 - 31 PG    MCHC 33.0 32.0 - 36.0 %    RDW 13.3 11.7 - 14.9 %    Platelets 488 507 - 052 K/CU MM    MPV 11.0 7.5 - 11.1 FL    Differential Type AUTOMATED DIFFERENTIAL     Segs Relative 65.6 36 - 66 %    Lymphocytes % 24.1 24 - 44 %    Monocytes % 7.1 (H) 0 - 4 %    Eosinophils % 2.2 0 - 3 %    Basophils % 0.8 0 - 1 %    Segs Absolute 4.0 K/CU MM    Lymphocytes Absolute 1.5 K/CU MM    Monocytes Absolute 0.4 K/CU MM    Eosinophils Absolute 0.1 K/CU MM    Basophils Absolute 0.1 K/CU MM    Immature Neutrophil % 0.2 0 - 0.43 %    Total Immature Neutrophil 0.01 K/CU MM   Comprehensive Metabolic Panel   Result Value Ref Range    Sodium 143 135 - 145 MMOL/L    Potassium 4.0 3.5 - 5.1 MMOL/L    Chloride 101 99 - 110 mMol/L    CO2 29 21 - 32 MMOL/L    BUN 26 (H) 6 - 23 MG/DL    Creatinine 0.9 0.6 - 1.1 MG/DL    Est, Glom Filt Rate 60 (L) >60 mL/min/1.73m2    Glucose 147 (H) 70 - 99 MG/DL    Calcium 10.4 8.3 - 10.6 MG/DL    Albumin 4.5 3.4 - 5.0 GM/DL    Total Protein 7.7 6.4 - 8.2 GM/DL    Total Bilirubin 0.6 0.0 - 1.0 MG/DL    ALT 22 10 - 40 U/L    AST 30 15 - 37 IU/L    Alkaline Phosphatase 127 40 - 129 IU/L    Anion Gap 13 4 - 16   Troponin   Result Value Ref Range    Troponin T <0.010 <0.01 NG/ML   Lactate, Sepsis Result Value Ref Range    Lactic Acid, Sepsis 2.0 (HH) 0.5 - 1.9 mMOL/L   Brain Natriuretic Peptide   Result Value Ref Range    Pro-.0 (H) <300 PG/ML   EKG 12 Lead   Result Value Ref Range    Ventricular Rate 75 BPM    Atrial Rate 75 BPM    P-R Interval 162 ms    QRS Duration 96 ms    Q-T Interval 408 ms    QTc Calculation (Bazett) 455 ms    P Axis 83 degrees    R Axis -53 degrees    T Axis 38 degrees    Diagnosis       Normal sinus rhythm  Left axis deviation  Abnormal ECG  When compared with ECG of 17-DEC-2021 18:31,  No significant change was found         MDM:  Patient endorsed to me pending urinalysis results and plan for transfer to [facility] for further evaluation of likely CVA. I did call report to Dr. Wilfred Liao at [facility] and she is accepting of patient. Transportation to be arranged when bed is available. She does recommend CT imaging of head and neck which is ordered. Straight cath is also ordered for urine as patient has been incontinent here and can not inform us of her needs to urinate. Straight cath is finally obtained and is consistent with possible urinary tract infection. Urine is sent for culture. I did place patient on Rocephin for coverage. CTA imaging does result and is negative for acute process. Transportation is arranged once bed is available and patient is transferred. Daughter updated regarding all of the above. Final Impression:  1. Disorientation    2. Aphasia    3. Left leg weakness          Please note that portions of this note may have been complete with a voice recognition program.  Efforts were made to edit the dictations, but occasional words are mis-transcribed.           Noble Garcia MD  03/13/23 1288

## 2023-03-12 NOTE — ED PROVIDER NOTES
Emergency Department Encounter  Location: [facility] [address] [place] [address]    Patient: Elham Douglass  MRN: [id number]  : 8/3/1929  Date of evaluation: 3/12/2023  ED Provider: Ramon Sorto DO, FACEP    Chief Complaint:    Altered Mental Status (Pt arrives via ems from home with c/o ams pt has hx of dementia and is currently being tx for uti according to ems. BS 99)    [place]:  Elham Douglass is a 80 y.o. female that presents to the emergency department by squad with complaints of decreased mental status that started sometime today. Her daughter lives down the hooks from her and has cameras in her apartment. She states that her mother went to bed last night around 830 then got up today around 3:00. She states that her mother is having difficulty speaking today and does not recognize her. The patient does have a history of dementia but this is a big change for her. Normally she is able to converse but today she is unable to to speak. She is following commands but normally she is a little better than what we are seeing at this time. The patient has recently seen a neurologist to be evaluated for dementia. She is denying shortness of breath or chest pain. She is incontinent of urine and has recently been started on Macrobid for preventative treatment of UTIs. She is denying pain at this time but is unable to speak, she is shaking her head no and nodding her head yes to questions. Her daughter states normally she is able to talk. She is able to move all of her extremities. She is having some difficulty lifting her left leg off the bed but has no difficulty lifting her right and left arms off the bed. She has no difficulty lifting her right leg off the bed.       ROS - see HPI, below listed is current ROS at time of my eval:  Patient is largely unable to tell us if she is having any pain or problems at this time    Past Medical History:   Diagnosis Date    ACP (advance care planning) 8/10/2020    Acute cystitis without hematuria 7/8/2021    Allergic rhinitis     Anxiety     Arthritis     Bradycardia     Cellulitis, leg 1/8/2012    Dementia ([facility]     Depression     Diabetes mellitus ([facility]     sugars controlled off meds    Diabetic eye exam ([facility] 1/28/16    no retinopathy    Dysuria 9/28/2020    Flu vaccine need 9/28/2020    Gout     Hyperlipidemia     Hypertension     Hypothyroidism     Positive depression screening 7/8/2021     Past Surgical History:   Procedure Laterality Date    APPENDECTOMY  13 yrs ago    CARPAL TUNNEL RELEASE      bilateral    COLONOSCOPY      ENDOSCOPY, COLON, DIAGNOSTIC  7/8/13    gastritis, hiatal hernia    EYE SURGERY      both eyes    HERNIA REPAIR      umbilical    HIP SURGERY Left 08/22/2016    ORIF    HYSTERECTOMY (CERVIX STATUS UNKNOWN)      complete    JOINT REPLACEMENT      left knee    THYROIDECTOMY, PARTIAL      ULNAR TUNNEL RELEASE      bilateral     Family History   Problem Relation Age of Onset    Heart Disease Father     Cancer Brother     Heart Attack Brother      Social History     Socioeconomic History    Marital status:      Spouse name: Not on file    Number of children: Not on file    Years of education: Not on file    Highest education level: Not on file   Occupational History    Not on file   Tobacco Use    Smoking status: Never    Smokeless tobacco: Never   Vaping Use    Vaping Use: Never used   Substance and Sexual Activity    Alcohol use:  Yes     Alcohol/week: 1.0 standard drink     Types: 1 Cans of beer per week     Comment: may be less than once weekly    Drug use: No    Sexual activity: Yes     Partners: Male     Comment: states sometimes   Other Topics Concern    Not on file   Social History Narrative    Not on file     Social Determinants of Health     Financial Resource Strain: Not on file   Food Insecurity: Not on file   Transportation Needs: Not on file   Physical Activity: Insufficiently Active    Days of Exercise per Week: 3 days Minutes of Exercise per Session: 10 min   Stress: Not on file   Social Connections: Not on file   Intimate Partner Violence: Not on file   Housing Stability: Not on file     Current Facility-Administered Medications   Medication Dose Route Frequency Provider Last Rate Last Admin    0.9 % sodium chloride infusion   IntraVENous Continuous Dayana Rey DO         Current Outpatient Medications   Medication Sig Dispense Refill    nitrofurantoin, macrocrystal-monohydrate, (MACROBID) 100 MG capsule 1 capsule DAILY (route: oral)      memantine (NAMENDA) 5 MG tablet       oxybutynin (DITROPAN-XL) 10 MG extended release tablet Take by mouth daily      traZODone (DESYREL) 50 MG tablet TAKE 1 TABLET BY MOUTH NIGHTLY 90 tablet 3    meclizine (ANTIVERT) 25 MG tablet TAKE 1 TABLET BY MOUTH 3 TIMES DAILY AS NEEDED FOR DIZZINESS 90 tablet 0    montelukast (SINGULAIR) 10 MG tablet TAKE 1 TABLET BY MOUTH NIGHTLY 90 tablet 3    venlafaxine (EFFEXOR XR) 75 MG extended release capsule TAKE 1 CAPSULE BY MOUTH DAILY 90 capsule 3    omeprazole (PRILOSEC) 20 MG delayed release capsule TAKE 1 CAPSULE BY MOUTH DAILY 90 capsule 3    UNABLE TO FIND Kaboodie cushion 1 Units 0    levothyroxine (SYNTHROID) 100 MCG tablet TAKE 1 TABLET BY MOUTH IN THE MORNING ON AN EMPTY STOMACH. 90 tablet 2    famotidine (PEPCID) 40 MG tablet TAKE 1 TABLET BY MOUTH NIGHTLY 90 tablet 3    allopurinol (ZYLOPRIM) 100 MG tablet TAKE 1 TABLET BY MOUTH DAILY 90 tablet 3    spironolactone (ALDACTONE) 25 MG tablet TAKE 0.5 TABLETS BY MOUTH DAILY IF NEEDED FOR INCREASED SWELLING IN FEET/ANKLES 45 tablet 3    nystatin (MYCOSTATIN) 854755 UNIT/GM cream Apply topically 2 times daily.  30 g 5    potassium chloride (KLOR-CON M) 20 MEQ extended release tablet TAKE 1 TABLET BY MOUTH DAILY 30 tablet 5    furosemide (LASIX) 40 MG tablet Take 1 tablet by mouth daily 90 tablet 3    docusate sodium (DOK) 100 MG capsule TAKE ONE CAPSULE TWO TIMES A DAY AS NEEDED FOR CONSTIPATION 180 capsule 3    UNABLE TO FIND One oral thermometer 1 Units 0    UNABLE TO FIND Pulse ox 1 Units 0    UNABLE TO FIND Automatic blood pressure machine 1 Units 0    UNABLE TO FIND Left chair 1 Units 0    UNABLE TO FIND rollator walker with seat and brakes 1 Units 0    albuterol sulfate HFA (VENTOLIN HFA) 108 (90 Base) MCG/ACT inhaler Inhale 2 puffs into the lungs every 6 hours as needed for Wheezing (Patient not taking: No sig reported) 3 Inhaler 3    olopatadine (PATADAY) 0.2 % SOLN ophthalmic solution   5    UNABLE TO FIND Provide one electronic blood pressure cuff for home monitoring 1 each 0    Blood Glucose Monitoring Suppl (Preggers BLOOD GLUCOSE MONITOR) STEWART Use as directed for daily blood sugar testing 2 Device 3    blood glucose test strips (EMBRACE BLOOD GLUCOSE TEST) strip 1 each by In Vitro route daily As needed. 100 each 3    acetaminophen (TYLENOL) 650 MG CR tablet Take 1 tablet by mouth every 8 hours as needed for Pain 90 tablet 3    Misc. Devices (TUB TRANSFER BOARD) MISC Use as directed 1 each 0    aspirin EC 81 MG EC tablet Take 81 mg by mouth daily  30 tablet 5     Allergies   Allergen Reactions    Codeine Other (See Comments)     Gets \"jittery feeling\"       Nursing Notes Reviewed    Physical Exam:  ED Triage Vitals [03/12/23 1727]   Enc Vitals Group      BP (!) 155/105      Heart Rate 82      Resp 15      Temp 97.4 °F (36.3 °C)      Temp Source Temporal      SpO2       Weight 141 lb (64 kg)      Height 5' (1.524 m)      Head Circumference       Peak Flow       Pain Score       Pain Loc       Pain Edu? Excl. in [address]? GENERAL APPEARANCE: Awake and alert. Cooperative. No acute distress. Nontoxic in appearance. She is following commands and will shake her head yes and no  HEAD: Normocephalic. Atraumatic. EYES: EOM's grossly intact. Sclera anicteric. Pupils equally reactive to light  ENT: Tolerates saliva. No trismus. NECK: Supple. Trachea midline.   Neck has full range of motion and is nontender to palpation  CARDIO: RRR. Radial pulse 2+. No murmur or ectopy. LUNGS: Respirations unlabored. CTAB no wheezes rales or rhonchi  ABDOMEN: Soft. Non-distended. Non-tender. Slight tenderness to palpation without guarding or rebound  EXTREMITIES: No acute deformities. Mild edema in lower extremities  SKIN: Warm and dry. NEUROLOGICAL: No gross facial drooping. Moves all 4 extremities. She has difficulty lifting her left leg off the bed and holding it up. Amararosalia Antunezon NIH Stroke Scale at 6:47 PM is:  Level of Consciousness:  1 - not alert but arousable by minor stimulation to obey, answer or respond    LOC Questions:  0 - answers both questions correctly    LOC Commands:  0 - performs both tasks correctly    Best Gaze:  0 - normal    Visual Fields:  0 - no visual loss    Facial Palsy:  0 - normal symmetric movement    Motor-Arm-Left:  0 - no drift, limb holds 90 (or 45) degrees for full 10 seconds    Motor-Leg-Left:  1 - drift; leg falls by the end of the 5 second period but does not hit bed    Motor-Arm-Right:  0 - no drift, limb holds 90 (or 45) degrees for full 10 seconds    Motor-Leg-Right:  0 - no drift; leg holds 30 degree position for full 5 seconds    Limb Ataxia:  0 - absent    Sensory:  0 - normal; no sensory loss    Best Language:  1 - mild to moderate aphasia; some obvious loss of fluency or facility of comprehension without significant limitation on ideas expressed or form of expression. Reduction of speech and/or comprehension, however, makes conversation about provided materials difficult or impossible. For example, in conversation about provided materials, examiner can identify picture or naming card content from patient's response. Dysarthria:  1 - mild to moderate, patient slurs at least some words and at worst, can be understood with some difficulty    Extinction and Inattention:  0 - no abnormality    PSYCHIATRIC: Normal mood.      Labs:  Results for orders placed or performed during the hospital encounter of 03/12/23   CBC with Auto Differential   Result Value Ref Range    WBC 6.0 4.0 - 10.5 K/CU MM    RBC 4.80 4.2 - 5.4 M/CU MM    Hemoglobin 14.3 12.5 - 16.0 GM/DL    Hematocrit 43.3 37 - 47 %    MCV 90.2 78 - 100 FL    MCH 29.8 27 - 31 PG    MCHC 33.0 32.0 - 36.0 %    RDW 13.3 11.7 - 14.9 %    Platelets 631 999 - 314 K/CU MM    MPV 11.0 7.5 - 11.1 FL    Differential Type AUTOMATED DIFFERENTIAL     Segs Relative 65.6 36 - 66 %    Lymphocytes % 24.1 24 - 44 %    Monocytes % 7.1 (H) 0 - 4 %    Eosinophils % 2.2 0 - 3 %    Basophils % 0.8 0 - 1 %    Segs Absolute 4.0 K/CU MM    Lymphocytes Absolute 1.5 K/CU MM    Monocytes Absolute 0.4 K/CU MM    Eosinophils Absolute 0.1 K/CU MM    Basophils Absolute 0.1 K/CU MM    Immature Neutrophil % 0.2 0 - 0.43 %    Total Immature Neutrophil 0.01 K/CU MM   Comprehensive Metabolic Panel   Result Value Ref Range    Sodium 143 135 - 145 MMOL/L    Potassium 4.0 3.5 - 5.1 MMOL/L    Chloride 101 99 - 110 mMol/L    CO2 29 21 - 32 MMOL/L    BUN 26 (H) 6 - 23 MG/DL    Creatinine 0.9 0.6 - 1.1 MG/DL    Est, Glom Filt Rate 60 (L) >60 mL/min/1.73m2    Glucose 147 (H) 70 - 99 MG/DL    Calcium 10.4 8.3 - 10.6 MG/DL    Albumin 4.5 3.4 - 5.0 GM/DL    Total Protein 7.7 6.4 - 8.2 GM/DL    Total Bilirubin 0.6 0.0 - 1.0 MG/DL    ALT 22 10 - 40 U/L    AST 30 15 - 37 IU/L    Alkaline Phosphatase 127 40 - 129 IU/L    Anion Gap 13 4 - 16   Troponin   Result Value Ref Range    Troponin T <0.010 <0.01 NG/ML   Lactate, Sepsis   Result Value Ref Range    Lactic Acid, Sepsis 2.0 (HH) 0.5 - 1.9 mMOL/L   EKG 12 Lead   Result Value Ref Range    Ventricular Rate 75 BPM    Atrial Rate 75 BPM    P-R Interval 162 ms    QRS Duration 96 ms    Q-T Interval 408 ms    QTc Calculation (Bazett) 455 ms    P Axis 83 degrees    R Axis -53 degrees    T Axis 38 degrees    Diagnosis       Normal sinus rhythm  Left axis deviation  Abnormal ECG  When compared with ECG of 17-DEC-2021 18:31,  No significant change was found         EKG (if obtained): (All EKG's are interpreted by myself in the absence of a cardiologist)  The Ekg interpreted by me in the absence of a cardiologist shows. normal sinus rhythm with a rate of 75  Axis is   Left axis deviation  QTc is   455  Intervals and Durations are unremarkable. No specific ST-T wave changes appreciated. No evidence of acute ischemia. No significant change from prior EKG dated 17 December 2021    Radiographs (if obtained):  [] The following radiograph was interpreted by myself in the absence of a radiologist:  [x] Radiologist's Report reviewed at time of ED visit:  XR CHEST PORTABLE   Final Result   Stable appearance of the chest with no acute findings. CT HEAD WO CONTRAST    (Results Pending)       Exclusion criteria - the patient is NOT to be included for SEP-1 Core Measure due to:  May have criteria for sepsis, but does not meet criteria for severe sepsis or septic shock     ED Course and MDM:  This patient presents to the emergency department with complaints of decreased mental status. Its not clear when this actually started. She is having some difficulty holding up her left leg. She is aphasic whereas normally she is able to speak. The stroke scale is 4 by my initial calculation. Work-up here in the ER is pending and CT scan is pending. A stroke alert was not called because we do not know what time this patient's symptoms started. Some of her labs are back including a serum lactate of 2.0. She does not have a fever and her white count is normal.  I have low clinical suspicion that this patient is an acute septic event. At this time the work-up is pending and care will be transferred to the oncoming [facility] doctor.   Please see his note regarding final disposition of this patient based on the remaining labs and results of the CT  CC/HPI Summary, DDx, ED Course, and Reassessment: Patient's care was transferred to the [facility] doctor. History from : Family daughter    Limitations to history : Dysarthria    Patient was given the following medications:  Medications   0.9 % sodium chloride infusion (has no administration in time range)       Independent Imaging Interpretation by me: None    EKG (if obtained): (All EKG's are interpreted by myself in the absence of a cardiologist) interpreted as above    Chronic conditions affecting care: Dementia    Discussion with Other Profesionals : None    Social Determinants : None    Records Reviewed : None    Disposition Considerations (tests considered but not done, Shared Decision Making, Pt Expectation of Test or Tx.): Patient's care will be transferred to the [facility] doctor. Please see his note regarding final disposition of this patient based on the findings of the laboratory and imaging work-up. I am the Primary Clinician of Record. Final Impression:  1. Disorientation    2. Aphasia    3. Left leg weakness      DISPOSITION     Patient referred to: No follow-up provider specified.   Discharge medications:  New Prescriptions    No medications on file     (Please note that portions of this note may have been completed with a voice recognition program. Efforts were made to edit the dictations but occasionally words are mis-transcribed.)    Socrates Cabrera DO, [facility]  Board certified in [address] [place], [place]  03/12/23 5611

## 2023-03-13 ENCOUNTER — HOSPITAL ENCOUNTER (INPATIENT)
Age: 88
LOS: 8 days | Discharge: NURSING, REHAB OR LONG-TERM CARE | DRG: 689 | End: 2023-03-21
Attending: STUDENT IN AN ORGANIZED HEALTH CARE EDUCATION/TRAINING PROGRAM | Admitting: INTERNAL MEDICINE
Payer: MEDICARE

## 2023-03-13 ENCOUNTER — APPOINTMENT (OUTPATIENT)
Dept: MRI IMAGING | Age: 88
DRG: 689 | End: 2023-03-13
Attending: STUDENT IN AN ORGANIZED HEALTH CARE EDUCATION/TRAINING PROGRAM
Payer: MEDICARE

## 2023-03-13 ENCOUNTER — APPOINTMENT (OUTPATIENT)
Dept: GENERAL RADIOLOGY | Age: 88
DRG: 689 | End: 2023-03-13
Attending: STUDENT IN AN ORGANIZED HEALTH CARE EDUCATION/TRAINING PROGRAM
Payer: MEDICARE

## 2023-03-13 PROBLEM — R29.90 STROKE-LIKE SYMPTOMS: Status: ACTIVE | Noted: 2023-03-13

## 2023-03-13 LAB
ANION GAP SERPL CALCULATED.3IONS-SCNC: 10 MMOL/L (ref 4–16)
BUN SERPL-MCNC: 21 MG/DL (ref 6–23)
CALCIUM SERPL-MCNC: 8.5 MG/DL (ref 8.3–10.6)
CHLORIDE BLD-SCNC: 106 MMOL/L (ref 99–110)
CHOLEST SERPL-MCNC: 202 MG/DL
CO2: 27 MMOL/L (ref 21–32)
CREAT SERPL-MCNC: 0.8 MG/DL (ref 0.6–1.1)
ESTIMATED AVERAGE GLUCOSE: 128 MG/DL
GFR SERPL CREATININE-BSD FRML MDRD: >60 ML/MIN/1.73M2
GLUCOSE SERPL-MCNC: 129 MG/DL (ref 70–99)
HBA1C MFR BLD: 6.1 % (ref 4.2–6.3)
HCT VFR BLD CALC: 35.7 % (ref 37–47)
HDLC SERPL-MCNC: 45 MG/DL
HEMOGLOBIN: 11.6 GM/DL (ref 12.5–16)
LACTATE: 1.1 MMOL/L (ref 0.5–1.9)
LDLC SERPL CALC-MCNC: 134 MG/DL
LV EF: 58 %
LVEF MODALITY: NORMAL
MAGNESIUM: 1.8 MG/DL (ref 1.8–2.4)
MCH RBC QN AUTO: 29.8 PG (ref 27–31)
MCHC RBC AUTO-ENTMCNC: 32.5 % (ref 32–36)
MCV RBC AUTO: 91.8 FL (ref 78–100)
PDW BLD-RTO: 13.3 % (ref 11.7–14.9)
PLATELET # BLD: 159 K/CU MM (ref 140–440)
PMV BLD AUTO: 11.4 FL (ref 7.5–11.1)
POTASSIUM SERPL-SCNC: 3 MMOL/L (ref 3.5–5.1)
POTASSIUM SERPL-SCNC: 3.2 MMOL/L (ref 3.5–5.1)
RBC # BLD: 3.89 M/CU MM (ref 4.2–5.4)
SODIUM BLD-SCNC: 143 MMOL/L (ref 135–145)
TRIGL SERPL-MCNC: 116 MG/DL
WBC # BLD: 5.1 K/CU MM (ref 4–10.5)

## 2023-03-13 PROCEDURE — 92610 EVALUATE SWALLOWING FUNCTION: CPT | Performed by: SPEECH-LANGUAGE PATHOLOGIST

## 2023-03-13 PROCEDURE — 94761 N-INVAS EAR/PLS OXIMETRY MLT: CPT

## 2023-03-13 PROCEDURE — 6360000002 HC RX W HCPCS: Performed by: INTERNAL MEDICINE

## 2023-03-13 PROCEDURE — 2580000003 HC RX 258: Performed by: INTERNAL MEDICINE

## 2023-03-13 PROCEDURE — 73120 X-RAY EXAM OF HAND: CPT

## 2023-03-13 PROCEDURE — 99211 OFF/OP EST MAY X REQ PHY/QHP: CPT

## 2023-03-13 PROCEDURE — 83605 ASSAY OF LACTIC ACID: CPT

## 2023-03-13 PROCEDURE — 70551 MRI BRAIN STEM W/O DYE: CPT

## 2023-03-13 PROCEDURE — 80048 BASIC METABOLIC PNL TOTAL CA: CPT

## 2023-03-13 PROCEDURE — 80061 LIPID PANEL: CPT

## 2023-03-13 PROCEDURE — 99223 1ST HOSP IP/OBS HIGH 75: CPT | Performed by: NURSE PRACTITIONER

## 2023-03-13 PROCEDURE — 1200000000 HC SEMI PRIVATE

## 2023-03-13 PROCEDURE — 6370000000 HC RX 637 (ALT 250 FOR IP): Performed by: INTERNAL MEDICINE

## 2023-03-13 PROCEDURE — 36415 COLL VENOUS BLD VENIPUNCTURE: CPT

## 2023-03-13 PROCEDURE — 83735 ASSAY OF MAGNESIUM: CPT

## 2023-03-13 PROCEDURE — 6370000000 HC RX 637 (ALT 250 FOR IP): Performed by: NURSE PRACTITIONER

## 2023-03-13 PROCEDURE — 6360000002 HC RX W HCPCS: Performed by: NURSE PRACTITIONER

## 2023-03-13 PROCEDURE — 85027 COMPLETE CBC AUTOMATED: CPT

## 2023-03-13 PROCEDURE — 83036 HEMOGLOBIN GLYCOSYLATED A1C: CPT

## 2023-03-13 PROCEDURE — 93306 TTE W/DOPPLER COMPLETE: CPT

## 2023-03-13 PROCEDURE — 84132 ASSAY OF SERUM POTASSIUM: CPT

## 2023-03-13 RX ORDER — DOCUSATE SODIUM 100 MG/1
100 CAPSULE, LIQUID FILLED ORAL 2 TIMES DAILY PRN
Status: DISCONTINUED | OUTPATIENT
Start: 2023-03-13 | End: 2023-03-21 | Stop reason: HOSPADM

## 2023-03-13 RX ORDER — MONTELUKAST SODIUM 10 MG/1
10 TABLET ORAL EVERY EVENING
Status: DISCONTINUED | OUTPATIENT
Start: 2023-03-13 | End: 2023-03-21 | Stop reason: HOSPADM

## 2023-03-13 RX ORDER — LEVOTHYROXINE SODIUM 0.1 MG/1
100 TABLET ORAL DAILY
Status: DISCONTINUED | OUTPATIENT
Start: 2023-03-13 | End: 2023-03-21 | Stop reason: HOSPADM

## 2023-03-13 RX ORDER — ENOXAPARIN SODIUM 100 MG/ML
40 INJECTION SUBCUTANEOUS DAILY
Status: DISCONTINUED | OUTPATIENT
Start: 2023-03-13 | End: 2023-03-21 | Stop reason: HOSPADM

## 2023-03-13 RX ORDER — ONDANSETRON 2 MG/ML
4 INJECTION INTRAMUSCULAR; INTRAVENOUS EVERY 6 HOURS PRN
Status: DISCONTINUED | OUTPATIENT
Start: 2023-03-13 | End: 2023-03-21 | Stop reason: HOSPADM

## 2023-03-13 RX ORDER — ATORVASTATIN CALCIUM 40 MG/1
40 TABLET, FILM COATED ORAL EVERY EVENING
Status: DISCONTINUED | OUTPATIENT
Start: 2023-03-13 | End: 2023-03-21 | Stop reason: HOSPADM

## 2023-03-13 RX ORDER — SODIUM CHLORIDE, SODIUM LACTATE, POTASSIUM CHLORIDE, CALCIUM CHLORIDE 600; 310; 30; 20 MG/100ML; MG/100ML; MG/100ML; MG/100ML
INJECTION, SOLUTION INTRAVENOUS CONTINUOUS
Status: DISCONTINUED | OUTPATIENT
Start: 2023-03-13 | End: 2023-03-21 | Stop reason: HOSPADM

## 2023-03-13 RX ORDER — MV-MIN/FA/VIT K/LUTEIN/ZEAXANT 200MCG-5MG
CAPSULE ORAL 2 TIMES DAILY
COMMUNITY

## 2023-03-13 RX ORDER — ALLOPURINOL 100 MG/1
100 TABLET ORAL DAILY
Status: DISCONTINUED | OUTPATIENT
Start: 2023-03-13 | End: 2023-03-21 | Stop reason: HOSPADM

## 2023-03-13 RX ORDER — POTASSIUM CHLORIDE 20 MEQ/1
20 TABLET, EXTENDED RELEASE ORAL DAILY
Status: DISCONTINUED | OUTPATIENT
Start: 2023-03-13 | End: 2023-03-21 | Stop reason: HOSPADM

## 2023-03-13 RX ORDER — LORAZEPAM 2 MG/ML
0.5 INJECTION INTRAMUSCULAR ONCE
Status: COMPLETED | OUTPATIENT
Start: 2023-03-13 | End: 2023-03-13

## 2023-03-13 RX ORDER — MULTIVIT-MIN/IRON/FOLIC ACID/K 18-600-40
CAPSULE ORAL
COMMUNITY

## 2023-03-13 RX ORDER — ASPIRIN 81 MG/1
81 TABLET ORAL DAILY
Status: DISCONTINUED | OUTPATIENT
Start: 2023-03-13 | End: 2023-03-21 | Stop reason: HOSPADM

## 2023-03-13 RX ORDER — POTASSIUM CHLORIDE 20 MEQ/1
40 TABLET, EXTENDED RELEASE ORAL ONCE
Status: COMPLETED | OUTPATIENT
Start: 2023-03-13 | End: 2023-03-13

## 2023-03-13 RX ORDER — FUROSEMIDE 40 MG/1
40 TABLET ORAL DAILY
Status: DISCONTINUED | OUTPATIENT
Start: 2023-03-13 | End: 2023-03-21 | Stop reason: HOSPADM

## 2023-03-13 RX ORDER — MECLIZINE HYDROCHLORIDE 25 MG/1
25 TABLET ORAL 3 TIMES DAILY PRN
Status: DISCONTINUED | OUTPATIENT
Start: 2023-03-13 | End: 2023-03-21 | Stop reason: HOSPADM

## 2023-03-13 RX ORDER — PANTOPRAZOLE SODIUM 20 MG/1
20 TABLET, DELAYED RELEASE ORAL
Status: DISCONTINUED | OUTPATIENT
Start: 2023-03-13 | End: 2023-03-21 | Stop reason: HOSPADM

## 2023-03-13 RX ORDER — ASPIRIN 300 MG/1
300 SUPPOSITORY RECTAL DAILY
Status: DISCONTINUED | OUTPATIENT
Start: 2023-03-13 | End: 2023-03-21 | Stop reason: HOSPADM

## 2023-03-13 RX ORDER — VENLAFAXINE HYDROCHLORIDE 37.5 MG/1
75 CAPSULE, EXTENDED RELEASE ORAL DAILY
Status: DISCONTINUED | OUTPATIENT
Start: 2023-03-13 | End: 2023-03-21 | Stop reason: HOSPADM

## 2023-03-13 RX ORDER — ONDANSETRON 4 MG/1
4 TABLET, ORALLY DISINTEGRATING ORAL EVERY 8 HOURS PRN
Status: DISCONTINUED | OUTPATIENT
Start: 2023-03-13 | End: 2023-03-21 | Stop reason: HOSPADM

## 2023-03-13 RX ORDER — OXYBUTYNIN CHLORIDE 10 MG/1
10 TABLET, EXTENDED RELEASE ORAL DAILY
Status: DISCONTINUED | OUTPATIENT
Start: 2023-03-13 | End: 2023-03-21 | Stop reason: HOSPADM

## 2023-03-13 RX ORDER — POLYETHYLENE GLYCOL 3350 17 G/17G
17 POWDER, FOR SOLUTION ORAL DAILY PRN
Status: DISCONTINUED | OUTPATIENT
Start: 2023-03-13 | End: 2023-03-21 | Stop reason: HOSPADM

## 2023-03-13 RX ORDER — MEMANTINE HYDROCHLORIDE 5 MG/1
5 TABLET ORAL 2 TIMES DAILY
Status: DISCONTINUED | OUTPATIENT
Start: 2023-03-13 | End: 2023-03-21 | Stop reason: HOSPADM

## 2023-03-13 RX ORDER — FAMOTIDINE 20 MG/1
40 TABLET, FILM COATED ORAL EVERY EVENING
Status: DISCONTINUED | OUTPATIENT
Start: 2023-03-13 | End: 2023-03-15

## 2023-03-13 RX ADMIN — ATORVASTATIN CALCIUM 40 MG: 40 TABLET, FILM COATED ORAL at 01:32

## 2023-03-13 RX ADMIN — SODIUM CHLORIDE, POTASSIUM CHLORIDE, SODIUM LACTATE AND CALCIUM CHLORIDE: 600; 310; 30; 20 INJECTION, SOLUTION INTRAVENOUS at 05:27

## 2023-03-13 RX ADMIN — FAMOTIDINE 40 MG: 20 TABLET ORAL at 01:32

## 2023-03-13 RX ADMIN — FUROSEMIDE 40 MG: 40 TABLET ORAL at 09:31

## 2023-03-13 RX ADMIN — LORAZEPAM 0.5 MG: 2 INJECTION INTRAMUSCULAR; INTRAVENOUS at 15:57

## 2023-03-13 RX ADMIN — POTASSIUM CHLORIDE 40 MEQ: 1500 TABLET, EXTENDED RELEASE ORAL at 09:32

## 2023-03-13 RX ADMIN — MEROPENEM 1000 MG: 1 INJECTION, POWDER, FOR SOLUTION INTRAVENOUS at 17:34

## 2023-03-13 RX ADMIN — PANTOPRAZOLE SODIUM 20 MG: 20 TABLET, DELAYED RELEASE ORAL at 05:17

## 2023-03-13 RX ADMIN — MEMANTINE 5 MG: 5 TABLET ORAL at 21:07

## 2023-03-13 RX ADMIN — LEVOTHYROXINE SODIUM 100 MCG: 0.1 TABLET ORAL at 05:17

## 2023-03-13 RX ADMIN — ASPIRIN 81 MG: 81 TABLET, COATED ORAL at 09:31

## 2023-03-13 RX ADMIN — MEMANTINE 5 MG: 5 TABLET ORAL at 01:32

## 2023-03-13 RX ADMIN — POTASSIUM CHLORIDE 20 MEQ: 20 TABLET, EXTENDED RELEASE ORAL at 09:31

## 2023-03-13 RX ADMIN — MEROPENEM 1000 MG: 1 INJECTION, POWDER, FOR SOLUTION INTRAVENOUS at 05:27

## 2023-03-13 RX ADMIN — MEMANTINE 5 MG: 5 TABLET ORAL at 09:31

## 2023-03-13 RX ADMIN — VENLAFAXINE HYDROCHLORIDE 75 MG: 37.5 CAPSULE, EXTENDED RELEASE ORAL at 09:31

## 2023-03-13 RX ADMIN — ALLOPURINOL 100 MG: 100 TABLET ORAL at 09:31

## 2023-03-13 RX ADMIN — MONTELUKAST 10 MG: 10 TABLET, FILM COATED ORAL at 21:07

## 2023-03-13 RX ADMIN — FAMOTIDINE 40 MG: 20 TABLET ORAL at 21:07

## 2023-03-13 RX ADMIN — ATORVASTATIN CALCIUM 40 MG: 40 TABLET, FILM COATED ORAL at 21:07

## 2023-03-13 RX ADMIN — MONTELUKAST 10 MG: 10 TABLET, FILM COATED ORAL at 01:32

## 2023-03-13 RX ADMIN — OXYBUTYNIN CHLORIDE 10 MG: 10 TABLET, EXTENDED RELEASE ORAL at 09:31

## 2023-03-13 RX ADMIN — ENOXAPARIN SODIUM 40 MG: 100 INJECTION SUBCUTANEOUS at 09:32

## 2023-03-13 ASSESSMENT — PAIN SCALES - GENERAL
PAINLEVEL_OUTOF10: 0
PAINLEVEL_OUTOF10: 0

## 2023-03-13 NOTE — H&P
periventricular and subcortical white matter, which likely represent chronic microvascular ischemic change. ORBITS: The visualized portion of the orbits demonstrate no acute abnormality. SINUSES: The visualized paranasal sinuses and mastoid air cells demonstrate no acute abnormality. SOFT TISSUES/SKULL: No acute abnormality of the visualized skull or soft tissues. No acute intracranial abnormality. XR CHEST PORTABLE    Result Date: 3/12/2023  EXAMINATION: ONE XRAY VIEW OF THE CHEST 3/12/2023 3:05 pm COMPARISON: 08/26/2021. HISTORY: ORDERING SYSTEM PROVIDED HISTORY: Shortness of breath TECHNOLOGIST PROVIDED HISTORY: Reason for exam:->Shortness of breath FINDINGS: Calcified granulomata are again noted. There is no confluent airspace consolidation or effusion. The cardiomediastinal silhouette and pulmonary vessels appear normal.     Stable appearance of the chest with no acute findings. CTA HEAD NECK W CONTRAST    Result Date: 3/12/2023  EXAMINATION: CTA OF THE HEAD AND NECK WITH CONTRAST 3/12/2023 8:33 pm: TECHNIQUE: CTA of the head and neck was performed with the administration of intravenous contrast. Multiplanar reformatted images are provided for review. MIP images are provided for review. Stenosis of the internal carotid arteries measured using NASCET criteria. Automated exposure control, iterative reconstruction, and/or weight based adjustment of the mA/kV was utilized to reduce the radiation dose to as low as reasonably achievable. COMPARISON: Noncontrast CT head done same day. HISTORY: ORDERING SYSTEM PROVIDED HISTORY: LLE weakness, aphasia TECHNOLOGIST PROVIDED HISTORY: Reason for exam:->LLE weakness, aphasia Has a \"code stroke\" or \"stroke alert\" been called? ->No Decision Support Exception - unselect if not a suspected or confirmed emergency medical condition->Emergency Medical Condition (MA) Reason for Exam: LLE weakness, aphasia, 75 ml isovue 370 Additional signs and symptoms: LLE weakness,

## 2023-03-14 LAB
ANION GAP SERPL CALCULATED.3IONS-SCNC: 12 MMOL/L (ref 4–16)
BUN SERPL-MCNC: 12 MG/DL (ref 6–23)
CALCIUM SERPL-MCNC: 8.8 MG/DL (ref 8.3–10.6)
CHLORIDE BLD-SCNC: 104 MMOL/L (ref 99–110)
CO2: 28 MMOL/L (ref 21–32)
CREAT SERPL-MCNC: 0.8 MG/DL (ref 0.6–1.1)
GFR SERPL CREATININE-BSD FRML MDRD: >60 ML/MIN/1.73M2
GLUCOSE BLD-MCNC: 175 MG/DL (ref 70–99)
GLUCOSE SERPL-MCNC: 162 MG/DL (ref 70–99)
MAGNESIUM: 1.5 MG/DL (ref 1.8–2.4)
POTASSIUM SERPL-SCNC: 3.2 MMOL/L (ref 3.5–5.1)
SODIUM BLD-SCNC: 144 MMOL/L (ref 135–145)

## 2023-03-14 PROCEDURE — 99233 SBSQ HOSP IP/OBS HIGH 50: CPT | Performed by: NURSE PRACTITIONER

## 2023-03-14 PROCEDURE — 97535 SELF CARE MNGMENT TRAINING: CPT

## 2023-03-14 PROCEDURE — 6360000002 HC RX W HCPCS: Performed by: INTERNAL MEDICINE

## 2023-03-14 PROCEDURE — 36415 COLL VENOUS BLD VENIPUNCTURE: CPT

## 2023-03-14 PROCEDURE — 94761 N-INVAS EAR/PLS OXIMETRY MLT: CPT

## 2023-03-14 PROCEDURE — 83735 ASSAY OF MAGNESIUM: CPT

## 2023-03-14 PROCEDURE — 2580000003 HC RX 258: Performed by: INTERNAL MEDICINE

## 2023-03-14 PROCEDURE — 97166 OT EVAL MOD COMPLEX 45 MIN: CPT

## 2023-03-14 PROCEDURE — 97530 THERAPEUTIC ACTIVITIES: CPT

## 2023-03-14 PROCEDURE — 6370000000 HC RX 637 (ALT 250 FOR IP): Performed by: INTERNAL MEDICINE

## 2023-03-14 PROCEDURE — 97112 NEUROMUSCULAR REEDUCATION: CPT

## 2023-03-14 PROCEDURE — 97162 PT EVAL MOD COMPLEX 30 MIN: CPT

## 2023-03-14 PROCEDURE — 80048 BASIC METABOLIC PNL TOTAL CA: CPT

## 2023-03-14 PROCEDURE — 1200000000 HC SEMI PRIVATE

## 2023-03-14 PROCEDURE — 82962 GLUCOSE BLOOD TEST: CPT

## 2023-03-14 RX ADMIN — SODIUM CHLORIDE, POTASSIUM CHLORIDE, SODIUM LACTATE AND CALCIUM CHLORIDE: 600; 310; 30; 20 INJECTION, SOLUTION INTRAVENOUS at 00:23

## 2023-03-14 RX ADMIN — MEROPENEM 1000 MG: 1 INJECTION, POWDER, FOR SOLUTION INTRAVENOUS at 05:05

## 2023-03-14 RX ADMIN — MEROPENEM 1000 MG: 1 INJECTION, POWDER, FOR SOLUTION INTRAVENOUS at 16:46

## 2023-03-14 RX ADMIN — ENOXAPARIN SODIUM 40 MG: 100 INJECTION SUBCUTANEOUS at 08:53

## 2023-03-14 RX ADMIN — MEMANTINE 5 MG: 5 TABLET ORAL at 08:53

## 2023-03-14 RX ADMIN — ALLOPURINOL 100 MG: 100 TABLET ORAL at 08:53

## 2023-03-14 RX ADMIN — FAMOTIDINE 40 MG: 20 TABLET ORAL at 23:30

## 2023-03-14 RX ADMIN — POTASSIUM CHLORIDE 20 MEQ: 20 TABLET, EXTENDED RELEASE ORAL at 08:53

## 2023-03-14 RX ADMIN — VENLAFAXINE HYDROCHLORIDE 75 MG: 37.5 CAPSULE, EXTENDED RELEASE ORAL at 08:53

## 2023-03-14 RX ADMIN — OXYBUTYNIN CHLORIDE 10 MG: 10 TABLET, EXTENDED RELEASE ORAL at 08:53

## 2023-03-14 RX ADMIN — PANTOPRAZOLE SODIUM 20 MG: 20 TABLET, DELAYED RELEASE ORAL at 05:05

## 2023-03-14 RX ADMIN — MEMANTINE 5 MG: 5 TABLET ORAL at 23:30

## 2023-03-14 RX ADMIN — FUROSEMIDE 40 MG: 40 TABLET ORAL at 08:53

## 2023-03-14 RX ADMIN — ATORVASTATIN CALCIUM 40 MG: 40 TABLET, FILM COATED ORAL at 23:30

## 2023-03-14 RX ADMIN — ASPIRIN 81 MG: 81 TABLET, COATED ORAL at 08:53

## 2023-03-14 RX ADMIN — LEVOTHYROXINE SODIUM 100 MCG: 0.1 TABLET ORAL at 05:05

## 2023-03-14 RX ADMIN — MONTELUKAST 10 MG: 10 TABLET, FILM COATED ORAL at 23:30

## 2023-03-14 NOTE — CONSULTS
[facility], 8/3/1929, 3030/3030-A, 3/14/2023    History  [place]:  There were no encounter diagnoses. Patient  has a past medical history of ACP (advance care planning), Acute cystitis without hematuria, Allergic rhinitis, Anxiety, Arthritis, Bradycardia, Cellulitis, leg, Dementia ([address] Depression, Diabetes mellitus ([address] Diabetic eye exam ([address] Dysuria, Flu vaccine need, Gout, Hyperlipidemia, Hypertension, Hypothyroidism, and Positive depression screening. Patient  has a past surgical history that includes Thyroidectomy, partial; Carpal tunnel release; Ulnar tunnel release; hernia repair; Appendectomy (13 yrs ago); Hysterectomy; Colonoscopy; joint replacement; eye surgery; Endoscopy, colon, diagnostic (7/8/13); and hip surgery (Left, 08/22/2016). Subjective:  Patient states:  \"I'm light. . lightness (lightheaded)\" pt reports with standing. Pt with minimal verbalizations. Pain:  Pt without c/o. Communication with other providers:  Handoff to RN, co-eval with Tana MORATAYA  Restrictions: Contact precaution, fall risk, tele, IV, h/o dementia, VIOLET, general     Home Setup/Prior level of function  Social/Functional History  Lives With: Alone  Type of Home: Apartment  Home Layout: One level  Home Equipment: Rollator  ADL Assistance: Independent  Homemaking Assistance: Needs assistance (prepares simple meals and basic cleaning tasks, daughter assists with groceries, cleaning, and meal prep)  Ambulation Assistance: Independent (pt typically mod I with rollator)  Transfer Assistance: Independent  Active : No  Patient's  Info: daughter provides transportation  Occupation: Retired  Leisure & Hobbies: typically very active per daughter  Additional Comments: pt limited historian today, PLOF/history gathered from daughter at bedside.  Daughter states she lives down the hooks from pt in the same apartment building and that she has cameras
[address] [id number] Continence Nurse  Consult Note       Louis Rojas  AGE: 80 y.o. GENDER: female  : 8/3/1929  TODAY'S DATE:  3/13/2023    Subjective:     Reason for Evaluation and Assessment: wound care nic Rojas is a 80 y.o. female referred by:   [x] Physician  [] Nursing  [] Other:     Wound Identification:  Wound Type: pressure  Contributing Factors: diabetes, chronic pressure, decreased mobility, and shear force        PAST MEDICAL HISTORY        Diagnosis Date    ACP (advance care planning) 8/10/2020    Acute cystitis without hematuria 2021    Allergic rhinitis     Anxiety     Arthritis     Bradycardia     Cellulitis, leg 2012    Dementia ([facility]     Depression     Diabetes mellitus ([facility]     sugars controlled off meds    Diabetic eye exam ([facility] 16    no retinopathy    Dysuria 2020    Flu vaccine need 2020    Gout     Hyperlipidemia     Hypertension     Hypothyroidism     Positive depression screening 2021       PAST SURGICAL HISTORY    Past Surgical History:   Procedure Laterality Date    APPENDECTOMY  13 yrs ago    CARPAL TUNNEL RELEASE      bilateral    COLONOSCOPY      ENDOSCOPY, COLON, DIAGNOSTIC  13    gastritis, hiatal hernia    EYE SURGERY      both eyes    HERNIA REPAIR      umbilical    HIP SURGERY Left 2016    ORIF    HYSTERECTOMY (CERVIX STATUS UNKNOWN)      complete    JOINT REPLACEMENT      left knee    THYROIDECTOMY, PARTIAL      ULNAR TUNNEL RELEASE      bilateral       FAMILY HISTORY    Family History   Problem Relation Age of Onset    Heart Disease Father     Cancer Brother     Heart Attack Brother        SOCIAL HISTORY    Social History     Tobacco Use    Smoking status: Never    Smokeless tobacco: Never   Vaping Use    Vaping Use: Never used   Substance Use Topics    Alcohol use: Yes     Alcohol/week: 1.0 standard drink     Types: 1 Cans of beer per week     Comment: may be less than once weekly    Drug use:  No
55-60%. Sclerotic, but non-stenotic aortic valve. Mild aortic regurgitation; PHT: 652 msec. Trace tricuspid regurgitation; RVSP: 19 mmHg. No evidence of any pericardial effusion. Neg Bubble Study. All imaging was personally reviewed   ASSESSMENT/PLAN:   26-year-old female presenting with altered mental status, aphasia. Symptoms started to resolve yesterday afternoon and patient has returned to baseline today. She is alert and oriented x4. There is some processing delay but patient is appropriate on exam.  She is able to follow commands appropriately. Speech is clear, language is fluent. Strength and sensation are intact in the upper and lower extremities. Patient does have a history of dementia and per daughter at bedside has had continued decline in cognitive status. Aphasia, left lower extremity weakness, confusion secondary to acute stroke v metabolic encephalopathy given UTI, hypokalemia  Neuroimaging as above-nonacute  MRI brain-pending  TTE as above-noncontributory  Continue aspirin, statin for secondary stroke prevention    A1c 6.1, MG 1.8, ammonia WNL, TSH WNL 11/2022  K3.2, replacement per IM  UTI on meropenem, management per IM  PT/OT as recommended  Patient may benefit from rehab at discharge  We will continue to follow pending MRI results with further recommendations. Patient was discussed with attending neurologist Dr. Amanda Reinoso      Thank you for allowing us to participate in the care of your patient. If there are any questions regarding evaluation please feel free to contact us.      CARLITO Sanchez - MYRA, 3/13/2023

## 2023-03-15 LAB
CULTURE: ABNORMAL
CULTURE: ABNORMAL
EKG ATRIAL RATE: 75 BPM
EKG DIAGNOSIS: NORMAL
EKG P AXIS: 83 DEGREES
EKG P-R INTERVAL: 162 MS
EKG Q-T INTERVAL: 408 MS
EKG QRS DURATION: 96 MS
EKG QTC CALCULATION (BAZETT): 455 MS
EKG R AXIS: -53 DEGREES
EKG T AXIS: 38 DEGREES
EKG VENTRICULAR RATE: 75 BPM
Lab: ABNORMAL
SPECIMEN: ABNORMAL

## 2023-03-15 PROCEDURE — 6360000002 HC RX W HCPCS: Performed by: INTERNAL MEDICINE

## 2023-03-15 PROCEDURE — 97535 SELF CARE MNGMENT TRAINING: CPT

## 2023-03-15 PROCEDURE — 2580000003 HC RX 258: Performed by: INTERNAL MEDICINE

## 2023-03-15 PROCEDURE — 94761 N-INVAS EAR/PLS OXIMETRY MLT: CPT

## 2023-03-15 PROCEDURE — 97116 GAIT TRAINING THERAPY: CPT

## 2023-03-15 PROCEDURE — 93010 ELECTROCARDIOGRAM REPORT: CPT | Performed by: INTERNAL MEDICINE

## 2023-03-15 PROCEDURE — 6370000000 HC RX 637 (ALT 250 FOR IP): Performed by: INTERNAL MEDICINE

## 2023-03-15 PROCEDURE — 1200000000 HC SEMI PRIVATE

## 2023-03-15 PROCEDURE — 97530 THERAPEUTIC ACTIVITIES: CPT

## 2023-03-15 RX ORDER — MAGNESIUM SULFATE 4 G/50ML
4000 INJECTION INTRAVENOUS ONCE
Status: COMPLETED | OUTPATIENT
Start: 2023-03-15 | End: 2023-03-15

## 2023-03-15 RX ORDER — FAMOTIDINE 20 MG/1
20 TABLET, FILM COATED ORAL EVERY EVENING
Status: DISCONTINUED | OUTPATIENT
Start: 2023-03-15 | End: 2023-03-21 | Stop reason: HOSPADM

## 2023-03-15 RX ORDER — ZIPRASIDONE MESYLATE 20 MG/ML
10 INJECTION, POWDER, LYOPHILIZED, FOR SOLUTION INTRAMUSCULAR ONCE
Status: DISCONTINUED | OUTPATIENT
Start: 2023-03-15 | End: 2023-03-21 | Stop reason: HOSPADM

## 2023-03-15 RX ADMIN — MEROPENEM 1000 MG: 1 INJECTION, POWDER, FOR SOLUTION INTRAVENOUS at 16:06

## 2023-03-15 RX ADMIN — ENOXAPARIN SODIUM 40 MG: 100 INJECTION SUBCUTANEOUS at 09:10

## 2023-03-15 RX ADMIN — SODIUM CHLORIDE, POTASSIUM CHLORIDE, SODIUM LACTATE AND CALCIUM CHLORIDE: 600; 310; 30; 20 INJECTION, SOLUTION INTRAVENOUS at 17:07

## 2023-03-15 RX ADMIN — LEVOTHYROXINE SODIUM 100 MCG: 0.1 TABLET ORAL at 09:10

## 2023-03-15 RX ADMIN — ATORVASTATIN CALCIUM 40 MG: 40 TABLET, FILM COATED ORAL at 22:20

## 2023-03-15 RX ADMIN — FAMOTIDINE 20 MG: 20 TABLET ORAL at 22:20

## 2023-03-15 RX ADMIN — MAGNESIUM SULFATE HEPTAHYDRATE 4000 MG: 4 INJECTION, SOLUTION INTRAVENOUS at 09:13

## 2023-03-15 RX ADMIN — POTASSIUM BICARBONATE 40 MEQ: 782 TABLET, EFFERVESCENT ORAL at 09:10

## 2023-03-15 RX ADMIN — AMPICILLIN SODIUM 500 MG: 500 INJECTION, POWDER, FOR SOLUTION INTRAMUSCULAR; INTRAVENOUS at 17:42

## 2023-03-15 RX ADMIN — PANTOPRAZOLE SODIUM 20 MG: 20 TABLET, DELAYED RELEASE ORAL at 09:10

## 2023-03-15 RX ADMIN — VENLAFAXINE HYDROCHLORIDE 75 MG: 37.5 CAPSULE, EXTENDED RELEASE ORAL at 09:10

## 2023-03-15 RX ADMIN — MEROPENEM 1000 MG: 1 INJECTION, POWDER, FOR SOLUTION INTRAVENOUS at 05:29

## 2023-03-15 RX ADMIN — POTASSIUM CHLORIDE 20 MEQ: 20 TABLET, EXTENDED RELEASE ORAL at 09:10

## 2023-03-15 RX ADMIN — ASPIRIN 81 MG: 81 TABLET, COATED ORAL at 09:10

## 2023-03-15 RX ADMIN — FUROSEMIDE 40 MG: 40 TABLET ORAL at 09:10

## 2023-03-15 RX ADMIN — MONTELUKAST 10 MG: 10 TABLET, FILM COATED ORAL at 22:20

## 2023-03-15 RX ADMIN — OXYBUTYNIN CHLORIDE 10 MG: 10 TABLET, EXTENDED RELEASE ORAL at 09:10

## 2023-03-15 RX ADMIN — ALLOPURINOL 100 MG: 100 TABLET ORAL at 09:10

## 2023-03-15 RX ADMIN — MEMANTINE 5 MG: 5 TABLET ORAL at 22:20

## 2023-03-15 RX ADMIN — MEMANTINE 5 MG: 5 TABLET ORAL at 09:10

## 2023-03-16 LAB
ANION GAP SERPL CALCULATED.3IONS-SCNC: 11 MMOL/L (ref 4–16)
BUN SERPL-MCNC: 13 MG/DL (ref 6–23)
CALCIUM SERPL-MCNC: 8.3 MG/DL (ref 8.3–10.6)
CHLORIDE BLD-SCNC: 102 MMOL/L (ref 99–110)
CO2: 29 MMOL/L (ref 21–32)
CREAT SERPL-MCNC: 0.9 MG/DL (ref 0.6–1.1)
CULTURE: ABNORMAL
GFR SERPL CREATININE-BSD FRML MDRD: 60 ML/MIN/1.73M2
GLUCOSE SERPL-MCNC: 114 MG/DL (ref 70–99)
Lab: ABNORMAL
Lab: ABNORMAL
MAGNESIUM: 2.7 MG/DL (ref 1.8–2.4)
POTASSIUM SERPL-SCNC: 3.3 MMOL/L (ref 3.5–5.1)
SODIUM BLD-SCNC: 142 MMOL/L (ref 135–145)
SPECIMEN: ABNORMAL
SPECIMEN: ABNORMAL

## 2023-03-16 PROCEDURE — 6360000002 HC RX W HCPCS: Performed by: INTERNAL MEDICINE

## 2023-03-16 PROCEDURE — 6370000000 HC RX 637 (ALT 250 FOR IP): Performed by: INTERNAL MEDICINE

## 2023-03-16 PROCEDURE — 1200000000 HC SEMI PRIVATE

## 2023-03-16 PROCEDURE — 80048 BASIC METABOLIC PNL TOTAL CA: CPT

## 2023-03-16 PROCEDURE — 83735 ASSAY OF MAGNESIUM: CPT

## 2023-03-16 PROCEDURE — 36415 COLL VENOUS BLD VENIPUNCTURE: CPT

## 2023-03-16 PROCEDURE — 2580000003 HC RX 258: Performed by: INTERNAL MEDICINE

## 2023-03-16 RX ADMIN — ATORVASTATIN CALCIUM 40 MG: 40 TABLET, FILM COATED ORAL at 20:42

## 2023-03-16 RX ADMIN — AMPICILLIN SODIUM 500 MG: 500 INJECTION, POWDER, FOR SOLUTION INTRAMUSCULAR; INTRAVENOUS at 06:34

## 2023-03-16 RX ADMIN — AMPICILLIN SODIUM 500 MG: 500 INJECTION, POWDER, FOR SOLUTION INTRAMUSCULAR; INTRAVENOUS at 17:48

## 2023-03-16 RX ADMIN — AMPICILLIN SODIUM 500 MG: 500 INJECTION, POWDER, FOR SOLUTION INTRAMUSCULAR; INTRAVENOUS at 12:25

## 2023-03-16 RX ADMIN — FUROSEMIDE 40 MG: 40 TABLET ORAL at 09:51

## 2023-03-16 RX ADMIN — AMPICILLIN SODIUM 500 MG: 500 INJECTION, POWDER, FOR SOLUTION INTRAMUSCULAR; INTRAVENOUS at 23:47

## 2023-03-16 RX ADMIN — POTASSIUM CHLORIDE 20 MEQ: 20 TABLET, EXTENDED RELEASE ORAL at 09:52

## 2023-03-16 RX ADMIN — OXYBUTYNIN CHLORIDE 10 MG: 10 TABLET, EXTENDED RELEASE ORAL at 09:50

## 2023-03-16 RX ADMIN — MEMANTINE 5 MG: 5 TABLET ORAL at 20:42

## 2023-03-16 RX ADMIN — ALLOPURINOL 100 MG: 100 TABLET ORAL at 09:51

## 2023-03-16 RX ADMIN — POTASSIUM BICARBONATE 40 MEQ: 782 TABLET, EFFERVESCENT ORAL at 20:42

## 2023-03-16 RX ADMIN — SODIUM CHLORIDE, POTASSIUM CHLORIDE, SODIUM LACTATE AND CALCIUM CHLORIDE: 600; 310; 30; 20 INJECTION, SOLUTION INTRAVENOUS at 06:33

## 2023-03-16 RX ADMIN — VENLAFAXINE HYDROCHLORIDE 75 MG: 37.5 CAPSULE, EXTENDED RELEASE ORAL at 09:51

## 2023-03-16 RX ADMIN — AMPICILLIN SODIUM 500 MG: 500 INJECTION, POWDER, FOR SOLUTION INTRAMUSCULAR; INTRAVENOUS at 00:44

## 2023-03-16 RX ADMIN — MEMANTINE 5 MG: 5 TABLET ORAL at 09:51

## 2023-03-16 RX ADMIN — MONTELUKAST 10 MG: 10 TABLET, FILM COATED ORAL at 20:42

## 2023-03-16 RX ADMIN — SODIUM CHLORIDE, POTASSIUM CHLORIDE, SODIUM LACTATE AND CALCIUM CHLORIDE: 600; 310; 30; 20 INJECTION, SOLUTION INTRAVENOUS at 20:49

## 2023-03-16 RX ADMIN — LEVOTHYROXINE SODIUM 100 MCG: 0.1 TABLET ORAL at 06:33

## 2023-03-16 RX ADMIN — PANTOPRAZOLE SODIUM 20 MG: 20 TABLET, DELAYED RELEASE ORAL at 06:33

## 2023-03-16 RX ADMIN — ASPIRIN 81 MG: 81 TABLET, COATED ORAL at 09:50

## 2023-03-16 RX ADMIN — FAMOTIDINE 20 MG: 20 TABLET ORAL at 20:42

## 2023-03-16 RX ADMIN — ENOXAPARIN SODIUM 40 MG: 100 INJECTION SUBCUTANEOUS at 09:50

## 2023-03-16 RX ADMIN — POTASSIUM BICARBONATE 40 MEQ: 782 TABLET, EFFERVESCENT ORAL at 09:51

## 2023-03-16 NOTE — DISCHARGE INSTR - COC
ml   Output --   Net 250 ml     No intake/output data recorded. Safety Concerns:     Sundowners [place] and At Risk for Falls    Impairments/Disabilities:      None      Patient's personal belongings (please select all that are sent with patient):  None    RN SIGNATURE:  Electronically signed by Nori Weaver RN on 3/21/23 at 2:35 PM EDT      PHYSICIAN SECTION    Prognosis: Good    Condition at Discharge: Stable    Rehab Potential (if transferring to Rehab): Fair    Recommended Labs or Other Treatments After Discharge:   Wound Care: Bilateral Buttock redness: Cleanse with saline. Cover with sacral border Daily and PRN    Nutrition Therapy:  Current Nutrition Therapy:   - Oral Diet:  General    Routes of Feeding: Oral  Liquids: No Restrictions  Daily Fluid Restriction: no  Last Modified Barium Swallow with Video (Video Swallowing Test): not done    Treatments at the Time of Hospital Discharge:   Respiratory Treatments: None  Oxygen Therapy:  is not on home oxygen therapy. Ventilator:    - No ventilator support    Rehab Therapies: Physical Therapy and Occupational Therapy  Weight Bearing Status/Restrictions: No weight bearing restrictions  Other Medical Equipment (for information only, NOT a DME order):  Per PT/OT      Physician Certification: I certify the above information and transfer of Allen Martinez  is necessary for the continuing treatment of the diagnosis listed and that she requires [facility] for less 30 days. Update Admission H&P: Changes in H&P as follows -   #Strokelike symptoms: aphasia, LLE weakness-resolved  #Metabolic encephalopathy in the setting of electrolyte abnormalities and UTI-resolved  -Last well-known-unknown, Stroke Alert was called at [place] ED   -Patient had aphasia, left lower extremity weakness in ED. Aphasia has resolved.   Patient is able to communicate clearly though slightly sleepy on my assessment earlier today, still has left lower extremity weakness

## 2023-03-17 LAB
ANION GAP SERPL CALCULATED.3IONS-SCNC: 10 MMOL/L (ref 4–16)
BUN SERPL-MCNC: 10 MG/DL (ref 6–23)
CALCIUM SERPL-MCNC: 8.2 MG/DL (ref 8.3–10.6)
CHLORIDE BLD-SCNC: 103 MMOL/L (ref 99–110)
CO2: 28 MMOL/L (ref 21–32)
CREAT SERPL-MCNC: 0.7 MG/DL (ref 0.6–1.1)
GFR SERPL CREATININE-BSD FRML MDRD: >60 ML/MIN/1.73M2
GLUCOSE SERPL-MCNC: 107 MG/DL (ref 70–99)
MAGNESIUM: 1.9 MG/DL (ref 1.8–2.4)
POTASSIUM SERPL-SCNC: 3.9 MMOL/L (ref 3.5–5.1)
SODIUM BLD-SCNC: 141 MMOL/L (ref 135–145)

## 2023-03-17 PROCEDURE — 97530 THERAPEUTIC ACTIVITIES: CPT

## 2023-03-17 PROCEDURE — 6360000002 HC RX W HCPCS: Performed by: INTERNAL MEDICINE

## 2023-03-17 PROCEDURE — 80048 BASIC METABOLIC PNL TOTAL CA: CPT

## 2023-03-17 PROCEDURE — 6370000000 HC RX 637 (ALT 250 FOR IP): Performed by: INTERNAL MEDICINE

## 2023-03-17 PROCEDURE — 94761 N-INVAS EAR/PLS OXIMETRY MLT: CPT

## 2023-03-17 PROCEDURE — 2580000003 HC RX 258: Performed by: INTERNAL MEDICINE

## 2023-03-17 PROCEDURE — 83735 ASSAY OF MAGNESIUM: CPT

## 2023-03-17 PROCEDURE — 97116 GAIT TRAINING THERAPY: CPT

## 2023-03-17 PROCEDURE — 1200000000 HC SEMI PRIVATE

## 2023-03-17 PROCEDURE — 36415 COLL VENOUS BLD VENIPUNCTURE: CPT

## 2023-03-17 RX ADMIN — PANTOPRAZOLE SODIUM 20 MG: 20 TABLET, DELAYED RELEASE ORAL at 06:03

## 2023-03-17 RX ADMIN — ENOXAPARIN SODIUM 40 MG: 100 INJECTION SUBCUTANEOUS at 07:45

## 2023-03-17 RX ADMIN — OXYBUTYNIN CHLORIDE 10 MG: 10 TABLET, EXTENDED RELEASE ORAL at 07:46

## 2023-03-17 RX ADMIN — AMPICILLIN SODIUM 500 MG: 500 INJECTION, POWDER, FOR SOLUTION INTRAMUSCULAR; INTRAVENOUS at 18:20

## 2023-03-17 RX ADMIN — AMPICILLIN SODIUM 500 MG: 500 INJECTION, POWDER, FOR SOLUTION INTRAMUSCULAR; INTRAVENOUS at 06:06

## 2023-03-17 RX ADMIN — FUROSEMIDE 40 MG: 40 TABLET ORAL at 07:46

## 2023-03-17 RX ADMIN — POTASSIUM CHLORIDE 20 MEQ: 20 TABLET, EXTENDED RELEASE ORAL at 07:46

## 2023-03-17 RX ADMIN — AMPICILLIN SODIUM 500 MG: 500 INJECTION, POWDER, FOR SOLUTION INTRAMUSCULAR; INTRAVENOUS at 11:29

## 2023-03-17 RX ADMIN — MEMANTINE 5 MG: 5 TABLET ORAL at 07:46

## 2023-03-17 RX ADMIN — ALLOPURINOL 100 MG: 100 TABLET ORAL at 07:46

## 2023-03-17 RX ADMIN — MEMANTINE 5 MG: 5 TABLET ORAL at 21:03

## 2023-03-17 RX ADMIN — LEVOTHYROXINE SODIUM 100 MCG: 0.1 TABLET ORAL at 06:03

## 2023-03-17 RX ADMIN — ASPIRIN 81 MG: 81 TABLET, COATED ORAL at 07:46

## 2023-03-17 RX ADMIN — ATORVASTATIN CALCIUM 40 MG: 40 TABLET, FILM COATED ORAL at 21:03

## 2023-03-17 RX ADMIN — VENLAFAXINE HYDROCHLORIDE 75 MG: 37.5 CAPSULE, EXTENDED RELEASE ORAL at 07:45

## 2023-03-17 RX ADMIN — MONTELUKAST 10 MG: 10 TABLET, FILM COATED ORAL at 21:03

## 2023-03-17 RX ADMIN — FAMOTIDINE 20 MG: 20 TABLET ORAL at 21:03

## 2023-03-17 RX ADMIN — SODIUM CHLORIDE, POTASSIUM CHLORIDE, SODIUM LACTATE AND CALCIUM CHLORIDE: 600; 310; 30; 20 INJECTION, SOLUTION INTRAVENOUS at 11:29

## 2023-03-18 LAB
ANION GAP SERPL CALCULATED.3IONS-SCNC: 6 MMOL/L (ref 4–16)
BUN SERPL-MCNC: 11 MG/DL (ref 6–23)
CALCIUM SERPL-MCNC: 7.9 MG/DL (ref 8.3–10.6)
CHLORIDE BLD-SCNC: 104 MMOL/L (ref 99–110)
CO2: 28 MMOL/L (ref 21–32)
CREAT SERPL-MCNC: 0.7 MG/DL (ref 0.6–1.1)
GFR SERPL CREATININE-BSD FRML MDRD: >60 ML/MIN/1.73M2
GLUCOSE SERPL-MCNC: 123 MG/DL (ref 70–99)
MAGNESIUM: 1.7 MG/DL (ref 1.8–2.4)
POTASSIUM SERPL-SCNC: 3.9 MMOL/L (ref 3.5–5.1)
SODIUM BLD-SCNC: 138 MMOL/L (ref 135–145)

## 2023-03-18 PROCEDURE — 6360000002 HC RX W HCPCS: Performed by: INTERNAL MEDICINE

## 2023-03-18 PROCEDURE — 83735 ASSAY OF MAGNESIUM: CPT

## 2023-03-18 PROCEDURE — 6370000000 HC RX 637 (ALT 250 FOR IP): Performed by: INTERNAL MEDICINE

## 2023-03-18 PROCEDURE — 94761 N-INVAS EAR/PLS OXIMETRY MLT: CPT

## 2023-03-18 PROCEDURE — 6360000002 HC RX W HCPCS: Performed by: NURSE PRACTITIONER

## 2023-03-18 PROCEDURE — 2580000003 HC RX 258: Performed by: INTERNAL MEDICINE

## 2023-03-18 PROCEDURE — 80048 BASIC METABOLIC PNL TOTAL CA: CPT

## 2023-03-18 PROCEDURE — 36415 COLL VENOUS BLD VENIPUNCTURE: CPT

## 2023-03-18 PROCEDURE — 1200000000 HC SEMI PRIVATE

## 2023-03-18 RX ORDER — CALCIUM GLUCONATE 20 MG/ML
2000 INJECTION, SOLUTION INTRAVENOUS ONCE
Status: COMPLETED | OUTPATIENT
Start: 2023-03-18 | End: 2023-03-18

## 2023-03-18 RX ORDER — MAGNESIUM SULFATE IN WATER 40 MG/ML
2000 INJECTION, SOLUTION INTRAVENOUS ONCE
Status: DISCONTINUED | OUTPATIENT
Start: 2023-03-18 | End: 2023-03-18

## 2023-03-18 RX ORDER — MAGNESIUM SULFATE IN WATER 40 MG/ML
2000 INJECTION, SOLUTION INTRAVENOUS ONCE
Status: COMPLETED | OUTPATIENT
Start: 2023-03-18 | End: 2023-03-18

## 2023-03-18 RX ADMIN — MEMANTINE 5 MG: 5 TABLET ORAL at 20:42

## 2023-03-18 RX ADMIN — AMPICILLIN SODIUM 500 MG: 500 INJECTION, POWDER, FOR SOLUTION INTRAMUSCULAR; INTRAVENOUS at 17:54

## 2023-03-18 RX ADMIN — FUROSEMIDE 40 MG: 40 TABLET ORAL at 09:08

## 2023-03-18 RX ADMIN — MAGNESIUM SULFATE HEPTAHYDRATE 2000 MG: 2 INJECTION, SOLUTION INTRAVENOUS at 04:17

## 2023-03-18 RX ADMIN — POTASSIUM CHLORIDE 20 MEQ: 20 TABLET, EXTENDED RELEASE ORAL at 09:08

## 2023-03-18 RX ADMIN — LEVOTHYROXINE SODIUM 100 MCG: 0.1 TABLET ORAL at 05:42

## 2023-03-18 RX ADMIN — ASPIRIN 81 MG: 81 TABLET, COATED ORAL at 09:08

## 2023-03-18 RX ADMIN — ATORVASTATIN CALCIUM 40 MG: 40 TABLET, FILM COATED ORAL at 20:42

## 2023-03-18 RX ADMIN — CALCIUM GLUCONATE 2000 MG: 20 INJECTION, SOLUTION INTRAVENOUS at 06:29

## 2023-03-18 RX ADMIN — PANTOPRAZOLE SODIUM 20 MG: 20 TABLET, DELAYED RELEASE ORAL at 05:42

## 2023-03-18 RX ADMIN — MONTELUKAST 10 MG: 10 TABLET, FILM COATED ORAL at 20:42

## 2023-03-18 RX ADMIN — VENLAFAXINE HYDROCHLORIDE 75 MG: 37.5 CAPSULE, EXTENDED RELEASE ORAL at 09:08

## 2023-03-18 RX ADMIN — AMPICILLIN SODIUM 500 MG: 500 INJECTION, POWDER, FOR SOLUTION INTRAMUSCULAR; INTRAVENOUS at 23:04

## 2023-03-18 RX ADMIN — ENOXAPARIN SODIUM 40 MG: 100 INJECTION SUBCUTANEOUS at 09:08

## 2023-03-18 RX ADMIN — SODIUM CHLORIDE, POTASSIUM CHLORIDE, SODIUM LACTATE AND CALCIUM CHLORIDE: 600; 310; 30; 20 INJECTION, SOLUTION INTRAVENOUS at 00:10

## 2023-03-18 RX ADMIN — ALLOPURINOL 100 MG: 100 TABLET ORAL at 09:09

## 2023-03-18 RX ADMIN — AMPICILLIN SODIUM 500 MG: 500 INJECTION, POWDER, FOR SOLUTION INTRAMUSCULAR; INTRAVENOUS at 00:15

## 2023-03-18 RX ADMIN — MEMANTINE 5 MG: 5 TABLET ORAL at 09:08

## 2023-03-18 RX ADMIN — AMPICILLIN SODIUM 500 MG: 500 INJECTION, POWDER, FOR SOLUTION INTRAMUSCULAR; INTRAVENOUS at 05:41

## 2023-03-18 RX ADMIN — OXYBUTYNIN CHLORIDE 10 MG: 10 TABLET, EXTENDED RELEASE ORAL at 09:08

## 2023-03-18 RX ADMIN — SODIUM CHLORIDE, POTASSIUM CHLORIDE, SODIUM LACTATE AND CALCIUM CHLORIDE: 600; 310; 30; 20 INJECTION, SOLUTION INTRAVENOUS at 15:39

## 2023-03-18 RX ADMIN — FAMOTIDINE 20 MG: 20 TABLET ORAL at 20:42

## 2023-03-18 RX ADMIN — AMPICILLIN SODIUM 500 MG: 500 INJECTION, POWDER, FOR SOLUTION INTRAMUSCULAR; INTRAVENOUS at 12:26

## 2023-03-18 ASSESSMENT — PAIN SCALES - GENERAL
PAINLEVEL_OUTOF10: 0
PAINLEVEL_OUTOF10: 0

## 2023-03-18 NOTE — PLAN OF CARE
Problem: Discharge Planning  Goal: Discharge to home or other facility with appropriate resources  3/18/2023 1243 by Zakia Silva RN  Outcome: Progressing  3/18/2023 0159 by Leora Truong RN  Outcome: Progressing     Problem: Skin/Tissue Integrity  Goal: Absence of new skin breakdown  Description: 1. Monitor for areas of redness and/or skin breakdown  2. Assess vascular access sites hourly  3. Every 4-6 hours minimum:  Change oxygen saturation probe site  4. Every 4-6 hours:  If on nasal continuous positive airway pressure, respiratory therapy assess nares and determine need for appliance change or resting period.   3/18/2023 1243 by Zakia Silva RN  Outcome: Progressing  3/18/2023 0159 by Leora Truong RN  Outcome: Progressing     Problem: Safety - Adult  Goal: Free from fall injury  3/18/2023 1243 by Zakia Silva RN  Outcome: Progressing  3/18/2023 0159 by Leora Truong RN  Outcome: Progressing     Problem: ABCDS Injury Assessment  Goal: Absence of physical injury  3/18/2023 1243 by Zakia Silva RN  Outcome: Progressing  3/18/2023 0159 by Leora Truong RN  Outcome: Progressing     Problem: Pain  Goal: Verbalizes/displays adequate comfort level or baseline comfort level  3/18/2023 1243 by Zakia Silva RN  Outcome: Progressing  3/18/2023 0159 by Leora Truong RN  Outcome: Progressing     Problem: Chronic Conditions and Co-morbidities  Goal: Patient's chronic conditions and co-morbidity symptoms are monitored and maintained or improved  3/18/2023 1243 by Zakia Silva RN  Outcome: Progressing  3/18/2023 0159 by Leora Truong RN  Outcome: Progressing

## 2023-03-19 ENCOUNTER — APPOINTMENT (OUTPATIENT)
Dept: GENERAL RADIOLOGY | Age: 88
DRG: 689 | End: 2023-03-19
Attending: STUDENT IN AN ORGANIZED HEALTH CARE EDUCATION/TRAINING PROGRAM
Payer: MEDICARE

## 2023-03-19 ENCOUNTER — APPOINTMENT (OUTPATIENT)
Dept: CT IMAGING | Age: 88
DRG: 689 | End: 2023-03-19
Attending: STUDENT IN AN ORGANIZED HEALTH CARE EDUCATION/TRAINING PROGRAM
Payer: MEDICARE

## 2023-03-19 LAB
ANION GAP SERPL CALCULATED.3IONS-SCNC: 8 MMOL/L (ref 4–16)
ANION GAP SERPL CALCULATED.3IONS-SCNC: 9 MMOL/L (ref 4–16)
BASOPHILS ABSOLUTE: 0 K/CU MM
BASOPHILS RELATIVE PERCENT: 0.5 % (ref 0–1)
BILIRUBIN URINE: NEGATIVE MG/DL
BLOOD, URINE: NEGATIVE
BUN SERPL-MCNC: 10 MG/DL (ref 6–23)
BUN SERPL-MCNC: 8 MG/DL (ref 6–23)
CALCIUM SERPL-MCNC: 8.2 MG/DL (ref 8.3–10.6)
CALCIUM SERPL-MCNC: 8.4 MG/DL (ref 8.3–10.6)
CHLORIDE BLD-SCNC: 102 MMOL/L (ref 99–110)
CHLORIDE BLD-SCNC: 103 MMOL/L (ref 99–110)
CLARITY: CLEAR
CO2: 28 MMOL/L (ref 21–32)
CO2: 29 MMOL/L (ref 21–32)
COLOR: YELLOW
COMMENT UA: NORMAL
CREAT SERPL-MCNC: 0.6 MG/DL (ref 0.6–1.1)
CREAT SERPL-MCNC: 0.7 MG/DL (ref 0.6–1.1)
DIFFERENTIAL TYPE: ABNORMAL
EOSINOPHILS ABSOLUTE: 0.2 K/CU MM
EOSINOPHILS RELATIVE PERCENT: 4.3 % (ref 0–3)
GFR SERPL CREATININE-BSD FRML MDRD: >60 ML/MIN/1.73M2
GFR SERPL CREATININE-BSD FRML MDRD: >60 ML/MIN/1.73M2
GLUCOSE BLD-MCNC: 123 MG/DL (ref 70–99)
GLUCOSE SERPL-MCNC: 102 MG/DL (ref 70–99)
GLUCOSE SERPL-MCNC: 108 MG/DL (ref 70–99)
GLUCOSE, URINE: NEGATIVE MG/DL
HCT VFR BLD CALC: 31.3 % (ref 37–47)
HEMOGLOBIN: 10.1 GM/DL (ref 12.5–16)
IMMATURE NEUTROPHIL %: 0.2 % (ref 0–0.43)
KETONES, URINE: NEGATIVE MG/DL
LEUKOCYTE ESTERASE, URINE: NEGATIVE
LYMPHOCYTES ABSOLUTE: 1.2 K/CU MM
LYMPHOCYTES RELATIVE PERCENT: 27.6 % (ref 24–44)
MAGNESIUM: 1.7 MG/DL (ref 1.8–2.4)
MAGNESIUM: 1.8 MG/DL (ref 1.8–2.4)
MCH RBC QN AUTO: 30.2 PG (ref 27–31)
MCHC RBC AUTO-ENTMCNC: 32.3 % (ref 32–36)
MCV RBC AUTO: 93.7 FL (ref 78–100)
MONOCYTES ABSOLUTE: 0.3 K/CU MM
MONOCYTES RELATIVE PERCENT: 7.7 % (ref 0–4)
NITRITE URINE, QUANTITATIVE: NEGATIVE
NUCLEATED RBC %: 0 %
PDW BLD-RTO: 13.2 % (ref 11.7–14.9)
PH, URINE: 8 (ref 5–8)
PHOSPHORUS: 2.4 MG/DL (ref 2.5–4.9)
PLATELET # BLD: 117 K/CU MM (ref 140–440)
PMV BLD AUTO: 11.2 FL (ref 7.5–11.1)
POTASSIUM SERPL-SCNC: 3.7 MMOL/L (ref 3.5–5.1)
POTASSIUM SERPL-SCNC: 3.9 MMOL/L (ref 3.5–5.1)
PROCALCITONIN SERPL-MCNC: 0.07 NG/ML
PROTEIN UA: NEGATIVE MG/DL
RBC # BLD: 3.34 M/CU MM (ref 4.2–5.4)
SEGMENTED NEUTROPHILS ABSOLUTE COUNT: 2.6 K/CU MM
SEGMENTED NEUTROPHILS RELATIVE PERCENT: 59.7 % (ref 36–66)
SODIUM BLD-SCNC: 139 MMOL/L (ref 135–145)
SODIUM BLD-SCNC: 140 MMOL/L (ref 135–145)
SPECIFIC GRAVITY UA: 1.01 (ref 1–1.03)
TOTAL IMMATURE NEUTOROPHIL: 0.01 K/CU MM
TOTAL NUCLEATED RBC: 0 K/CU MM
UROBILINOGEN, URINE: 0.2 MG/DL (ref 0.2–1)
WBC # BLD: 4.4 K/CU MM (ref 4–10.5)

## 2023-03-19 PROCEDURE — 6360000002 HC RX W HCPCS: Performed by: INTERNAL MEDICINE

## 2023-03-19 PROCEDURE — 80048 BASIC METABOLIC PNL TOTAL CA: CPT

## 2023-03-19 PROCEDURE — 84100 ASSAY OF PHOSPHORUS: CPT

## 2023-03-19 PROCEDURE — 74176 CT ABD & PELVIS W/O CONTRAST: CPT

## 2023-03-19 PROCEDURE — 85025 COMPLETE CBC W/AUTO DIFF WBC: CPT

## 2023-03-19 PROCEDURE — 2580000003 HC RX 258: Performed by: INTERNAL MEDICINE

## 2023-03-19 PROCEDURE — 6370000000 HC RX 637 (ALT 250 FOR IP): Performed by: INTERNAL MEDICINE

## 2023-03-19 PROCEDURE — 6360000004 HC RX CONTRAST MEDICATION: Performed by: INTERNAL MEDICINE

## 2023-03-19 PROCEDURE — 70498 CT ANGIOGRAPHY NECK: CPT

## 2023-03-19 PROCEDURE — 84145 PROCALCITONIN (PCT): CPT

## 2023-03-19 PROCEDURE — 1200000000 HC SEMI PRIVATE

## 2023-03-19 PROCEDURE — 70450 CT HEAD/BRAIN W/O DYE: CPT

## 2023-03-19 PROCEDURE — 82962 GLUCOSE BLOOD TEST: CPT

## 2023-03-19 PROCEDURE — 83735 ASSAY OF MAGNESIUM: CPT

## 2023-03-19 PROCEDURE — 87040 BLOOD CULTURE FOR BACTERIA: CPT

## 2023-03-19 PROCEDURE — 36415 COLL VENOUS BLD VENIPUNCTURE: CPT

## 2023-03-19 PROCEDURE — 94761 N-INVAS EAR/PLS OXIMETRY MLT: CPT

## 2023-03-19 PROCEDURE — 71045 X-RAY EXAM CHEST 1 VIEW: CPT

## 2023-03-19 PROCEDURE — 81003 URINALYSIS AUTO W/O SCOPE: CPT

## 2023-03-19 RX ORDER — MAGNESIUM SULFATE IN WATER 40 MG/ML
2000 INJECTION, SOLUTION INTRAVENOUS ONCE
Status: COMPLETED | OUTPATIENT
Start: 2023-03-19 | End: 2023-03-19

## 2023-03-19 RX ADMIN — SODIUM CHLORIDE, POTASSIUM CHLORIDE, SODIUM LACTATE AND CALCIUM CHLORIDE: 600; 310; 30; 20 INJECTION, SOLUTION INTRAVENOUS at 05:36

## 2023-03-19 RX ADMIN — MAGNESIUM SULFATE HEPTAHYDRATE 2000 MG: 2 INJECTION, SOLUTION INTRAVENOUS at 18:35

## 2023-03-19 RX ADMIN — Medication 500 MG: at 21:32

## 2023-03-19 RX ADMIN — IOPAMIDOL 75 ML: 755 INJECTION, SOLUTION INTRAVENOUS at 14:21

## 2023-03-19 RX ADMIN — AMPICILLIN SODIUM 500 MG: 500 INJECTION, POWDER, FOR SOLUTION INTRAMUSCULAR; INTRAVENOUS at 05:34

## 2023-03-19 RX ADMIN — Medication 500 MG: at 17:34

## 2023-03-19 RX ADMIN — VENLAFAXINE HYDROCHLORIDE 75 MG: 37.5 CAPSULE, EXTENDED RELEASE ORAL at 10:10

## 2023-03-19 RX ADMIN — PANTOPRAZOLE SODIUM 20 MG: 20 TABLET, DELAYED RELEASE ORAL at 05:29

## 2023-03-19 RX ADMIN — LEVOTHYROXINE SODIUM 100 MCG: 0.1 TABLET ORAL at 05:29

## 2023-03-19 RX ADMIN — AMPICILLIN SODIUM 500 MG: 500 INJECTION, POWDER, FOR SOLUTION INTRAMUSCULAR; INTRAVENOUS at 17:34

## 2023-03-19 RX ADMIN — ENOXAPARIN SODIUM 40 MG: 100 INJECTION SUBCUTANEOUS at 10:11

## 2023-03-19 RX ADMIN — OXYBUTYNIN CHLORIDE 10 MG: 10 TABLET, EXTENDED RELEASE ORAL at 10:11

## 2023-03-19 RX ADMIN — FUROSEMIDE 40 MG: 40 TABLET ORAL at 10:11

## 2023-03-19 RX ADMIN — AMPICILLIN SODIUM 500 MG: 500 INJECTION, POWDER, FOR SOLUTION INTRAMUSCULAR; INTRAVENOUS at 13:28

## 2023-03-19 RX ADMIN — MEMANTINE 5 MG: 5 TABLET ORAL at 21:27

## 2023-03-19 RX ADMIN — ATORVASTATIN CALCIUM 40 MG: 40 TABLET, FILM COATED ORAL at 21:27

## 2023-03-19 RX ADMIN — FAMOTIDINE 20 MG: 20 TABLET ORAL at 21:27

## 2023-03-19 RX ADMIN — ASPIRIN 81 MG: 81 TABLET, COATED ORAL at 10:11

## 2023-03-19 RX ADMIN — ALLOPURINOL 100 MG: 100 TABLET ORAL at 10:10

## 2023-03-19 RX ADMIN — POTASSIUM CHLORIDE 20 MEQ: 20 TABLET, EXTENDED RELEASE ORAL at 10:10

## 2023-03-19 RX ADMIN — MONTELUKAST 10 MG: 10 TABLET, FILM COATED ORAL at 21:27

## 2023-03-19 RX ADMIN — MEMANTINE 5 MG: 5 TABLET ORAL at 10:10

## 2023-03-19 NOTE — PLAN OF CARE
Problem: Discharge Planning  Goal: Discharge to home or other facility with appropriate resources  3/18/2023 2109 by Pooja Andres RN  Outcome: Progressing  3/18/2023 1243 by Sofía Choi RN  Outcome: Progressing     Problem: Skin/Tissue Integrity  Goal: Absence of new skin breakdown  Description: 1. Monitor for areas of redness and/or skin breakdown  2. Assess vascular access sites hourly  3. Every 4-6 hours minimum:  Change oxygen saturation probe site  4. Every 4-6 hours:  If on nasal continuous positive airway pressure, respiratory therapy assess nares and determine need for appliance change or resting period.   3/18/2023 2109 by Pooja Andres RN  Outcome: Progressing  3/18/2023 1243 by Sofía Choi RN  Outcome: Progressing     Problem: Safety - Adult  Goal: Free from fall injury  3/18/2023 2109 by Pooja Andres RN  Outcome: Progressing  3/18/2023 1243 by Sofía Choi RN  Outcome: Progressing     Problem: ABCDS Injury Assessment  Goal: Absence of physical injury  3/18/2023 2109 by Pooja Andres RN  Outcome: Progressing  3/18/2023 1243 by Sofía Choi RN  Outcome: Progressing     Problem: Pain  Goal: Verbalizes/displays adequate comfort level or baseline comfort level  3/18/2023 2109 by Pooja Andres RN  Outcome: Progressing  3/18/2023 1243 by Sofía Choi RN  Outcome: Progressing     Problem: Chronic Conditions and Co-morbidities  Goal: Patient's chronic conditions and co-morbidity symptoms are monitored and maintained or improved  3/18/2023 2109 by Pooja Andres RN  Outcome: Progressing  3/18/2023 1243 by Sofía Choi RN  Outcome: Progressing

## 2023-03-19 NOTE — SIGNIFICANT EVENT
questions    ASSESSMENT/PLAN:    Nurse instructed to do neurochecks q4h    Patient is critical with high probability of clinical deterioration. 65 minutes of critical care time spent. This time includes time spent at bedside interviewing and examining patient, coordinating care, review of records, discussing with patient and  family at bedside. Due to the immediate potential for life-threatening deterioration due to aphasia , I spent 65 minutes providing critical care. This time is excluding time spent performing procedures.     Electronically signed by Stephany Castillo MD on 3/19/2023 at 4:05 PM

## 2023-03-20 LAB
ANION GAP SERPL CALCULATED.3IONS-SCNC: 11 MMOL/L (ref 4–16)
BUN SERPL-MCNC: 8 MG/DL (ref 6–23)
CALCIUM SERPL-MCNC: 8.6 MG/DL (ref 8.3–10.6)
CHLORIDE BLD-SCNC: 103 MMOL/L (ref 99–110)
CO2: 28 MMOL/L (ref 21–32)
CREAT SERPL-MCNC: 0.6 MG/DL (ref 0.6–1.1)
GFR SERPL CREATININE-BSD FRML MDRD: >60 ML/MIN/1.73M2
GLUCOSE SERPL-MCNC: 94 MG/DL (ref 70–99)
MAGNESIUM: 2.3 MG/DL (ref 1.8–2.4)
PHOSPHORUS: 3.9 MG/DL (ref 2.5–4.9)
POTASSIUM SERPL-SCNC: 3.9 MMOL/L (ref 3.5–5.1)
SODIUM BLD-SCNC: 142 MMOL/L (ref 135–145)

## 2023-03-20 PROCEDURE — 97116 GAIT TRAINING THERAPY: CPT

## 2023-03-20 PROCEDURE — 6360000002 HC RX W HCPCS: Performed by: INTERNAL MEDICINE

## 2023-03-20 PROCEDURE — 97535 SELF CARE MNGMENT TRAINING: CPT

## 2023-03-20 PROCEDURE — 2580000003 HC RX 258: Performed by: INTERNAL MEDICINE

## 2023-03-20 PROCEDURE — 94761 N-INVAS EAR/PLS OXIMETRY MLT: CPT

## 2023-03-20 PROCEDURE — 6370000000 HC RX 637 (ALT 250 FOR IP): Performed by: INTERNAL MEDICINE

## 2023-03-20 PROCEDURE — 83735 ASSAY OF MAGNESIUM: CPT

## 2023-03-20 PROCEDURE — 1200000000 HC SEMI PRIVATE

## 2023-03-20 PROCEDURE — 80048 BASIC METABOLIC PNL TOTAL CA: CPT

## 2023-03-20 PROCEDURE — 84100 ASSAY OF PHOSPHORUS: CPT

## 2023-03-20 PROCEDURE — 36415 COLL VENOUS BLD VENIPUNCTURE: CPT

## 2023-03-20 RX ADMIN — FAMOTIDINE 20 MG: 20 TABLET ORAL at 21:48

## 2023-03-20 RX ADMIN — POTASSIUM CHLORIDE 20 MEQ: 20 TABLET, EXTENDED RELEASE ORAL at 10:35

## 2023-03-20 RX ADMIN — PANTOPRAZOLE SODIUM 20 MG: 20 TABLET, DELAYED RELEASE ORAL at 05:56

## 2023-03-20 RX ADMIN — ALLOPURINOL 100 MG: 100 TABLET ORAL at 10:35

## 2023-03-20 RX ADMIN — AMPICILLIN SODIUM 500 MG: 500 INJECTION, POWDER, FOR SOLUTION INTRAMUSCULAR; INTRAVENOUS at 00:27

## 2023-03-20 RX ADMIN — MEMANTINE 5 MG: 5 TABLET ORAL at 10:35

## 2023-03-20 RX ADMIN — ENOXAPARIN SODIUM 40 MG: 100 INJECTION SUBCUTANEOUS at 10:35

## 2023-03-20 RX ADMIN — LEVOTHYROXINE SODIUM 100 MCG: 0.1 TABLET ORAL at 05:56

## 2023-03-20 RX ADMIN — ASPIRIN 81 MG: 81 TABLET, COATED ORAL at 10:35

## 2023-03-20 RX ADMIN — FUROSEMIDE 40 MG: 40 TABLET ORAL at 10:35

## 2023-03-20 RX ADMIN — ATORVASTATIN CALCIUM 40 MG: 40 TABLET, FILM COATED ORAL at 21:48

## 2023-03-20 RX ADMIN — SODIUM CHLORIDE, POTASSIUM CHLORIDE, SODIUM LACTATE AND CALCIUM CHLORIDE: 600; 310; 30; 20 INJECTION, SOLUTION INTRAVENOUS at 01:37

## 2023-03-20 RX ADMIN — VENLAFAXINE HYDROCHLORIDE 75 MG: 37.5 CAPSULE, EXTENDED RELEASE ORAL at 10:35

## 2023-03-20 RX ADMIN — AMPICILLIN SODIUM 500 MG: 500 INJECTION, POWDER, FOR SOLUTION INTRAMUSCULAR; INTRAVENOUS at 05:56

## 2023-03-20 RX ADMIN — OXYBUTYNIN CHLORIDE 10 MG: 10 TABLET, EXTENDED RELEASE ORAL at 10:35

## 2023-03-20 RX ADMIN — MONTELUKAST 10 MG: 10 TABLET, FILM COATED ORAL at 21:48

## 2023-03-20 RX ADMIN — VANCOMYCIN HYDROCHLORIDE 1000 MG: 1 INJECTION, POWDER, LYOPHILIZED, FOR SOLUTION INTRAVENOUS at 13:30

## 2023-03-20 RX ADMIN — MEMANTINE 5 MG: 5 TABLET ORAL at 21:48

## 2023-03-20 ASSESSMENT — PAIN SCALES - GENERAL: PAINLEVEL_OUTOF10: 0

## 2023-03-21 VITALS
WEIGHT: 144.1 LBS | SYSTOLIC BLOOD PRESSURE: 123 MMHG | BODY MASS INDEX: 28.29 KG/M2 | TEMPERATURE: 97.3 F | RESPIRATION RATE: 21 BRPM | HEART RATE: 60 BPM | OXYGEN SATURATION: 95 % | HEIGHT: 60 IN | DIASTOLIC BLOOD PRESSURE: 50 MMHG

## 2023-03-21 LAB
ANION GAP SERPL CALCULATED.3IONS-SCNC: 11 MMOL/L (ref 4–16)
BUN SERPL-MCNC: 10 MG/DL (ref 6–23)
CALCIUM SERPL-MCNC: 8.8 MG/DL (ref 8.3–10.6)
CHLORIDE BLD-SCNC: 102 MMOL/L (ref 99–110)
CO2: 29 MMOL/L (ref 21–32)
CREAT SERPL-MCNC: 0.8 MG/DL (ref 0.6–1.1)
GFR SERPL CREATININE-BSD FRML MDRD: >60 ML/MIN/1.73M2
GLUCOSE SERPL-MCNC: 107 MG/DL (ref 70–99)
MAGNESIUM: 2 MG/DL (ref 1.8–2.4)
PHOSPHORUS: 3.9 MG/DL (ref 2.5–4.9)
POTASSIUM SERPL-SCNC: 3.8 MMOL/L (ref 3.5–5.1)
SARS-COV-2 RDRP RESP QL NAA+PROBE: NOT DETECTED
SODIUM BLD-SCNC: 142 MMOL/L (ref 135–145)
SOURCE: NORMAL

## 2023-03-21 PROCEDURE — 83735 ASSAY OF MAGNESIUM: CPT

## 2023-03-21 PROCEDURE — 80048 BASIC METABOLIC PNL TOTAL CA: CPT

## 2023-03-21 PROCEDURE — 84100 ASSAY OF PHOSPHORUS: CPT

## 2023-03-21 PROCEDURE — 6370000000 HC RX 637 (ALT 250 FOR IP): Performed by: INTERNAL MEDICINE

## 2023-03-21 PROCEDURE — 6360000002 HC RX W HCPCS: Performed by: INTERNAL MEDICINE

## 2023-03-21 PROCEDURE — 87635 SARS-COV-2 COVID-19 AMP PRB: CPT

## 2023-03-21 PROCEDURE — 36415 COLL VENOUS BLD VENIPUNCTURE: CPT

## 2023-03-21 PROCEDURE — 94761 N-INVAS EAR/PLS OXIMETRY MLT: CPT

## 2023-03-21 PROCEDURE — 2580000003 HC RX 258: Performed by: INTERNAL MEDICINE

## 2023-03-21 RX ORDER — ATORVASTATIN CALCIUM 40 MG/1
40 TABLET, FILM COATED ORAL EVERY EVENING
Qty: 30 TABLET | Refills: 0
Start: 2023-03-21

## 2023-03-21 RX ADMIN — ALLOPURINOL 100 MG: 100 TABLET ORAL at 09:25

## 2023-03-21 RX ADMIN — FUROSEMIDE 40 MG: 40 TABLET ORAL at 09:25

## 2023-03-21 RX ADMIN — MEMANTINE 5 MG: 5 TABLET ORAL at 09:26

## 2023-03-21 RX ADMIN — POTASSIUM CHLORIDE 20 MEQ: 20 TABLET, EXTENDED RELEASE ORAL at 09:26

## 2023-03-21 RX ADMIN — OXYBUTYNIN CHLORIDE 10 MG: 10 TABLET, EXTENDED RELEASE ORAL at 09:25

## 2023-03-21 RX ADMIN — PANTOPRAZOLE SODIUM 20 MG: 20 TABLET, DELAYED RELEASE ORAL at 06:09

## 2023-03-21 RX ADMIN — VENLAFAXINE HYDROCHLORIDE 75 MG: 37.5 CAPSULE, EXTENDED RELEASE ORAL at 09:26

## 2023-03-21 RX ADMIN — LEVOTHYROXINE SODIUM 100 MCG: 0.1 TABLET ORAL at 06:09

## 2023-03-21 RX ADMIN — ENOXAPARIN SODIUM 40 MG: 100 INJECTION SUBCUTANEOUS at 09:24

## 2023-03-21 RX ADMIN — ASPIRIN 81 MG: 81 TABLET, COATED ORAL at 09:25

## 2023-03-21 RX ADMIN — VANCOMYCIN HYDROCHLORIDE 1000 MG: 1 INJECTION, POWDER, LYOPHILIZED, FOR SOLUTION INTRAVENOUS at 14:33

## 2023-03-21 NOTE — CARE COORDINATION
KALIEW received a call form Jael Cardozo with [facility] and they have approval. LSW PS the doctor and informed her of approval. PASS completed. Pt will need rapid COVID test on day of discharge. CM will need SYLVIA completed by RN and doctor. If pt is discharged after hours please complete the following. ... Call report to [phone]  Fax completed AVS with both SYLVIA on the AVS and any written Rx [phone]. Set up transportation [facility] and call family.
KALIEW received a call from [place] with [address] and they can take pt once they have precert. [facility] stated they will start the precert process.
LSW spoke with pt son and dght regarding the decision for SNF. They requested [facility]. LSW called [facility] and gave referral to Lois. He will nelly over pt info and get back with this LSW.
LSW spoke with pt's dght regarding their decision for SNF placement. Dght stated that she and the family is still investigating SNF and will let this LSW know their 3 choices some time today. Pt has [employer] and will need a precert. PT/OT notes are up to date as of today. Will need updated notes tomorrow.
Precert is pending for [facility]. Need updated PT/OT notes. WB note placed.  PASS completed and packet started.     LSW was informed in IDR that pt dght [facility] is asking if pt can go to ARU.  LSW read PT/OT evals again and the recs are for SNF.  LSW called [facility] and explained that ARU is not being recommended therefore the insurance will not approve.  Dght stated understanding.    
Pt has a discharge order. [place] to  pt at 4:00pm. LSW will fax AVS  once RN completes her SYLVIA. Pt, bayron , RN and Cyndee Collins at [facility] aware of  time. Pt needs rapid COVID RN is aware.      LSW faxed AVS with both SYLVIA to [address]
Issues/Barriers to RETURNING to current housing: SNF placement  Potential Assistance needed at discharge: Other (Comment) (dgtr interested in discussing nursing home or other living arrangements)            Potential DME:  tbd  Patient expects to discharge to: Unknown  Plan for transportation at discharge:  tbd    Financial    Payor: Renae Paulino / Plan: Ismael Calles / Product Type: *No Product type* /     Does insurance require precert for SNF: Yes    Potential assistance Purchasing Medications:    Meds-to-Beds request: No      [facility] [address] [facility] [phone]  [address] [address]  Phone: [phone] Fax: [phone]      Notes:    Factors facilitating achievement of predicted outcomes: Family support, Motivated, Cooperative, and Pleasant    Barriers to discharge: SNF placement. Additional Case Management Notes: CM in to see Pt to initiate discharge planning. Pt from home with her daughter Joie Sharma. Pt and Joie Sharma in agreement with therapy recommendation of SNF. List of in-network facilities provided. Joie Sharma states she will contact this CM with SNF choice by tomorrow morning. Pt will require a precert.   CM following     The Plan for Transition of Care is related to the following treatment goals of Stroke-like symptoms [R29.90]    The Patient and/or Patient Representative Agree with the Discharge Plan?  yes    Michelle Gruber [facility]  Case Management Department  Ph: [phone]

## 2023-03-21 NOTE — DISCHARGE SUMMARY
not talking or following commands Has a \"code stroke\" or \"stroke alert\" been called? ->Yes Reason for Exam: AMS not talking or following commands FINDINGS: CTA NECK: AORTIC ARCH/ARCH VESSELS: No dissection or arterial injury. No significant stenosis of the brachiocephalic or subclavian arteries. CAROTID ARTERIES: No dissection, arterial injury, or hemodynamically significant stenosis by NASCET criteria. 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 There is 50% stenosis at the origin of the left vertebral artery. No acute abnormality or flow-limiting stenosis in the remainder of the bilateral vertebral arteries. SOFT TISSUES: There is minimal dependent atelectasis at the posterior right lung apex. No cervical or superior mediastinal lymphadenopathy. The larynx and pharynx are unremarkable. No acute abnormality of the salivary glands. 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 BONES: No acute osseous abnormality. There are moderate to severe degenerative changes in the cervical spine. CTA HEAD: ANTERIOR CIRCULATION: There is 30% stenosis in the supraclinoid segment of the right internal carotid artery. No significant stenosis of the intracranial internal carotid, anterior cerebral, or middle cerebral arteries. No aneurysm. POSTERIOR CIRCULATION: No significant stenosis of the vertebral, basilar, or posterior cerebral arteries. No aneurysm. OTHER: No dural venous sinus thrombosis on this non-dedicated study. BRAIN: No mass effect or midline shift. No extra-axial fluid collection. The gray-white differentiation is maintained. 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 50% stenosis at the origin of the left vertebral artery. 30% stenosis in the supraclinoid segment of the right internal carotid artery.  Otherwise, no acute abnormality or flow limiting stenosis of the remainder of the major arteries of the head and neck     CTA HEAD NECK

## 2023-03-21 NOTE — PROGRESS NOTES
03/15/23 1719   Encounter Summary   Encounter Overview/Reason   Encounter; Initial Encounter   Service Provided For: Patient and family together   Referral/Consult From: [facility] [place]   Last Encounter  03/15/23  (Jorge L [place]: Patient fell and broke her hip. Patient said she is feeling better. I administered the Sacrament of the Sick and gave Holy Communion at her request. Offered emotional support to daughter also.)   Complexity of Encounter Moderate   Begin Time 1640   End Time  1715   Total Time Calculated 35 min   Encounter    Type Initial Screen/Assessment   Spiritual/Emotional needs   Type Spiritual Support   Rituals, Rites and Sacraments   Type Sacrament of Sick;Pentecostalism Communion   Assessment/Intervention/Outcome   Assessment Calm; Hopeful   Intervention Active listening;Discussed illness injury and its impact;Nurtured Hope;Prayer (assurance of)/Stewart   Outcome Comfort;Encouraged;Engaged in conversation;Expressed Gratitude   Plan and Referrals   Plan/Referrals Continue to visit, (comment)
[facility], 8/3/1929, 3030/3030-A, 3/14/2023    Discharge Recommendation: SNF    History:  [place]:  There were no encounter diagnoses. Patient  has a past medical history of ACP (advance care planning), Acute cystitis without hematuria, Allergic rhinitis, Anxiety, Arthritis, Bradycardia, Cellulitis, leg, Dementia ([address] Depression, Diabetes mellitus ([address] Diabetic eye exam ([address] Dysuria, Flu vaccine need, Gout, Hyperlipidemia, Hypertension, Hypothyroidism, and Positive depression screening. Patient  has a past surgical history that includes Thyroidectomy, partial; Carpal tunnel release; Ulnar tunnel release; hernia repair; Appendectomy (13 yrs ago); Hysterectomy; Colonoscopy; joint replacement; eye surgery; Endoscopy, colon, diagnostic (7/8/13); and hip surgery (Left, 08/22/2016). Subjective:  Patient states: pt agreeable, with minimal communication this date  Pain: denies  Communication with other providers: coeval with PT Noble Sommers  Restrictions: general precautions, fall risk, cog/safety, contact precautions  Pt daughter at bedside- kind and helpful, states that pt only got one hour of sleep last night    Home Setup/Prior level of function:  Social/Functional History  Lives With: Alone  Type of Home: Apartment  Home Layout: One level  Home Equipment: Rollator  ADL Assistance: Independent  Homemaking Assistance: Needs assistance (prepares simple meals and basic cleaning tasks, daughter assists with groceries, cleaning, and meal prep)  Ambulation Assistance: Independent (pt typically mod I with rollator)  Transfer Assistance: Independent  Active : No  Patient's  Info: daughter provides transportation  Occupation: Retired  Leisure & Hobbies: typically very active per daughter  Additional Comments: pt limited historian today, PLOF/history gathered from daughter at bedside.  Daughter states she lives down the hooks from pt in
4 Eyes Skin Assessment     NAME:  Andres Davis  YOB: 1929  MEDICAL RECORD NUMBER:  [id number]    The patient is being assessed for  Admission    I agree that One RN has performed a thorough Head to Toe Skin Assessment on the patient. ALL assessment sites listed below have been assessed. Areas assessed by both nurses:    Head, Face, Ears, Shoulders, Back, Chest, Arms, Elbows, Hands, Sacrum. Buttock, Coccyx, Ischium, and Legs. Feet and Heels        Does the Patient have a Wound? Yes wound(s) were present on assessment.  LDA wound assessment was Initiated and completed by RN       Sergio Prevention initiated by RN: Yes   Wound Care Orders initiated by RN: No    Pressure Injury (Stage 3,4, Unstageable, DTI, NWPT, and Complex wounds) if present, place referral order by RN under : No    New and Established Ostomies, if present place, referral order under : No      Nurse 1 eSignature: Electronically signed by Ramses Chopra RN on 3/13/23 at 1:06 AM EDT    **SHARE this note so that the co-signing nurse can place an eSignature**    Nurse 2 eSignature: Electronically signed by Nathaly Benson RN on 3/13/23 at 1:09 AM EDT
[facility]  consulted by Dr. Christine Garcia for monitoring and adjustment. Indication for treatment: Vancomycin indication: Bacteremia  Goal trough: Trough Goal: 10-15 mcg/mL  AUC/HALEY: 400-600    Pertinent Laboratory Values:   Temp Readings from Last 3 Encounters:   03/20/23 97 °F (36.1 °C) (Oral)   03/12/23 97.4 °F (36.3 °C) (Temporal)   11/15/22 97.9 °F (36.6 °C)     Recent Labs     03/19/23  1425   WBC 4.4     Recent Labs     03/19/23  0312 03/19/23  1425 03/20/23  0314   BUN 10 8 8   CREATININE 0.7 0.6 0.6     Estimated Creatinine Clearance: 49 mL/min (based on SCr of 0.6 mg/dL). No intake or output data in the 24 hours ending 03/20/23 1215    Pertinent Cultures:   Date    Source    Results  3/12   Blood    Staph epi  3/19   Blood    Pending    Vancomycin level:   TROUGH:  No results for input(s): VANCOTROUGH in the last 72 hours. RANDOM:  No results for input(s): VANCORANDOM in the last 72 hours. Assessment:  HPI: Flakita Martinez is a 80 y.o. female with PMH of gout, hypothyroidism, depression, GERD, cognitive impairment who presented with stroke-like symptoms. Patient was found to have stroke-like symptoms in setting of electrolyte abnormalities and UTI which have resolved with antibiotic treatment. Blood cultures collected on 3/12  were 4/4 positive for staph epi. Repeat cultures are ordered and collected.   SCr, BUN, and urine output: SCr stable in range 0.6-0.7  Day(s) of therapy: 1  Vancomycin concentration: to be collected    Plan:  Vancomycin 1000 mg IVPB q24h  Plan to collect a level in 48h  Pharmacy will continue to monitor patient and adjust therapy as indicated    [place] 3 3/22 @ 0600    Thank you for the consult,  Yasmine Wolff, [facility], PharmD  3/20/2023 12:15 PM
Neurology Service Consult Note  [address]   Patient Name: Sabi Green  : 8/3/1929        Subjective:   CC: Aphasia, LLE weakness  Chart was reviewed in detail. Patient was seen and assessed. She is working with therapy. Appears deconditioned. A bit more confused and drowsy today. Discussed MRI results with the patients daughter, she understands there is no evidence for stroke. She continues to debate moving patient into a facility for closer monitoring.        Past Medical History:   Diagnosis Date    ACP (advance care planning) 8/10/2020    Acute cystitis without hematuria 2021    Allergic rhinitis     Anxiety     Arthritis     Bradycardia     Cellulitis, leg 2012    Dementia ([address]     Depression     Diabetes mellitus ([address]     sugars controlled off meds    Diabetic eye exam ([facility] 16    no retinopathy    Dysuria 2020    Flu vaccine need 2020    Gout     Hyperlipidemia     Hypertension     Hypothyroidism     Positive depression screening 2021    :   Past Surgical History:   Procedure Laterality Date    APPENDECTOMY  13 yrs ago    CARPAL TUNNEL RELEASE      bilateral    COLONOSCOPY      ENDOSCOPY, COLON, DIAGNOSTIC  13    gastritis, hiatal hernia    EYE SURGERY      both eyes    HERNIA REPAIR      umbilical    HIP SURGERY Left 2016    ORIF    HYSTERECTOMY (CERVIX STATUS UNKNOWN)      complete    JOINT REPLACEMENT      left knee    THYROIDECTOMY, PARTIAL      ULNAR TUNNEL RELEASE      bilateral     Medications:  Scheduled Meds:   enoxaparin  40 mg SubCUTAneous Daily    aspirin  81 mg Oral Daily    Or    aspirin  300 mg Rectal Daily    atorvastatin  40 mg Oral QPM    allopurinol  100 mg Oral Daily    famotidine  40 mg Oral QPM    furosemide  40 mg Oral Daily    levothyroxine  100 mcg Oral Daily    memantine  5 mg Oral BID    montelukast  10 mg Oral QPM    pantoprazole  20 mg Oral QAM AC    oxybutynin  10 mg Oral Daily    potassium chloride  20 mEq
Nutrition Education    Noted per chart review pt with hx CHF. However, at this time d/t advanced age of 79 yo and plan for discharge to SNF will defer extensive diet education at this time. Will monitor and follow clinical course for change in condition or need for further interventions.      Elisa Lacey RD  Contact Number: [phone]
Occupational Therapy Treatment Note  Name: Gertrude Og MRN: [id number] :   8/3/1929   Date:  3/17/2023   Admission Date: 3/13/2023 Room:  [address] [place] [address]-A     Primary Problem:    [place]:  There were no encounter diagnoses. Patient  has a past medical history of ACP (advance care planning), Acute cystitis without hematuria, Allergic rhinitis, Anxiety, Arthritis, Bradycardia, Cellulitis, leg, Dementia ([facility] Depression, Diabetes mellitus ([facility] Diabetic eye exam ([facility] Dysuria, Flu vaccine need, Gout, Hyperlipidemia, Hypertension, Hypothyroidism, and Positive depression screening. Patient  has a past surgical history that includes Thyroidectomy, partial; Carpal tunnel release; Ulnar tunnel release; hernia repair; Appendectomy (13 yrs ago); Hysterectomy; Colonoscopy; joint replacement; eye surgery; Endoscopy, colon, diagnostic (13); and hip surgery (Left, 2016). Communication with other providers:  assisted by nursing aide     Subjective:  Patient states: \"Oh you think we're moving? Oh okay. \"  Pain: denies  Restrictions: general precautions, fall risk, contact, cog/safety    Objective:    Observation:  pt was semi fowlers in bed resting upon arrival, agreeable to session. Tele, PIV, VIOLET  Objective Measures:  stable    Treatment, including education:    Therapeutic Activity Training (15 minutes): Therapeutic activity training was instructed today. Cues were given for safety, sequence, UE/LE placement, visual cues, and balance. Activities performed today included     Supine to sit- min A    Seated balance- CGA progressing to SBA    STS- CGA with inc time    Amb ~5 ft to chair- CGA using RW with slow pace. X1 LOB with walker tipping off of floor, mod A to correct.   Stand to sit- CGA with cues for hand placement/controlled sit    Pt positioned for comfort ready for sponge bath with aide    Education: Role of OT, OT POC, safety, benefits of EOB/OOB activity, rationale for treatment  Safety
Patient moved closer to the nurse's station to 3007 due to agitation and attempting to get out of bed. Patient calm upon arrival to room. Will continue to monitor closely.
Physical Therapy    Physical Therapy Treatment Note  Name: Moise Wasserman MRN: [id number] :   8/3/1929   Date:  3/20/2023   Admission Date: 3/13/2023 Room:  [address] [place] [address]   Restrictions/Precautions:           Contact precaution, fall risk, tele, IV, h/o dementia, VIOLET, general   Communication with other providers:  co-tx with OT, [facility] for CM note and pt endurance. Subjective:  Patient states:  \"I can walk the whole hooks\"  Pain:   Location, Type, Intensity (0/10 to 10/10):  pt without c/o  Objective:    Observation:  pt is awake up in recliner with family present upon entry  Objective Measures:  HR stable in 80's, Sp02 stable on RA, BP in standing 140/68 BPM  Treatment, including education/measures:  STS: Scooting to front of chair required in preparation for STS, SBA with min increased time. STS trial 1 failed with CGA x1 d/t pt inadequate anterior WS. Trial 2 with PT cues for BUE pushoff from arm rests and Min A to upright. Standing balance: at recliner x1' for clothing management and preparation for AMB  Gait: Pt AMB x8 ft to BR with RW and CGA, min increased time to complete, min cues for RW management in BR.  RTS at commode with cues for grab bar use and CGA. Seated balance with SUP nearby for safety ~4 min. OT assisted pt in threading/donning new depends in seated. Sit>stand from commode>RW with cues and CGA, increased time to upright with mild increased retro lean in standing. Min A in standing at commode to correct retro lean. AMB x3 ft to sink with RW and cues, increased time. Standing unsupported balance in hygiene tasks with SBA ~3 min. Gait: In hallway pt AMB x30+50+60 ft with  slowed tyson, increased difficulty initiating RLE swing phase, min decreased R foot clearance. Standing breaks required intermittently d/t fatigue, overall increased time d/t slow pace. X1 instance of retro LOB with turning in gait with Min A to correct.    End of session RTS at recliner with cues,
Physician Progress Note      PATIENT:               Corey Davies  CSN #:                  [id number]  :                       8/3/1929  ADMIT DATE:       3/13/2023 12:16 AM  DISCH DATE:  RESPONDING  PROVIDER #:        Wynn Sicard MD          QUERY TEXT:    Internal Medicine,    Pt admitted with AMS and stroke-like symptoms. Noted documentation of CVA by   the Neurology consultant. If possible, please document in progress notes and   discharge summary:    The medical record reflects the following:  Risk Factors: nonspecific  Clinical Indicators: AMS, aphasia, left lower extremity weakness: Per   Neurology documentation: \"Aphasia, left lower extremity weakness, confusion   secondary to acute stroke v metabolic encephalopathy given UTI, hypokalemia. \"   MRI neg for acute infarct  Treatment: labs, imaging, Neuro consult, observation, Neuro checks    Thank you,  Jeff Vickers RN CDS  [phone]  Options provided:  -- CVA confirmed present on admission  -- CVA confirmed not present on admission  -- CVA ruled out  -- CVA ruled out, please specify suspected cause. -- Other - I will add my own diagnosis  -- Disagree - Not applicable / Not valid  -- Disagree - Clinically unable to determine / Unknown  -- Refer to Clinical Documentation Reviewer    PROVIDER RESPONSE TEXT:    The diagnosis of CVA was ruled out, patient has stroke like symptoms due to   UTI. Query created by: Chava Starks on 3/15/2023 10:24 AM      QUERY TEXT:    Hospitalists,    Pt admitted with stroke like symptoms. , noted to have documentation of AMS,   confusion and agitation.  If possible, please document in the progress notes   and discharge summary if you are evaluating and / or treating any of the   following:]    The medical record reflects the following:  Risk Factors: UTI  Clinical Indicators: acidosis, documentation of AMS, confusion, per nursing   documentation, confused, agitated and attempting to climb out of bed  Treatment:
RENAL DOSE ADJUSTMENT MADE PER P/T PROTOCOL    PREVIOUS ORDER:  Famotidine 40mg PO BID    Estimated Creatinine Clearance: 35 mL/min (based on SCr of 0.8 mg/dL).   Recent Labs     03/12/23  1740 03/13/23  0652 03/14/23  1029   BUN 26* 21 12   CREATININE 0.9 0.8 0.8    159  --      NEW RENALLY ADJUSTED ORDER:  Famotidine 20mg PO daily    JEANCARLOS [facility]  3/15/2023 3:50 PM
Report called to Zhou at this time. Spoke to BALJINDER Johnson. All questions answered.
V2.0  [facility] Hospitalist Progress Note      Name:  Sabi Green /Age/Sex: 8/3/1929  (80 y.o. female)   MRN & CSN:  [id number] & [id number] Encounter Date/Time: 3/20/2023 9:09 AM EDT    Location:  5365/4454-K PCP: Shelia Jensen, [facility] Day: 8    Assessment and Plan:   Sabi Green is a 80 y.o. female with PMH of gout, hypothyroidism, depression, GERD, cognitive impairment who presents with Stroke-like symptoms    Medically stable for discharge pending placement for [employer]    Strokelike symptoms: aphasia, LLE weakness-resolved  Metabolic encephalopathy in the setting of electrolyte abnormalities and UTI-resolved  -Last well-known-unknown, Stroke Alert was called at [place] ED   -Patient had aphasia, left lower extremity weakness in ED. Aphasia has resolved.   Patient is able to communicate clearly, still has left lower extremity weakness compared to the right  -CT head, CTA head and neck-no acute process  -NIHSS/PT/OT/SLP evaluation  -MRI Head: pending (IV Ativan 0.5 prior to for claustrophobia)   -2D echo: pending   -Neurology Consulted, appreciate recs--> likely metabolic in nature, signed off    #Aphasia-resolved  -had episode of of aphasia 3/18 which resolved by the time [address] [place] [address] neurology saw her  -CT head and CTA head and neck were unremarkable for any acute abnormalities  -UA negative  -Likely in the setting of hypoactive delirium  -Labs were only significant for hypomagnesemia and hypophosphatemia  -Follow-up on blood cultures     UTI: hx of ESBL E. Coli -resolved  -UA-large leukocyte esterase, nitrate negative, WBC 35, few bacteria  -Urine CX: pending   -Urine culture in the past grew ESBL E. coli-2023  -Patient received IV ceftriaxone in ED  -initially received meropenem given h/o ESBL organism  -3/15: switched to ampicillin  -Blood cultures from 3/12 are positive for s epidermidis, will start vancomycin and await repeat cultures from yesterday      Right Hand Fracture:
V2.0  [facility] Hospitalist Progress Note      Name:  Gertrude Og /Age/Sex: 8/3/1929  (80 y.o. female)   MRN & CSN:  [id number] & [id number] Encounter Date/Time: 3/16/2023 9:09 AM EDT    Location:  On license of [facility]-V PCP: Levi [facility] Day: 4    Assessment and Plan:   Gertrude Og is a 80 y.o. female with PMH of gout, hypothyroidism, depression, GERD, cognitive impairment who presents with Stroke-like symptoms    Strokelike symptoms: aphasia, LLE weakness-resolved  Metabolic encephalopathy in the setting of electrolyte abnormalities and UTI-resolved  -Last well-known-unknown, Stroke Alert was called at [facility] ED   -Patient had aphasia, left lower extremity weakness in ED. Aphasia has resolved. Patient is able to communicate clearly, still has left lower extremity weakness compared to the right  -CT head, CTA head and neck-no acute process  -NIHSS/PT/OT/SLP evaluation  -MRI Head: pending (IV Ativan 0.5 prior to for claustrophobia)   -2D echo: pending   -Neurology Consulted, appreciate recs--> likely metabolic in nature, signed off     UTI: hx of ESBL E. Coli   -UA-large leukocyte esterase, nitrate negative, WBC 35, few bacteria  -Urine CX: pending   -Urine culture in the past grew ESBL E. coli-2023  -Patient received IV ceftriaxone in ED  -initially received meropenem given h/o ESBL organism  -3/15: switched to ampicillin     Right Hand Fracture: Proximal Rt Thumb  -seen on Xray of Hand, splinted      Hypokalemia-resolving  -supplement K     Fall  -Patient complaining of pain in her right hand, especially right thumb  -X-ray of the right hand ordered  -PT/OT evaluation. Lactic acidosis  -Continue IV fluids and repeat level. Gout-patient is on allopurinol     Hypothyroidism-continue levothyroxine     Depression-on venlafaxine, trazodone     GERD-Pepcid nightly, omeprazole daily a.m.      Bilateral lower extremity edema  -Patient is on Lasix 40 mg daily, spironolactone as
V2.0  [facility] Hospitalist Progress Note      Name:  Daniela Mims /Age/Sex: 8/3/1929  (80 y.o. female)   MRN & CSN:  [id number] & [id number] Encounter Date/Time: 3/17/2023 9:09 AM EDT    Location:  261/5462-S PCP: Rose Atkinson, [facility] Day: 5    Assessment and Plan:   Daniela Mims is a 80 y.o. female with PMH of gout, hypothyroidism, depression, GERD, cognitive impairment who presents with Stroke-like symptoms    Medically stable for discharge    Strokelike symptoms: aphasia, LLE weakness-resolved  Metabolic encephalopathy in the setting of electrolyte abnormalities and UTI-resolved  -Last well-known-unknown, Stroke Alert was called at [facility] ED   -Patient had aphasia, left lower extremity weakness in ED. Aphasia has resolved. Patient is able to communicate clearly, still has left lower extremity weakness compared to the right  -CT head, CTA head and neck-no acute process  -NIHSS/PT/OT/SLP evaluation  -MRI Head: pending (IV Ativan 0.5 prior to for claustrophobia)   -2D echo: pending   -Neurology Consulted, appreciate recs--> likely metabolic in nature, signed off     UTI: hx of ESBL E. Coli   -UA-large leukocyte esterase, nitrate negative, WBC 35, few bacteria  -Urine CX: pending   -Urine culture in the past grew ESBL E. coli-2023  -Patient received IV ceftriaxone in ED  -initially received meropenem given h/o ESBL organism  -3/15: switched to ampicillin     Right Hand Fracture: Proximal Rt Thumb  -seen on Xray of Hand, splinted      Hypokalemia-resolving  -supplement K     Fall  -Patient complaining of pain in her right hand, especially right thumb  -X-ray of the right hand ordered  -PT/OT evaluation. Lactic acidosis  -Continue IV fluids and repeat level. Gout-patient is on allopurinol     Hypothyroidism-continue levothyroxine     Depression-on venlafaxine, trazodone     GERD-Pepcid nightly, omeprazole daily a.m.      Bilateral lower extremity edema  -Patient is on Lasix
When going to give patient antibiotic patient was sleeping. Patient was woken up to let them know what medication they were getting. Patient woke up but was not responding which was very different from how they were in the morning. Patient was asked questions multiple times and continued not to answer but followed commands pupils responded to light both round. Dr. Abiola Bocanegra was notified as well as charge nurse both came into room and looked at patient. Stroke alert was called at this time.
clothing mostly but requires inc assist for pericare    LB bathing- dep    LB dressing- dep for changing socks and depends    New purewick placed end of session, bathroom floor cleaned of urine    Therapeutic Activity Training (10 minutes): Therapeutic activity training was instructed today. Cues were given for safety, sequence, UE/LE placement, visual cues, and balance. Activities performed today included     Supine to sit- min Ax2    Seated balance- SBA for ~5 mins    STS from EOB- min Ax2    Amb to bathroom and back- CGA-min A with RW with x2 LOB    Pt ambulating in room with PT, see note for details    Pt sitting in chair with CGA, positioned for comfort with needs met. Given warm blankets and setup with lunch tray. Education: Role of OT, OT POC, safety, benefits of EOB/OOB activity, rationale for treatment  Safety Measures: Gait belt used for safety of pt and therapist, Left in recliner, Alarm in place, call light and phone within reach, lines managed    Assessment / Impression:    Pt tolerating session well today, cont confusion noted with difficulty processing/sequencing tasks and commands. Much more alert and engaged today.  Will cont to follow and monitor    Patient's tolerance of treatment: good  Adverse Reaction: LOB, fatigue, incontinence, confusion  Significant change in status and impact:  more alert/engaged  Barriers to improvement: cog/safety, deconditioning, comorbidities    Plan for Next Session:    Continue OT POC    Time in:  91  Time out:  1324  Timed treatment minutes:  40  Total treatment time:  52    Electronically signed by:    Luisa Osman OT, OTR/L  3/15/2023, 3:45 PM
dressing- max A to change depends    STS from toilet- CGA    Grooming- setup/SBA standing at sink with cues    Amb to room- CGA-min A using RW, pt leaning heavily on back pegs of walker causing walker to not move. Cues for correcting posture/walker use        *Pt completing functional mobility in hallway with PT Elizabeth Johnson OT providing chair follow for safety but time will not be billed. See PT note for further details. Education: Role of OT, OT POC, safety, benefits of EOB/OOB activity, rationale for treatment  Safety Measures: Gait belt used for safety of pt and therapist, Left in recliner end of session, Alarm in place, call light and phone within reach, lines managed    Assessment / Impression:    Pt tolerating session well today, alert and pleasant, oriented to situation today. Pt tolerating further mobility in hooks with PT, seems to be progressing well.     Patient's tolerance of treatment: good  Adverse Reaction: none  Significant change in status and impact:  progressing  Barriers to improvement: cog/safety, deconditioning, comorbidities    Plan for Next Session:    Continue OT POC    Time in:  1116  Time out:  1155  Timed treatment minutes:  27  Total treatment time:  39    Electronically signed by:    Christy Cleary OT OTR/L  3/20/2023, 12:17 PM
transportation  Occupation: Retired  Leisure & Hobbies: typically very active per daughter  Additional Comments: pt limited historian today, PLOF/history gathered from daughter at bedside. Daughter states she lives down the hooks from pt in the same apartment building and that she has cameras installed in pt's home for monitoring/safety. Pt has sons residing nearby but pt's daughter is sole caregiver- dgtr is also working 2 jobs and has had inc difficulty in caring for pt with responsibility load  Short Term Goals  Time Frame for Short Term Goals: Pt will complete rolling YENI with Mod cues and Min A  Short Term Goal 1: Pt will complete STS from EOB>RW with Min A x1 and cues  Short Term Goal 2: Pt will AMB x30' with RW, chair follow, Min A  Short Term Goal 3: Pt will complete SPT EOB> chair with RW and CGA  Short Term Goal 4: Pt will tolerate x8' standing dynamic balance training with x1 UE support CGA       Electronically signed by:     Marit Skiff, DEUCE  3/17/2023, 3:02 PM
Recent Results (from the past 24 hour(s))   Basic Metabolic Panel    Collection Time: 03/14/23 10:29 AM   Result Value Ref Range    Sodium 144 135 - 145 MMOL/L    Potassium 3.2 (L) 3.5 - 5.1 MMOL/L    Chloride 104 99 - 110 mMol/L    CO2 28 21 - 32 MMOL/L    Anion Gap 12 4 - 16    BUN 12 6 - 23 MG/DL    Creatinine 0.8 0.6 - 1.1 MG/DL    Est, Glom Filt Rate >60 >60 mL/min/1.73m2    Glucose 162 (H) 70 - 99 MG/DL    Calcium 8.8 8.3 - 10.6 MG/DL   Magnesium    Collection Time: 03/14/23 10:29 AM   Result Value Ref Range    Magnesium 1.5 (L) 1.8 - 2.4 mg/dl   POCT Glucose    Collection Time: 03/14/23 11:07 PM   Result Value Ref Range    POC Glucose 175 (H) 70 - 99 MG/DL        Imaging/Diagnostics Last 24 Hours   XR HAND RIGHT (2 VIEWS)    Result Date: 3/13/2023  EXAMINATION: TWO XRAY VIEWS OF THE RIGHT HAND 3/13/2023 1:28 am COMPARISON: None. HISTORY: ORDERING SYSTEM PROVIDED HISTORY: fall, right thumb pain and swelling TECHNOLOGIST PROVIDED HISTORY: Reason for exam:->fall, right thumb pain and swelling Reason for Exam: fall, right thumb pain and swelling Additional signs and symptoms: right thumb redness and swelling, fall FINDINGS: Bones are diffusely demineralized. There are diffuse degenerative changes in the right hand and wrist severe in the right 1st carpometacarpal joint with subluxation. There is also subluxation of the right 1st metacarpal phalangeal joint. Radiocarpal and ulnar carpal joint space narrowing with chondrocalcinosis. An acute fracture of the proximal aspect of the right 1st proximal phalanx is suspected with adjacent soft tissue swelling. Acute fracture of the proximal aspect of right 1st proximal phalanx with mild subluxation of the right 1st metacarpal phalangeal joint. Multifocal arthritic changes as above.      CT HEAD WO CONTRAST    Result Date: 3/12/2023  EXAMINATION: CT OF THE HEAD WITHOUT CONTRAST  3/12/2023 6:05 pm TECHNIQUE: CT of the head was performed without the
Simple  Dentition: Edentulous  Patient Positioning: Upright in bed  Baseline Vocal Quality: Normal  Prior Dysphagia History: none charted, dtr reports pt eats regular textures at home  Consistencies Administered: Regular; Soft and Bite-Sized; Thin - straw    Vision/Hearing  Hearing  Hearing: Within functional limits    Oral Motor Deficits  Oral/Motor  Oral Hygiene: Moist;Clean    Oral Phase Dysfunction  Oral Phase  Oral Phase: WNL     Indicators of Pharyngeal Phase Dysfunction   Pharyngeal Phase   Pharyngeal Phase: WNL    Prognosis  Individuals consulted  Consulted and agree with results and recommendations: Family member;RN  Family member consulted: dtr  RN Name: [employer]    Education  Patient Education: results WNL  Patient Education Response: No evidence of learning  Safety Devices in place: Yes  Type of devices: All fall risk precautions in place; Left in bed       Therapy Time  In/out:  RAYSHAWN Maddox  3/13/2023 9:47 AM
lives down the hooks from pt in the same apartment building and that she has cameras installed in pt's home for monitoring/safety.  Pt has sons residing nearby but pt's daughter is sole caregiver- dgtr is also working 2 jobs and has had inc difficulty in caring for pt with responsibility load        Short Term Goals  Time Frame for Short Term Goals: Pt will complete rolling YENI with Mod cues and Min A  Short Term Goal 1: Pt will complete STS from EOB>RW with Mod A x1 and cues  Short Term Goal 2: Pt will AMB x10' with RW, chair follow, Min A  Short Term Goal 3: Pt will complete SPT EOB> chair with RW and Min A    Electronically signed by:    Yocasta St PT  3/15/2023, 2:02 PM
CONTRAST    Result Date: 3/12/2023  EXAMINATION: CT OF THE HEAD WITHOUT CONTRAST  3/12/2023 6:05 pm TECHNIQUE: CT of the head was performed without the administration of intravenous contrast. Automated exposure control, iterative reconstruction, and/or weight based adjustment of the mA/kV was utilized to reduce the radiation dose to as low as reasonably achievable. COMPARISON: Noncontrast CT head done December 17, 2021. HISTORY: ORDERING SYSTEM PROVIDED HISTORY: Altered mental status TECHNOLOGIST PROVIDED HISTORY: Has a \"code stroke\" or \"stroke alert\" been called? ->No Reason for exam:->Altered mental status Reason for Exam: Altered mental status Additional signs and symptoms: Altered mental status FINDINGS: BRAIN/VENTRICLES: There is no acute intracranial hemorrhage, mass effect or midline shift. No abnormal extra-axial fluid collection. The gray-white differentiation is maintained without evidence of an acute infarct. There is prominence of the ventricles and sulci due to global parenchymal volume loss. There are nonspecific areas of hypoattenuation within the periventricular and subcortical white matter, which likely represent chronic microvascular ischemic change. ORBITS: The visualized portion of the orbits demonstrate no acute abnormality. SINUSES: The visualized paranasal sinuses and mastoid air cells demonstrate no acute abnormality. SOFT TISSUES/SKULL: No acute abnormality of the visualized skull or soft tissues. No acute intracranial abnormality. XR CHEST PORTABLE    Result Date: 3/12/2023  EXAMINATION: ONE XRAY VIEW OF THE CHEST 3/12/2023 3:05 pm COMPARISON: 08/26/2021. HISTORY: ORDERING SYSTEM PROVIDED HISTORY: Shortness of breath TECHNOLOGIST PROVIDED HISTORY: Reason for exam:->Shortness of breath FINDINGS: Calcified granulomata are again noted. There is no confluent airspace consolidation or effusion.   The cardiomediastinal silhouette and pulmonary vessels appear normal.     Stable appearance
middle cerebral arteries. No aneurysm. POSTERIOR CIRCULATION: No significant stenosis of the vertebral, basilar, or posterior cerebral arteries. No aneurysm. OTHER: No dural venous sinus thrombosis on this non-dedicated study. BRAIN: No mass effect or midline shift. No extra-axial fluid collection. The gray-white differentiation is maintained. No large vessel occlusion in the head or neck. MRI brain without contrast    Result Date: 3/13/2023  EXAMINATION: MRI OF THE BRAIN WITHOUT CONTRAST  3/13/2023 4:20 pm TECHNIQUE: Multiplanar multisequence MRI of the brain was performed without the administration of intravenous contrast. COMPARISON: None HISTORY: ORDERING SYSTEM PROVIDED HISTORY: stroke like symptoms TECHNOLOGIST PROVIDED HISTORY: Reason for exam:->stroke like symptoms Reason for Exam: stroke like symptoms-pt had meds and still unable to hold still unable to finish best images-n gre Initial evaluation FINDINGS: INTRACRANIAL STRUCTURES/VENTRICLES: Motion artifact degrades the images. There is no obvious acute infarct. The ventricles and cisternal spaces are prominent consistent with cerebral atrophy. There are confluent areas of high-signal in the periventricular white matter and centrum semiovale that are likely related to chronic small vessel ischemic disease. There is no midline shift or mass effect. There are no areas of restricted diffusion. ORBITS: The visualized portion of the orbits demonstrate no acute abnormality. SINUSES:  The visualized paranasal sinuses and mastoid air cells are for the most part clear. BONES/SOFT TISSUES: The bone marrow signal intensity appears normal. The soft tissues demonstrate no acute abnormality. Motion artifact degrades the images. No obvious acute infarct. Mild cerebral atrophy. Moderate to severe chronic small vessel ischemic changes.        Electronically signed by Rocio Justice MD on 3/19/2023 at 8:04 AM
at 8:35 AM
significant stenosis of the intracranial internal carotid, anterior cerebral, or middle cerebral arteries. No aneurysm. POSTERIOR CIRCULATION: No significant stenosis of the vertebral, basilar, or posterior cerebral arteries. No aneurysm. OTHER: No dural venous sinus thrombosis on this non-dedicated study. BRAIN: No mass effect or midline shift. No extra-axial fluid collection. The gray-white differentiation is maintained. No large vessel occlusion in the head or neck.        Electronically signed by CARLITO Buchanan CNP on 3/13/2023 at 1:39 PM

## 2023-03-24 LAB
CULTURE: NORMAL
CULTURE: NORMAL
Lab: NORMAL
Lab: NORMAL
SPECIMEN: NORMAL
SPECIMEN: NORMAL

## 2023-04-04 ENCOUNTER — TELEPHONE (OUTPATIENT)
Dept: FAMILY MEDICINE CLINIC | Age: 88
End: 2023-04-04

## 2023-04-04 NOTE — TELEPHONE ENCOUNTER
Aminata from [facility] would like to see if PCP will follow orders for nursing PT and OT.  Please Contact at [phone]

## 2023-04-04 NOTE — TELEPHONE ENCOUNTER
Patient is not going w/[facility]-she will followed by [facility]-there was a error on scheduling-kb called form [facility] gave orders that we would cover and do OT and PT-she verbalize understanding

## 2023-04-12 PROBLEM — I67.9 CEREBROVASCULAR DISEASE: Status: ACTIVE | Noted: 2023-03-10

## 2023-04-12 PROBLEM — Z51.5 HOSPICE CARE PATIENT: Status: ACTIVE | Noted: 2023-04-12

## 2023-04-12 PROBLEM — Z91.81 AT HIGH RISK FOR FALLS: Status: ACTIVE | Noted: 2023-04-12

## 2023-04-21 ENCOUNTER — TELEPHONE (OUTPATIENT)
Dept: FAMILY MEDICINE CLINIC | Age: 88
End: 2023-04-21

## 2023-06-12 ENCOUNTER — TELEPHONE (OUTPATIENT)
Dept: FAMILY MEDICINE CLINIC | Age: 88
End: 2023-06-12

## 2023-06-20 ENCOUNTER — TELEPHONE (OUTPATIENT)
Dept: FAMILY MEDICINE CLINIC | Age: 88
End: 2023-06-20

## 2023-06-20 NOTE — TELEPHONE ENCOUNTER
Spoke with Sabra and gave v/o for UA and cx and to d/c dulcolax and change to senna as requested per PCP's note. Sabra verbalized understanding.

## 2023-06-20 NOTE — TELEPHONE ENCOUNTER
Received call from [address] [place] [address] patient Daughter concerned patient having more confusion and her history of UTI . Patient is taking   Cipro 250 MG daily for prevention on routine basis but was wondering if could obtain a U/A to check . Also patient is having a lot more constipation issues and taking more Mirlax. Patient does take Dulcolax on routine basis but was wondering if could discontinue Dulcolax and start Senna Plus 2 tablets daily.    Please advise

## 2023-06-23 ENCOUNTER — TELEPHONE (OUTPATIENT)
Dept: FAMILY MEDICINE CLINIC | Age: 88
End: 2023-06-23

## 2023-06-23 ENCOUNTER — HOSPITAL ENCOUNTER (OUTPATIENT)
Age: 88
Setting detail: SPECIMEN
Discharge: HOME | End: 2023-06-23
Payer: MEDICARE

## 2023-06-23 LAB
BACTERIA: ABNORMAL /HPF
BILIRUBIN URINE: NEGATIVE MG/DL
BLOOD, URINE: NEGATIVE
CAST TYPE: ABNORMAL /HPF
CLARITY: CLEAR
COLOR: YELLOW
CRYSTAL TYPE: NEGATIVE /HPF
EPITHELIAL CELLS, UA: NEGATIVE /HPF
GLUCOSE, URINE: NEGATIVE MG/DL
KETONES, URINE: NEGATIVE MG/DL
LEUKOCYTE ESTERASE, URINE: ABNORMAL
NITRITE URINE, QUANTITATIVE: NEGATIVE
PH, URINE: 6 (ref 5–8)
PROTEIN UA: NEGATIVE MG/DL
RBC URINE: NEGATIVE /HPF (ref 0–6)
SPECIFIC GRAVITY UA: 1.02 (ref 1–1.03)
UROBILINOGEN, URINE: 0.2 MG/DL (ref 0.2–1)
WBC UA: 10 /HPF (ref 0–5)

## 2023-06-23 PROCEDURE — 87077 CULTURE AEROBIC IDENTIFY: CPT

## 2023-06-23 PROCEDURE — 87186 SC STD MICRODIL/AGAR DIL: CPT

## 2023-06-23 PROCEDURE — 87086 URINE CULTURE/COLONY COUNT: CPT

## 2023-06-23 PROCEDURE — 81001 URINALYSIS AUTO W/SCOPE: CPT

## 2023-06-23 RX ORDER — NITROFURANTOIN 25; 75 MG/1; MG/1
100 CAPSULE ORAL 2 TIMES DAILY
Qty: 14 CAPSULE | Refills: 0 | Status: SHIPPED | OUTPATIENT
Start: 2023-06-23 | End: 2023-06-30

## 2023-06-23 NOTE — TELEPHONE ENCOUNTER
Called [facility] nurse spoke with [address] [place] [address]. [address] [place] [address] will ask family but is sure family will be ok with obtaining cath urine specimen.    [address] [place] [address] will call back to let us know

## 2023-06-23 NOTE — TELEPHONE ENCOUNTER
Patient home health nurse [facility] called and haven't been able to obtain urine specimen cause has had stool in urine. Patient states that daughter called this am cause patient hasn't been eating and feels progression of UTI cause she doesn't exhibit normal signs of UTI. Patient has been on Cipro 250 MG daily for prevention. [facility] nurse wondering if provider would sent in antibiotic to treat for UTI .   Please advise

## 2023-06-25 LAB
CULTURE: ABNORMAL
Lab: ABNORMAL
SPECIMEN: ABNORMAL

## 2023-06-30 ENCOUNTER — APPOINTMENT (OUTPATIENT)
Dept: GENERAL RADIOLOGY | Age: 88
End: 2023-06-30
Payer: MEDICARE

## 2023-06-30 ENCOUNTER — HOSPITAL ENCOUNTER (EMERGENCY)
Age: 88
Discharge: HOME | End: 2023-06-30
Attending: EMERGENCY MEDICINE
Payer: MEDICARE

## 2023-06-30 VITALS
HEART RATE: 68 BPM | BODY MASS INDEX: 26.31 KG/M2 | DIASTOLIC BLOOD PRESSURE: 61 MMHG | HEIGHT: 60 IN | WEIGHT: 134 LBS | TEMPERATURE: 97.4 F | RESPIRATION RATE: 19 BRPM | OXYGEN SATURATION: 98 % | SYSTOLIC BLOOD PRESSURE: 142 MMHG

## 2023-06-30 DIAGNOSIS — N30.00 ACUTE CYSTITIS WITHOUT HEMATURIA: Primary | ICD-10-CM

## 2023-06-30 DIAGNOSIS — R53.1 GENERAL WEAKNESS: ICD-10-CM

## 2023-06-30 LAB
ALBUMIN SERPL-MCNC: 4.5 GM/DL (ref 3.4–5)
ALP BLD-CCNC: 83 IU/L (ref 40–129)
ALT SERPL-CCNC: 8 U/L (ref 10–40)
ANION GAP SERPL CALCULATED.3IONS-SCNC: 12 MMOL/L (ref 4–16)
APTT: 33.1 SECONDS (ref 25.1–37.1)
AST SERPL-CCNC: 17 IU/L (ref 15–37)
BACTERIA: ABNORMAL /HPF
BASOPHILS ABSOLUTE: 0 K/CU MM
BASOPHILS RELATIVE PERCENT: 0.7 % (ref 0–1)
BILIRUB SERPL-MCNC: 0.5 MG/DL (ref 0–1)
BILIRUBIN URINE: ABNORMAL MG/DL
BLOOD, URINE: ABNORMAL
BUN SERPL-MCNC: 19 MG/DL (ref 6–23)
CALCIUM SERPL-MCNC: 10.2 MG/DL (ref 8.3–10.6)
CAST TYPE: ABNORMAL /HPF
CHLORIDE BLD-SCNC: 98 MMOL/L (ref 99–110)
CLARITY: CLEAR
CO2: 28 MMOL/L (ref 21–32)
COLOR: YELLOW
CREAT SERPL-MCNC: 0.8 MG/DL (ref 0.6–1.1)
CRYSTAL TYPE: NEGATIVE /HPF
DIFFERENTIAL TYPE: ABNORMAL
EOSINOPHILS ABSOLUTE: 0.1 K/CU MM
EOSINOPHILS RELATIVE PERCENT: 1.5 % (ref 0–3)
EPITHELIAL CELLS, UA: 1 /HPF
GFR SERPL CREATININE-BSD FRML MDRD: >60 ML/MIN/1.73M2
GLUCOSE SERPL-MCNC: 129 MG/DL (ref 70–99)
GLUCOSE, URINE: NEGATIVE MG/DL
HCT VFR BLD CALC: 39 % (ref 37–47)
HEMOGLOBIN: 12.7 GM/DL (ref 12.5–16)
IMMATURE NEUTROPHIL %: 0.2 % (ref 0–0.43)
INR BLD: 1.1 INDEX
KETONES, URINE: 40 MG/DL
LEUKOCYTE ESTERASE, URINE: ABNORMAL
LIPASE: 19 IU/L (ref 13–60)
LYMPHOCYTES ABSOLUTE: 1.3 K/CU MM
LYMPHOCYTES RELATIVE PERCENT: 23 % (ref 24–44)
MCH RBC QN AUTO: 28.8 PG (ref 27–31)
MCHC RBC AUTO-ENTMCNC: 32.6 % (ref 32–36)
MCV RBC AUTO: 88.4 FL (ref 78–100)
MONOCYTES ABSOLUTE: 0.4 K/CU MM
MONOCYTES RELATIVE PERCENT: 7.8 % (ref 0–4)
NITRITE URINE, QUANTITATIVE: NEGATIVE
PDW BLD-RTO: 13.2 % (ref 11.7–14.9)
PH, URINE: 8.5 (ref 5–8)
PLATELET # BLD: 139 K/CU MM (ref 140–440)
PMV BLD AUTO: 12.7 FL (ref 7.5–11.1)
POTASSIUM SERPL-SCNC: 3.7 MMOL/L (ref 3.5–5.1)
PROTEIN UA: 30 MG/DL
PROTHROMBIN TIME: 13.9 SECONDS (ref 8.9–12.9)
RBC # BLD: 4.41 M/CU MM (ref 4.2–5.4)
RBC URINE: 10 /HPF (ref 0–6)
SEGMENTED NEUTROPHILS ABSOLUTE COUNT: 3.7 K/CU MM
SEGMENTED NEUTROPHILS RELATIVE PERCENT: 66.8 % (ref 36–66)
SODIUM BLD-SCNC: 138 MMOL/L (ref 135–145)
SPECIFIC GRAVITY UA: 1.01 (ref 1–1.03)
TOTAL IMMATURE NEUTOROPHIL: 0.01 K/CU MM
TOTAL PROTEIN: 7 GM/DL (ref 6.4–8.2)
TROPONIN T: <0.01 NG/ML
UROBILINOGEN, URINE: 0.2 MG/DL (ref 0.2–1)
WBC # BLD: 5.5 K/CU MM (ref 4–10.5)
WBC UA: 12 /HPF (ref 0–5)

## 2023-06-30 PROCEDURE — 85610 PROTHROMBIN TIME: CPT

## 2023-06-30 PROCEDURE — 87086 URINE CULTURE/COLONY COUNT: CPT

## 2023-06-30 PROCEDURE — 80053 COMPREHEN METABOLIC PANEL: CPT

## 2023-06-30 PROCEDURE — 99285 EMERGENCY DEPT VISIT HI MDM: CPT

## 2023-06-30 PROCEDURE — 2580000003 HC RX 258: Performed by: EMERGENCY MEDICINE

## 2023-06-30 PROCEDURE — 85730 THROMBOPLASTIN TIME PARTIAL: CPT

## 2023-06-30 PROCEDURE — 87077 CULTURE AEROBIC IDENTIFY: CPT

## 2023-06-30 PROCEDURE — 85025 COMPLETE CBC W/AUTO DIFF WBC: CPT

## 2023-06-30 PROCEDURE — 87150 DNA/RNA AMPLIFIED PROBE: CPT

## 2023-06-30 PROCEDURE — 6360000002 HC RX W HCPCS: Performed by: EMERGENCY MEDICINE

## 2023-06-30 PROCEDURE — 93005 ELECTROCARDIOGRAM TRACING: CPT | Performed by: EMERGENCY MEDICINE

## 2023-06-30 PROCEDURE — 96365 THER/PROPH/DIAG IV INF INIT: CPT

## 2023-06-30 PROCEDURE — 96361 HYDRATE IV INFUSION ADD-ON: CPT

## 2023-06-30 PROCEDURE — 87186 SC STD MICRODIL/AGAR DIL: CPT

## 2023-06-30 PROCEDURE — 71045 X-RAY EXAM CHEST 1 VIEW: CPT

## 2023-06-30 PROCEDURE — 81001 URINALYSIS AUTO W/SCOPE: CPT

## 2023-06-30 PROCEDURE — 84484 ASSAY OF TROPONIN QUANT: CPT

## 2023-06-30 PROCEDURE — 87040 BLOOD CULTURE FOR BACTERIA: CPT

## 2023-06-30 PROCEDURE — 83690 ASSAY OF LIPASE: CPT

## 2023-06-30 RX ORDER — DOCUSATE SODIUM 100 MG/1
100 CAPSULE, LIQUID FILLED ORAL 2 TIMES DAILY
COMMUNITY

## 2023-06-30 RX ORDER — TRAZODONE HYDROCHLORIDE 50 MG/1
50 TABLET ORAL NIGHTLY
COMMUNITY

## 2023-06-30 RX ORDER — AMOXICILLIN AND CLAVULANATE POTASSIUM 875; 125 MG/1; MG/1
1 TABLET, FILM COATED ORAL 2 TIMES DAILY
Qty: 20 TABLET | Refills: 0 | Status: SHIPPED | OUTPATIENT
Start: 2023-06-30 | End: 2023-07-10

## 2023-06-30 RX ORDER — 0.9 % SODIUM CHLORIDE 0.9 %
1000 INTRAVENOUS SOLUTION INTRAVENOUS ONCE
Status: COMPLETED | OUTPATIENT
Start: 2023-06-30 | End: 2023-06-30

## 2023-06-30 RX ORDER — SPIRONOLACTONE 25 MG/1
25 TABLET ORAL DAILY
COMMUNITY

## 2023-06-30 RX ADMIN — SODIUM CHLORIDE 1000 ML: 9 INJECTION, SOLUTION INTRAVENOUS at 09:04

## 2023-06-30 RX ADMIN — AMPICILLIN SODIUM AND SULBACTAM SODIUM 3000 MG: 2; 1 INJECTION, POWDER, FOR SOLUTION INTRAMUSCULAR; INTRAVENOUS at 10:30

## 2023-06-30 ASSESSMENT — LIFESTYLE VARIABLES
HOW OFTEN DO YOU HAVE A DRINK CONTAINING ALCOHOL: NEVER
HOW MANY STANDARD DRINKS CONTAINING ALCOHOL DO YOU HAVE ON A TYPICAL DAY: PATIENT DOES NOT DRINK

## 2023-06-30 ASSESSMENT — PAIN - FUNCTIONAL ASSESSMENT: PAIN_FUNCTIONAL_ASSESSMENT: NONE - DENIES PAIN

## 2023-07-01 LAB
CULTURE: ABNORMAL
CULTURE: ABNORMAL
EKG ATRIAL RATE: 63 BPM
EKG DIAGNOSIS: NORMAL
EKG Q-T INTERVAL: 468 MS
EKG QRS DURATION: 102 MS
EKG QTC CALCULATION (BAZETT): 475 MS
EKG R AXIS: -54 DEGREES
EKG T AXIS: 61 DEGREES
EKG VENTRICULAR RATE: 62 BPM
Lab: ABNORMAL
SPECIMEN: ABNORMAL

## 2023-07-01 PROCEDURE — 93010 ELECTROCARDIOGRAM REPORT: CPT | Performed by: INTERNAL MEDICINE

## 2023-07-02 LAB
CULTURE: ABNORMAL
CULTURE: ABNORMAL
CULTURE: NORMAL
Lab: ABNORMAL
Lab: NORMAL
SPECIMEN: ABNORMAL
SPECIMEN: NORMAL

## 2023-07-03 LAB
CULTURE: ABNORMAL
CULTURE: ABNORMAL
Lab: ABNORMAL
SPECIMEN: ABNORMAL

## 2023-07-05 LAB
CULTURE: NORMAL
Lab: NORMAL
SPECIMEN: NORMAL

## 2023-07-12 ENCOUNTER — TELEPHONE (OUTPATIENT)
Dept: FAMILY MEDICINE CLINIC | Age: 88
End: 2023-07-12

## 2023-07-13 NOTE — TELEPHONE ENCOUNTER
[facility] and unable to reach patient   Sima Alberts.   Left message with patient  to return call to the office

## 2023-07-14 ENCOUNTER — TELEPHONE (OUTPATIENT)
Dept: FAMILY MEDICINE CLINIC | Age: 88
End: 2023-07-14

## 2023-07-14 NOTE — TELEPHONE ENCOUNTER
Called Sabra and reviewed providers note. Sabra states have been discussing care of patient with Ana Cristina Pizarro . States Ana Cristina Pizarro states getting harder to care for patient and may needs to look in to long term placement.    Sabra wasn't able to obtain urine but will try again

## 2023-07-14 NOTE — TELEPHONE ENCOUNTER
Spoke with Kb from [facility] and she advised that she was returning [address] [facility] call. Advised kb that Yury Espino from [facility] called and advised that pt was super confused and just wanted to let us know- Advised Kb per Gregorio Holley she was wanting to know if hospice could obtain UA/CS. Kb advised that she is seeing pt today and will try to obtain urine sample    Per Kb she doesn't think pt's symptoms are urine related. States that pt does not eat or drink well and has dementia. States that pt needs more help then what she is getting. Also states that pt just completed ATB the end of June beginning of July.

## 2023-07-14 NOTE — TELEPHONE ENCOUNTER
Called Sabra at [facility] and unable to reach.   Left voice message for her to return call to the office

## 2023-07-26 ENCOUNTER — TELEPHONE (OUTPATIENT)
Dept: FAMILY MEDICINE CLINIC | Age: 88
End: 2023-07-26

## 2023-07-26 NOTE — TELEPHONE ENCOUNTER
Received call from [address] [place] [address] at [facility]. Patient  states she fell last night and was able to use bed rails to get herself up from floor. [address] [place] [address] states no injuries noted but patient states her right knee is sore. [address] [place] [address] wants provider to know that daughter is looking for [address] for patient . Looking at [place] at this time.

## 2023-07-26 NOTE — TELEPHONE ENCOUNTER
Called Sabra Nurse from [facility] and unable to reach   Left voice message for Akhil Vernon to return call to the office

## 2023-08-09 ENCOUNTER — TELEPHONE (OUTPATIENT)
Dept: FAMILY MEDICINE CLINIC | Age: 88
End: 2023-08-09

## 2023-08-09 ENCOUNTER — HOSPITAL ENCOUNTER (OUTPATIENT)
Age: 88
Setting detail: OBSERVATION
LOS: 1 days | Discharge: NURSING, REHAB OR LONG-TERM CARE | End: 2023-08-10
Attending: EMERGENCY MEDICINE | Admitting: FAMILY MEDICINE
Payer: MEDICARE

## 2023-08-09 DIAGNOSIS — R41.82 ALTERED MENTAL STATUS, UNSPECIFIED ALTERED MENTAL STATUS TYPE: Primary | ICD-10-CM

## 2023-08-09 PROBLEM — R62.7 FAILURE TO THRIVE IN ADULT: Status: ACTIVE | Noted: 2023-08-09

## 2023-08-09 LAB
ANION GAP SERPL CALCULATED.3IONS-SCNC: 10 MMOL/L (ref 4–16)
BASOPHILS ABSOLUTE: 0 K/CU MM
BASOPHILS RELATIVE PERCENT: 0.6 % (ref 0–1)
BUN SERPL-MCNC: 13 MG/DL (ref 6–23)
CALCIUM SERPL-MCNC: 7.9 MG/DL (ref 8.3–10.6)
CHLORIDE BLD-SCNC: 103 MMOL/L (ref 99–110)
CO2: 26 MMOL/L (ref 21–32)
CREAT SERPL-MCNC: 0.5 MG/DL (ref 0.6–1.1)
DIFFERENTIAL TYPE: ABNORMAL
EKG ATRIAL RATE: 65 BPM
EKG DIAGNOSIS: NORMAL
EKG P-R INTERVAL: 136 MS
EKG Q-T INTERVAL: 442 MS
EKG QRS DURATION: 96 MS
EKG QTC CALCULATION (BAZETT): 459 MS
EKG R AXIS: -66 DEGREES
EKG T AXIS: 55 DEGREES
EKG VENTRICULAR RATE: 65 BPM
EOSINOPHILS ABSOLUTE: 0.1 K/CU MM
EOSINOPHILS RELATIVE PERCENT: 1.6 % (ref 0–3)
GFR SERPL CREATININE-BSD FRML MDRD: >60 ML/MIN/1.73M2
GLUCOSE BLD-MCNC: 101 MG/DL
GLUCOSE BLD-MCNC: 101 MG/DL (ref 70–99)
GLUCOSE SERPL-MCNC: 97 MG/DL (ref 70–99)
HCT VFR BLD CALC: 41.4 % (ref 37–47)
HEMOGLOBIN: 13.4 GM/DL (ref 12.5–16)
IMMATURE NEUTROPHIL %: 0.2 % (ref 0–0.43)
LYMPHOCYTES ABSOLUTE: 1.4 K/CU MM
LYMPHOCYTES RELATIVE PERCENT: 22.2 % (ref 24–44)
MCH RBC QN AUTO: 28.7 PG (ref 27–31)
MCHC RBC AUTO-ENTMCNC: 32.4 % (ref 32–36)
MCV RBC AUTO: 88.7 FL (ref 78–100)
MONOCYTES ABSOLUTE: 0.4 K/CU MM
MONOCYTES RELATIVE PERCENT: 7.1 % (ref 0–4)
PDW BLD-RTO: 14.6 % (ref 11.7–14.9)
PLATELET # BLD: 138 K/CU MM (ref 140–440)
PMV BLD AUTO: 12.9 FL (ref 7.5–11.1)
POTASSIUM SERPL-SCNC: 4 MMOL/L (ref 3.5–5.1)
RBC # BLD: 4.67 M/CU MM (ref 4.2–5.4)
SEGMENTED NEUTROPHILS ABSOLUTE COUNT: 4.2 K/CU MM
SEGMENTED NEUTROPHILS RELATIVE PERCENT: 68.3 % (ref 36–66)
SODIUM BLD-SCNC: 139 MMOL/L (ref 135–145)
TOTAL IMMATURE NEUTOROPHIL: 0.01 K/CU MM
WBC # BLD: 6.2 K/CU MM (ref 4–10.5)

## 2023-08-09 PROCEDURE — 2580000003 HC RX 258: Performed by: NURSE PRACTITIONER

## 2023-08-09 PROCEDURE — 80048 BASIC METABOLIC PNL TOTAL CA: CPT

## 2023-08-09 PROCEDURE — 96360 HYDRATION IV INFUSION INIT: CPT

## 2023-08-09 PROCEDURE — 94761 N-INVAS EAR/PLS OXIMETRY MLT: CPT

## 2023-08-09 PROCEDURE — 82962 GLUCOSE BLOOD TEST: CPT

## 2023-08-09 PROCEDURE — 94150 VITAL CAPACITY TEST: CPT

## 2023-08-09 PROCEDURE — 93010 ELECTROCARDIOGRAM REPORT: CPT | Performed by: INTERNAL MEDICINE

## 2023-08-09 PROCEDURE — 6370000000 HC RX 637 (ALT 250 FOR IP): Performed by: NURSE PRACTITIONER

## 2023-08-09 PROCEDURE — G0378 HOSPITAL OBSERVATION PER HR: HCPCS

## 2023-08-09 PROCEDURE — 85025 COMPLETE CBC W/AUTO DIFF WBC: CPT

## 2023-08-09 PROCEDURE — 93005 ELECTROCARDIOGRAM TRACING: CPT | Performed by: EMERGENCY MEDICINE

## 2023-08-09 PROCEDURE — 96361 HYDRATE IV INFUSION ADD-ON: CPT

## 2023-08-09 PROCEDURE — 99285 EMERGENCY DEPT VISIT HI MDM: CPT

## 2023-08-09 RX ORDER — POLYETHYLENE GLYCOL 3350 17 G/17G
17 POWDER, FOR SOLUTION ORAL DAILY PRN
Status: DISCONTINUED | OUTPATIENT
Start: 2023-08-09 | End: 2023-08-10 | Stop reason: HOSPADM

## 2023-08-09 RX ORDER — SODIUM CHLORIDE 0.9 % (FLUSH) 0.9 %
5-40 SYRINGE (ML) INJECTION EVERY 12 HOURS SCHEDULED
Status: DISCONTINUED | OUTPATIENT
Start: 2023-08-09 | End: 2023-08-10 | Stop reason: HOSPADM

## 2023-08-09 RX ORDER — ONDANSETRON 4 MG/1
4 TABLET, ORALLY DISINTEGRATING ORAL EVERY 8 HOURS PRN
Status: DISCONTINUED | OUTPATIENT
Start: 2023-08-09 | End: 2023-08-10 | Stop reason: HOSPADM

## 2023-08-09 RX ORDER — OXYBUTYNIN CHLORIDE 10 MG/1
10 TABLET, EXTENDED RELEASE ORAL DAILY
Status: DISCONTINUED | OUTPATIENT
Start: 2023-08-09 | End: 2023-08-10

## 2023-08-09 RX ORDER — ACETAMINOPHEN 650 MG/1
650 SUPPOSITORY RECTAL EVERY 6 HOURS PRN
Status: DISCONTINUED | OUTPATIENT
Start: 2023-08-09 | End: 2023-08-10 | Stop reason: HOSPADM

## 2023-08-09 RX ORDER — SODIUM CHLORIDE 0.9 % (FLUSH) 0.9 %
10 SYRINGE (ML) INJECTION PRN
Status: DISCONTINUED | OUTPATIENT
Start: 2023-08-09 | End: 2023-08-10 | Stop reason: HOSPADM

## 2023-08-09 RX ORDER — SENNA AND DOCUSATE SODIUM 50; 8.6 MG/1; MG/1
2 TABLET, FILM COATED ORAL DAILY
Status: DISCONTINUED | OUTPATIENT
Start: 2023-08-09 | End: 2023-08-10 | Stop reason: HOSPADM

## 2023-08-09 RX ORDER — ALLOPURINOL 100 MG/1
100 TABLET ORAL DAILY
Status: DISCONTINUED | OUTPATIENT
Start: 2023-08-09 | End: 2023-08-10 | Stop reason: HOSPADM

## 2023-08-09 RX ORDER — LEVOTHYROXINE SODIUM 0.1 MG/1
100 TABLET ORAL DAILY
Status: DISCONTINUED | OUTPATIENT
Start: 2023-08-10 | End: 2023-08-10 | Stop reason: HOSPADM

## 2023-08-09 RX ORDER — ACETAMINOPHEN 325 MG/1
650 TABLET ORAL EVERY 6 HOURS PRN
Status: DISCONTINUED | OUTPATIENT
Start: 2023-08-09 | End: 2023-08-10 | Stop reason: HOSPADM

## 2023-08-09 RX ORDER — ONDANSETRON 2 MG/ML
4 INJECTION INTRAMUSCULAR; INTRAVENOUS EVERY 6 HOURS PRN
Status: DISCONTINUED | OUTPATIENT
Start: 2023-08-09 | End: 2023-08-10 | Stop reason: HOSPADM

## 2023-08-09 RX ORDER — SODIUM CHLORIDE 9 MG/ML
INJECTION, SOLUTION INTRAVENOUS CONTINUOUS
Status: DISCONTINUED | OUTPATIENT
Start: 2023-08-09 | End: 2023-08-10 | Stop reason: HOSPADM

## 2023-08-09 RX ORDER — SODIUM CHLORIDE 9 MG/ML
INJECTION, SOLUTION INTRAVENOUS PRN
Status: DISCONTINUED | OUTPATIENT
Start: 2023-08-09 | End: 2023-08-10 | Stop reason: HOSPADM

## 2023-08-09 RX ORDER — AMOXICILLIN 250 MG
2 CAPSULE ORAL DAILY
COMMUNITY

## 2023-08-09 RX ORDER — CIPROFLOXACIN 250 MG/1
250 TABLET, FILM COATED ORAL DAILY
COMMUNITY

## 2023-08-09 RX ADMIN — OXYBUTYNIN CHLORIDE 10 MG: 10 TABLET, EXTENDED RELEASE ORAL at 20:54

## 2023-08-09 RX ADMIN — SODIUM CHLORIDE, PRESERVATIVE FREE 10 ML: 5 INJECTION INTRAVENOUS at 20:54

## 2023-08-09 RX ADMIN — SENNOSIDES AND DOCUSATE SODIUM 2 TABLET: 50; 8.6 TABLET ORAL at 20:54

## 2023-08-09 RX ADMIN — ACETAMINOPHEN 650 MG: 325 TABLET ORAL at 20:54

## 2023-08-09 RX ADMIN — SODIUM CHLORIDE: 9 INJECTION, SOLUTION INTRAVENOUS at 20:56

## 2023-08-09 RX ADMIN — ALLOPURINOL 100 MG: 100 TABLET ORAL at 20:54

## 2023-08-09 ASSESSMENT — PAIN - FUNCTIONAL ASSESSMENT
PAIN_FUNCTIONAL_ASSESSMENT: ACTIVITIES ARE NOT PREVENTED
PAIN_FUNCTIONAL_ASSESSMENT: NONE - DENIES PAIN

## 2023-08-09 ASSESSMENT — PAIN DESCRIPTION - ORIENTATION: ORIENTATION: RIGHT

## 2023-08-09 ASSESSMENT — PAIN DESCRIPTION - ONSET: ONSET: GRADUAL

## 2023-08-09 ASSESSMENT — PAIN DESCRIPTION - FREQUENCY: FREQUENCY: INTERMITTENT

## 2023-08-09 ASSESSMENT — PAIN SCALES - GENERAL
PAINLEVEL_OUTOF10: 3
PAINLEVEL_OUTOF10: 0

## 2023-08-09 ASSESSMENT — PAIN DESCRIPTION - PAIN TYPE: TYPE: CHRONIC PAIN

## 2023-08-09 ASSESSMENT — PAIN DESCRIPTION - LOCATION: LOCATION: RIB CAGE

## 2023-08-09 ASSESSMENT — LIFESTYLE VARIABLES
HOW MANY STANDARD DRINKS CONTAINING ALCOHOL DO YOU HAVE ON A TYPICAL DAY: PATIENT DOES NOT DRINK
HOW OFTEN DO YOU HAVE A DRINK CONTAINING ALCOHOL: NEVER

## 2023-08-09 ASSESSMENT — PAIN DESCRIPTION - DESCRIPTORS: DESCRIPTORS: ACHING

## 2023-08-09 NOTE — ED NOTES
Hospice calling to make aware that she is on hospice and asking plan of care. Made aware of lab work drawn and awaiting disposition.       Basia Gloria RN  08/09/23 4912

## 2023-08-09 NOTE — TELEPHONE ENCOUNTER
Will save this note for Jessica Rocha to review tomorrow. But appears she is currently in the ED and if admitted could potentially be placed right from discharge.

## 2023-08-09 NOTE — ED NOTES
report given to [facility] inpatient. Pt to be transported to floor.      Hetal Camargo RN  08/09/23 0020

## 2023-08-09 NOTE — TELEPHONE ENCOUNTER
Spoke with Mahsa Joiner,  from [facility] and she advised that she is trying to place pt in a nursing home as she is no longer safe to be at home. States that [facility] will take pt as long as we fax over last H&P and med list and that PCP writes a note stating that nothing has changed. Looks like last office visit was 4/11/23. Mahsa Jioner requesting callback if PCP is willing to do this or not. Please advise.

## 2023-08-09 NOTE — PROGRESS NOTES
4 Eyes Skin Assessment     NAME:  Suzanne Ruiz  YOB: 1929  MEDICAL RECORD NUMBER:  [id number]    The patient is being assessed for  Admission    I agree that at least one RN has performed a thorough Head to Toe Skin Assessment on the patient. ALL assessment sites listed below have been assessed. Areas assessed by both nurses:    Head, Face, Ears, Shoulders, Back, Chest, Arms, Elbows, Hands, Sacrum. Buttock, Coccyx, Ischium, and Legs. Feet and Heels  Patient has redness and purple noted to sacrum with small opening at crack of buttocks . Mepilex applied to protect area. Does the Patient have a Wound? Yes wound(s) were present on assessment.  LDA wound assessment was Initiated and completed by RN       Sergio Prevention initiated by RN: Yes  Wound Care Orders initiated by RN: No    Pressure Injury (Stage 3,4, Unstageable, DTI, NWPT, and Complex wounds) if present, place Wound referral order by RN under : No    New Ostomies, if present place, Ostomy referral order under : No     Nurse 1 eSignature: Electronically signed by Juan Linares RN on 8/9/23 at 7:08 PM EDT    **SHARE this note so that the co-signing nurse can place an eSignature**    Nurse 2 eSignature: Electronically signed by King Del Cid RN on 8/10/23 at 7:09 AM EDT

## 2023-08-09 NOTE — CARE COORDINATION
CM consult per Dr Paula Villafana for assistance with discharge planning for pt who is active with [facility]. Family needs pt to go in a nursing facility for continued care as pt is living at home at present while dtr is at work, pt is unable to care for self, requesting that pt discharge to [address] contacted [facility] # [phone]. Spoke with Leonora Bojorquez, on-call RN. About pt wanting to go to [facility].   POC is to admit pt as Observation, pt can stay on hospice care and Hospice RN will be out to hospital tomorrow to initiate process for pt discharge to [address] [place] [address].     CM contacted the ER to let Dr Paula Villafana know to admit pt under Observation status, so pt can remain on hospice and [address] will be at at hospital tomorrow to initiate discharge to [facility]. HILARIORN/CM

## 2023-08-09 NOTE — H&P
redness      Past Medical History:      Past Medical History:   Diagnosis Date    ACP (advance care planning) 8/10/2020    Acute cystitis without hematuria 7/8/2021    Allergic rhinitis     Anxiety     Arthritis     Bradycardia     Cellulitis, leg 1/8/2012    Dementia ([address] [facility])     Depression     Diabetes mellitus ([address] [facility])     sugars controlled off meds    Diabetic eye exam ([address] [facility]) 1/28/16    no retinopathy    Dysuria 9/28/2020    Flu vaccine need 9/28/2020    Gout     Hyperlipidemia     Hypertension     Hypothyroidism     Positive depression screening 7/8/2021       Past Surgical  History:    has a past surgical history that includes Thyroidectomy, partial; Carpal tunnel release; Ulnar tunnel release; hernia repair; Appendectomy (13 yrs ago); Hysterectomy; Colonoscopy; joint replacement; eye surgery; Endoscopy, colon, diagnostic (7/8/13); and hip surgery (Left, 08/22/2016). Social History:    FAM HX: Reviewed  family history includes Cancer in her brother; Heart Attack in her brother; Heart Disease in her father. Soc HX:   Social History     Socioeconomic History    Marital status:      Spouse name: None    Number of children: None    Years of education: None    Highest education level: None   Tobacco Use    Smoking status: Never    Smokeless tobacco: Never   Vaping Use    Vaping Use: Never used   Substance and Sexual Activity    Alcohol use: Yes     Alcohol/week: 1.0 standard drink     Types: 1 Cans of beer per week     Comment: may be less than once weekly    Drug use: No    Sexual activity: Yes     Partners: Male     Comment: states sometimes     TOBACCO:   reports that she has never smoked. She has never used smokeless tobacco.  ETOH:   reports current alcohol use of about 1.0 standard drink per week. Drugs:  reports no history of drug use. Allergies:    Allergies   Allergen Reactions    Codeine Other (See Comments)     Gets \"jittery feeling\"       Home Medications:     Prior to Admission medications cushmela 11/16/22   Barron Bonilla PA-C   levothyroxine (SYNTHROID) 100 MCG tablet TAKE 1 TABLET BY MOUTH IN THE MORNING ON AN EMPTY STOMACH. 11/9/22   Barron Bonilla PA-C   allopurinol (ZYLOPRIM) 100 MG tablet TAKE 1 TABLET BY MOUTH DAILY 10/19/22   Barron Bonilla PA-C   potassium chloride (KLOR-CON M) 20 MEQ extended release tablet TAKE 1 TABLET BY MOUTH DAILY 3/30/22   Barron Bonilla PA-C   furosemide (LASIX) 40 MG tablet Take 1 tablet by mouth daily 3/22/22   Barron Bonilla PA-C   UNABLE TO FIND One oral thermometer 12/27/21   Barron Bonilla PA-C   UNABLE TO FIND Pulse ox 12/27/21   Barron Bonilla PA-C   UNABLE TO FIND Automatic blood pressure machine 12/27/21   Barron Bonilla PA-C   UNABLE TO FIND Left chair 9/23/21   Barron Bonilla PA-C   UNABLE TO FIND rollator walker with seat and brakes 2/12/21   Barron Bonilla PA-C   UNABLE TO FIND Provide one electronic blood pressure cuff for home monitoring 8/29/18   Dione Schaumann, MD   acetaminophen (TYLENOL) 650 MG CR tablet Take 1 tablet by mouth every 8 hours as needed for Pain 3/1/16   Dione Schaumann, MD   Misc. Devices (TUB TRANSFER BOARD) MISC Use as directed 11/11/15   Dione Schaumann, MD   aspirin EC 81 MG EC tablet Take 1 tablet by mouth daily  Patient not taking: Reported on 8/9/2023 9/16/14   Dione Schaumann, MD         Medications:   Medications:    Infusions:   PRN Meds:     Data:     Laboratory this visit:  Reviewed  Recent Labs     08/09/23  1515   WBC 6.2   HGB 13.4   HCT 41.4   *      Recent Labs     08/09/23  1600      K 4.0      CO2 26   BUN 13   CREATININE 0.5*     No results for input(s): AST, ALT, ALB, BILIDIR, BILITOT, ALKPHOS in the last 72 hours. No results for input(s): INR in the last 72 hours. Radiology this visit:  Reviewed. No results found.         EKG this visit:  Reviewed         Electronically signed by CARLITO Mistry CNP on 8/9/2023 at 5:53 PM

## 2023-08-10 VITALS
WEIGHT: 110 LBS | OXYGEN SATURATION: 100 % | HEART RATE: 62 BPM | SYSTOLIC BLOOD PRESSURE: 130 MMHG | RESPIRATION RATE: 16 BRPM | DIASTOLIC BLOOD PRESSURE: 65 MMHG | HEIGHT: 60 IN | BODY MASS INDEX: 21.6 KG/M2 | TEMPERATURE: 97.3 F

## 2023-08-10 LAB
BACTERIA: ABNORMAL /HPF
BILIRUBIN URINE: NEGATIVE MG/DL
BLOOD, URINE: NEGATIVE
CAST TYPE: ABNORMAL /HPF
CLARITY: CLEAR
COLOR: YELLOW
CRYSTAL TYPE: NEGATIVE /HPF
EPITHELIAL CELLS, UA: 6 /HPF
GLUCOSE, URINE: NEGATIVE MG/DL
KETONES, URINE: 15 MG/DL
LEUKOCYTE ESTERASE, URINE: NEGATIVE
NITRITE URINE, QUANTITATIVE: POSITIVE
PH, URINE: 6 (ref 5–8)
PROTEIN UA: NEGATIVE MG/DL
RBC URINE: 3 /HPF (ref 0–6)
SARS-COV-2 RDRP RESP QL NAA+PROBE: NOT DETECTED
SOURCE: NORMAL
SPECIFIC GRAVITY UA: 1.01 (ref 1–1.03)
UROBILINOGEN, URINE: 0.2 MG/DL (ref 0.2–1)
WBC UA: 4 /HPF (ref 0–5)

## 2023-08-10 PROCEDURE — 87077 CULTURE AEROBIC IDENTIFY: CPT

## 2023-08-10 PROCEDURE — G0378 HOSPITAL OBSERVATION PER HR: HCPCS

## 2023-08-10 PROCEDURE — 87635 SARS-COV-2 COVID-19 AMP PRB: CPT

## 2023-08-10 PROCEDURE — 94761 N-INVAS EAR/PLS OXIMETRY MLT: CPT

## 2023-08-10 PROCEDURE — 2580000003 HC RX 258: Performed by: NURSE PRACTITIONER

## 2023-08-10 PROCEDURE — 81001 URINALYSIS AUTO W/SCOPE: CPT

## 2023-08-10 PROCEDURE — 6370000000 HC RX 637 (ALT 250 FOR IP): Performed by: NURSE PRACTITIONER

## 2023-08-10 PROCEDURE — 96361 HYDRATE IV INFUSION ADD-ON: CPT

## 2023-08-10 PROCEDURE — 87186 SC STD MICRODIL/AGAR DIL: CPT

## 2023-08-10 PROCEDURE — 87086 URINE CULTURE/COLONY COUNT: CPT

## 2023-08-10 RX ORDER — CEFUROXIME AXETIL 500 MG/1
500 TABLET ORAL EVERY 12 HOURS SCHEDULED
Qty: 14 TABLET | Refills: 0 | Status: SHIPPED | OUTPATIENT
Start: 2023-08-10 | End: 2023-08-17

## 2023-08-10 RX ORDER — CEFUROXIME AXETIL 250 MG/1
500 TABLET ORAL EVERY 12 HOURS SCHEDULED
Status: DISCONTINUED | OUTPATIENT
Start: 2023-08-10 | End: 2023-08-10 | Stop reason: HOSPADM

## 2023-08-10 RX ORDER — OXYBUTYNIN CHLORIDE 10 MG/1
10 TABLET, EXTENDED RELEASE ORAL NIGHTLY
Status: DISCONTINUED | OUTPATIENT
Start: 2023-08-11 | End: 2023-08-10 | Stop reason: HOSPADM

## 2023-08-10 RX ADMIN — ALLOPURINOL 100 MG: 100 TABLET ORAL at 08:48

## 2023-08-10 RX ADMIN — SODIUM CHLORIDE: 9 INJECTION, SOLUTION INTRAVENOUS at 06:02

## 2023-08-10 RX ADMIN — OXYBUTYNIN CHLORIDE 10 MG: 10 TABLET, EXTENDED RELEASE ORAL at 08:48

## 2023-08-10 RX ADMIN — SENNOSIDES AND DOCUSATE SODIUM 2 TABLET: 50; 8.6 TABLET ORAL at 08:48

## 2023-08-10 RX ADMIN — LEVOTHYROXINE SODIUM 100 MCG: 0.1 TABLET ORAL at 06:00

## 2023-08-10 NOTE — PROGRESS NOTES
[facility] here to take patient to [facility]. Discharge packet and paperwork given to crew. Daughter at bedside.

## 2023-08-10 NOTE — CARE COORDINATION
CM received a call from a  named Violette Parks with [facility]. Violette Parks advised that she had been working on trying to get the patient placed at [facility] \"for the last week and a half\" for LTC. Violette Parks confirmed that they intend to continue providing hospice services following LTC placement. Violette Parks stated that the patient's daughter indicated to them that the patient has [employer]arg[employer] Medicaid\" but then stated \"we don't have the patient's social security number so we couldn't verify the insurance\". CM spoke with Violette Parks about [facility] arranging admission and transportation from [facility] to [facility] and she stated \"it would just be easier for you to arrange admission since she's now in the hospital\". Per chart review the patient was living with her daughter receiving home hospice services. Family is unable to provide 24 hour care of the patient so LTC placement and continuation of hospice services is needed. CM will follow. 10:25 AM  CM spoke with Paula Villafana at [facility] regarding the patient. Paula Villafana advised that [facility] had contacted him yesterday regarding admission and he explained them that all he needed was a current H&P and a med list.  Paula Villafana advised that the patient did not even need to be seen by physician they just needed to receive a current H&P. Paula Villafana advised that he will review the patient's clinical documentation and insurance benefits but feels they should be able to accept the patient today. Paula Villafana will follow-up with this CM regarding their determination. CM will follow. 12:28 PM  CM received a voicemail message from Paula Villafana at [facility] stating that they are able to accept the patient today. 2:14 PM  CM spoke with Randy Henry at [facility], medical transportation will be scheduled between 4:30 PM-5 PM.      2:24 PM  CM notified Paula Villafana at [facility] of the time for transportation. CM will follow.

## 2023-08-10 NOTE — PROGRESS NOTES
Attempted to call Zhou to give report on patient and sat on hold for 10 minutes. Will try again later.

## 2023-08-10 NOTE — PROGRESS NOTES
Cordelia from [facility] called and received report from this nurse. Questions answered and Cordelia notified that patient will be on hospice for them. Patient coming at [address] [place] [address] via [address] [place] [address].

## 2023-08-10 NOTE — CARE COORDINATION
NURSING: The patient will be discharged to [address] today for LTC placement and hospice services will be provided by [facility]. Please complete the [address] form, call report to [phone], and notify the patient's family. Medical transportation was arranged through [address] [place] [address] Road and they will arrive between 4:30 PM- 5PM.  The patient will need a rapid COVID test prior to discharge.

## 2023-08-10 NOTE — PROGRESS NOTES
Patient's daughter, Cathy Lancaster, called and notified that her mother is going to [place] today and is being picked up between 4:30p and [facility] stated she would be here by 4:30 to see her mom before she goes, otherwise she would meet them at [place].

## 2023-08-10 NOTE — PROGRESS NOTES
V2.0    [facility] Progress Note      Name:  Cruzito East /Age/Sex: 8/3/1929  (80 y.o. female)   MRN & CSN:  [id number] & [id number] Encounter Date/Time: 8/10/2023 10:11 AM EDT   Location:   PCP: Sweta Saha PA-C     [facility] [address] Day: 2    Assessment and Recommendations   Cruzito East is a 80 y.o. female  who presents with Failure to thrive in adult      Plan: The patient was initially evaluated 2023 after unresponsive episode at home. The patient's daughter who is primary caregiver was seeking placement under hospice care. Upon arrival to emergency room was back to her baseline mental status. Has an admission with similar presentation 2023 and found to have ESBL UTI. She was admitted under observation for coordination of care and evaluation of unresponsive episode. UA was nitrite positive with slight suprapubic tenderness. Her daughter states she does have urine frequency and incontinence. Also has a history of  frequent UTIs. Failure to thrive in adult  Unresponsive episode- now back to baseline              Patient is under [facility] service               DNR CC-paperwork reviewed bedside              Unremarkable workup while in the emergency room  Daughter Haydee Gardiner [facility]) was agreeable with further investigation of unresponsive episode, IV fluids, and antibiotics. Daughter also agreeable if confusion overnight , ok to administer PRN sedating medications if necessary to keep comfortable/calm to which she did not need.                Dispo: Discharged to [facility] in stable condition under hospice care    Acute cystitis without hematuria              History of ESBL UTI                  UA suggestive of infection   Previous micro reviewed   Last urine culture grew Proteus  Starting Ceftin and Hospice can follow urine culture to customize antibiotics as needed     History of dementia-no longer on Namenda     History of 08/10/23 0624    sodium chloride       PRN Meds: sodium chloride flush, 10 mL, PRN  sodium chloride, , PRN  ondansetron, 4 mg, Q8H PRN   Or  ondansetron, 4 mg, Q6H PRN  polyethylene glycol, 17 g, Daily PRN  acetaminophen, 650 mg, Q6H PRN   Or  acetaminophen, 650 mg, Q6H PRN        Labs and Imaging   No results found. CBC:   Recent Labs     08/09/23  1515   WBC 6.2   HGB 13.4   *     BMP:    Recent Labs     08/09/23  1521 08/09/23  1600   NA  --  139   K  --  4.0   CL  --  103   CO2  --  26   BUN  --  13   CREATININE  --  0.5*   GLUCOSE 101 97     Hepatic: No results for input(s): AST, ALT, ALB, BILITOT, ALKPHOS in the last 72 hours. Lipids:   Lab Results   Component Value Date/Time    CHOL 202 03/13/2023 06:52 AM    CHOL 147 01/15/2020 11:35 AM    HDL 45 03/13/2023 06:52 AM    TRIG 116 03/13/2023 06:52 AM     Hemoglobin A1C:   Lab Results   Component Value Date/Time    LABA1C 6.1 03/13/2023 06:52 AM     TSH:   Lab Results   Component Value Date/Time    TSH 0.58 01/15/2020 11:35 AM     Troponin:   Lab Results   Component Value Date/Time    TROPONINT <0.010 06/30/2023 09:20 AM    TROPONINT <0.010 03/12/2023 05:40 PM    TROPONINT <0.010 12/17/2021 06:05 PM     Lactic Acid: No results for input(s): LACTA in the last 72 hours. BNP: No results for input(s): PROBNP in the last 72 hours.   UA:  Lab Results   Component Value Date/Time    NITRU NEGATIVE 06/30/2023 09:20 AM    NITRU POSITIVE 01/06/2012 08:20 PM    COLORU YELLOW 06/30/2023 09:20 AM    PHUR 6.0 12/13/2022 03:23 PM    WBCUA 12 06/30/2023 09:20 AM    RBCUA 10 06/30/2023 09:20 AM    MUCUS NEGATIVE 12/17/2021 05:45 PM    BACTERIA MODERATE 06/30/2023 09:20 AM    CLARITYU CLEAR 06/30/2023 09:20 AM    SPECGRAV 1.010 06/30/2023 09:20 AM    LEUKOCYTESUR LARGE NUMBER OR AMOUNT OBSERVED 06/30/2023 09:20 AM    UROBILINOGEN 0.2 06/30/2023 09:20 AM    BILIRUBINUR SMALL NUMBER OR AMOUNT OBSERVED 06/30/2023 09:20 AM    BILIRUBINUR negative 12/13/2022 03:23 PM

## 2023-08-10 NOTE — DISCHARGE INSTR - COC
Continuity of Care Form    Patient Name: Tracy Nunn   :  8/3/1929  MRN:  [id number]    [address] date:  2023  Discharge date:  8/10/23    Code Status Order: DNR-CC   Advance Directives:     Admitting Physician:  Yared Hyman MD  PCP: Jorden Koch PA-C    Discharging Nurse: [facility] Unit/Room#: 008/008-01  Discharging Unit Phone Number: [phone]    Emergency Contact:   Extended Emergency Contact Information  Primary Emergency Contact: [address]  Mobile Phone: [phone]  Relation: Child  Secondary Emergency Contact: [address] [place] [address] Benito Lindd Phone: [phone]  Mobile Phone: [phone]  Relation: Child    Past Surgical History:  Past Surgical History:   Procedure Laterality Date    APPENDECTOMY  13 yrs ago    CARPAL TUNNEL RELEASE      bilateral    COLONOSCOPY      ENDOSCOPY, COLON, DIAGNOSTIC  13    gastritis, hiatal hernia    EYE SURGERY      both eyes    HERNIA REPAIR      umbilical    HIP SURGERY Left 2016    ORIF    HYSTERECTOMY (CERVIX STATUS UNKNOWN)      complete    JOINT REPLACEMENT      left knee    THYROIDECTOMY, PARTIAL      ULNAR TUNNEL RELEASE      bilateral       Immunization History:   Immunization History   Administered Date(s) Administered    COVID-19, MODERNA BLUE border, Primary or Immunocompromised, (age 12y+), IM, 100 mcg/0.5mL 2021, 2021    Influenza 2012, 10/08/2013    Influenza Vaccine, unspecified formulation 10/03/2016    Influenza Virus Vaccine 10/08/2014, 2015    Influenza, AFLURIA (age 1 yrs+), FLUZONE, (age 10 mo+), MDV, 0.5mL 2017    Influenza, FLUAD, (age 72 y+), Adjuvanted, 0.5mL 10/01/2021    Influenza, FLUARIX, FLULAVAL, FLUZONE (age 10 mo+) AND AFLURIA, (age 1 y+), PF, 0.5mL 2018, 10/17/2019, 2020    Pneumococcal, PCV-13, PREVNAR 15, (age 6w+), IM, 0.5mL 2015    Pneumococcal, PPSV23, PNEUMOVAX 23, (age 2y+), SC/IM, 0.5mL 06/05/2013    Td, unspecified formulation 01/07/2012       Active Problems:  Patient Active Problem List   Diagnosis Code    Type 2 diabetes mellitus without complication, without long-term current use of insulin ([address]) E11.9    Essential hypertension I10    Hypothyroidism E03.9    Hyperlipidemia E78.5    Neuropathy, peripheral G62.9    Monoclonal gammopathies D47.2    Artificial knee joint present Z96.659    Primary osteoarthritis involving multiple joints S74.2    Diastolic CHF ([facility]) X86.78    Hypokalemia E87.6    Neck pain M54.2    VBI (vertebrobasilar insufficiency) G45.0    Major depressive disorder, recurrent episode, in partial remission ([facility]) F33.41    Simple chronic bronchitis ([facility]) J41.0    Bilateral hearing loss H91.93    CKD (chronic kidney disease), stage III ([facility]) N18.30    Hyperuricemia E79.0    Primary osteoarthritis of first carpometacarpal joint of left hand M18.12    History of gout Z87.39    Dependent edema R60.9    Vertigo R42    Bilateral leg pain M79.604, M79.605    Claudication ([facility]) I73.9    Myalgia M79.10    Primary insomnia F51.01    Gastroesophageal reflux disease without esophagitis K21.9    Chest wall pain R07.89    Memory difficulties R41.3    Skin lesion of left arm L98.9    Acute cystitis without hematuria N30.00    Transient alteration of awareness R40.4    Chest pain R07.9    Dementia without behavioral disturbance ([facility]) F03.90    Dark stools R19.5    History of recurrent UTIs Z87.440    Stroke-like symptoms R29.90    Cerebrovascular disease I67.9    Hospice care patient Z51.5    At high risk for falls Z91.81    Failure to thrive in adult R62.7       Isolation/Infection:   Isolation            No Isolation          Patient Infection Status       Infection Onset Added Last Indicated Last Indicated By Review Planned Expiration Resolved Resolved By    ESBL (Extended Spectrum Beta Lactamase) 01/17/23 01/19/23 06/23/23 Culture, Urine                Nurse Assessment:  Last Vital Signs: BP

## 2023-08-10 NOTE — DISCHARGE INSTR - DIET

## 2023-08-10 NOTE — TELEPHONE ENCOUNTER
Called Shanique from [facility] and reviewed providers note with Moriah Wang. Moriah Wang is going to call  at hospital for updated H&P on patient to send to Kali Newton states if she would needs anything for PCP she will contact office.

## 2023-08-12 LAB
CULTURE: ABNORMAL
CULTURE: ABNORMAL
Lab: ABNORMAL
SPECIMEN: ABNORMAL

## 2024-03-11 NOTE — TELEPHONE ENCOUNTER
"Patient Name: Brain Snyder   MRN: [id number]   Admission Date: 2/21/2024   Hospital Length of Stay: 19   Attending Provider: Stan Lazar MD   Consulting Provider: Kris Matt M.D.  Reason for Consult: Goals of Care  Primary Care Physician: Nidia Shepherd NP     Principal Problem: CAD (coronary artery disease)     Patient information was obtained from patient, relative(s), and ER records.      Final diagnoses:  [R07.9] Chest pain     Assessment/Plan:     We met with the patient along with a portable . The patient verified upon introduction of our service, "I've still got some fight in me." We asked permission to review his goals of care. He asked that his family might be present for this. We contacted his daughter, Brii who stated that she lives in [place] and could not return, but that her sister, Allegra lives nearby. We will try to reach out to her and ask her to come in for a visit. We expect this to occur in the AM based on this conversation. We explained to Brii that this was not urgent and that the patient is currently stable.    We reviewed the patient's current clinical status with the nurse. We reviewed clinical documentation, labs and imaging.     Symptom management review: denies      History of Present Illness:     78 y/o M h/o CKDIIIb, AF, severe AS s/p AVR 2/16/24, global hypokinesis, systolic CHF EF 30%, severe pulmonary hypertension, currently admitted with NSTEMI s/p CABG on 2/27/24, urethral stricture with catheter, AF with RVR and required cardioversion x 2 after CABG, right groin purulent cellulitis with growth of pseudomonas and serratia on antibiotics. He also has a large Left pleural effusion, felt by pulmonologist to be stable. We were consulted to review goals of care with patient and family.      Active Ambulatory Problems     Diagnosis Date Noted    Mixed hyperlipidemia 02/15/2024    Cardiomyopathy 02/15/2024    Aortic valve regurgitation 02/15/2024    Chest " It has been filled out and placed in fax box.  I think it has already been sent pain 02/15/2024    CAD (coronary artery disease) 02/15/2024    Hypertension 02/15/2024    NSTEMI (non-ST elevated myocardial infarction) 02/22/2024    Atrial fibrillation 02/22/2024     Resolved Ambulatory Problems     Diagnosis Date Noted    No Resolved Ambulatory Problems     Past Medical History:   Diagnosis Date    A-fib     Aortic insufficiency         Past Surgical History:   Procedure Laterality Date    AORTIC VALVE REPLACEMENT N/A 2/27/2024    Procedure: Replacement-valve-aortic;  Surgeon: Nita Contreras MD;  Location: [facility] OR;  Service: Cardiothoracic;  Laterality: N/A;    CORONARY ANGIOGRAPHY N/A 2/20/2024    Procedure: ANGIOGRAM, CORONARY ARTERY;  Surgeon: Vasile Callahan MD;  Location: [facility] CATH LAB;  Service: Cardiology;  Laterality: N/A;    CORONARY ARTERY BYPASS GRAFT (CABG) N/A 2/27/2024    Procedure: CORONARY ARTERY BYPASS GRAFT (CABG);  Surgeon: Nita Contreras MD;  Location: [facility];  Service: Cardiothoracic;  Laterality: N/A;    ENDOSCOPIC HARVEST OF VEIN N/A 2/27/2024    Procedure: SURGICAL PROCUREMENT, VEIN, ENDOSCOPIC;  Surgeon: Nita Contreras MD;  Location: [facility] OR;  Service: Cardiothoracic;  Laterality: N/A;    IMPELLA, REMOVAL Right 2/27/2024    Procedure: Impella, Removal;  Surgeon: Niat Contreras MD;  Location: [facility];  Service: Cardiothoracic;  Laterality: Right;    INSERTION OF INTRAVASCULAR MICROAXIAL BLOOD PUMP N/A 2/23/2024    Procedure: INSERTION, IMPELLA;  Surgeon: All Montoya MD;  Location: [facility] CATH LAB;  Service: Cardiology;  Laterality: N/A;  with swanz    RIGHT HEART CATHETERIZATION N/A 2/22/2024    Procedure: INSERTION, CATHETER, RIGHT HEART;  Surgeon: Shreya Lomax MD;  Location: [facility] CATH LAB;  Service: Cardiology;  Laterality: N/A;        Review of patient's allergies indicates:  No Known Allergies       Current Facility-Administered Medications:     acetaminophen oral solution 650 mg, 650 mg, Per OG tube, Q6H PRN, Vasile Carter  JENN AVILA    acetaminophen tablet 650 mg, 650 mg, Oral, Q6H PRN, Pablo Yepez MD, 650 mg at 03/11/24 1117    albumin human 5% bottle 12.5 g, 12.5 g, Intravenous, PRN, Vasile Carter PA-C, Stopped at 02/28/24 1442    allopurinol split tablet 50 mg, 50 mg, Oral, Daily, Tanner Rdz MD, 50 mg at 03/11/24 1117    amiodarone tablet 200 mg, 200 mg, Oral, Daily, Timothy Palomaresa, FNP, 200 mg at 03/11/24 1118    aspirin EC tablet 81 mg, 81 mg, Oral, Daily, Vasile Carter PA-C, 81 mg at 03/11/24 1117    atorvastatin tablet 40 mg, 40 mg, Oral, QHS, Tanner Rdz MD, 40 mg at 03/10/24 2133    ceFEPIme (MAXIPIME) 2 g in dextrose 5 % in water (D5W) 100 mL IVPB (MB+), 2 g, Intravenous, Q12H, Elieser Pace MD, Stopped at 03/11/24 1324    dextrose 10% bolus 125 mL 125 mL, 12.5 g, Intravenous, PRN, Pablo Yepez MD    dextrose 10% bolus 250 mL 250 mL, 25 g, Intravenous, PRN, Pablo Yepez MD    electrolyte-A infusion, , Intravenous, PRN, Pablo Yepez MD, Stopped at 02/28/24 0700    enoxaparin injection 40 mg, 40 mg, Subcutaneous, Daily, Cherry Suarez FNP, 40 mg at 03/10/24 1628    ferrous sulfate tablet 1 each, 1 tablet, Oral, Every other day, Tanner Rdz MD, 1 each at 03/10/24 0829    folic acid tablet 1 mg, 1 mg, Oral, Daily, Vasile Carter PA-C, 1 mg at 03/11/24 1117    furosemide tablet 20 mg, 20 mg, Oral, BID, MarielleTimothy batemana, FNP, 20 mg at 03/11/24 1118    heparin, porcine (PF) 100 unit/mL injection flush 500 Units, 5 mL, Intravenous, On Call Procedure, Pablo Yepez MD    heparin, porcine (PF) 100 unit/mL injection flush 500 Units, 5 mL, Intravenous, On Call Procedure, Nita Contreras MD    HYDROcodone-acetaminophen 5-325 mg per tablet 1 tablet, 1 tablet, Oral, Q6H PRN, Stan Lazar MD, 1 tablet at 03/10/24 8488    lactulose 10 gram/15 ml solution 20 g, 20 g, Oral, Q6H PRN, Vasile Carter, PA-C    loperamide capsule 2 mg, 2 mg, Oral,  "Continuous PRN, Vasile Carter PA-C, 2 mg at 03/02/24 1253    melatonin tablet 6 mg, 6 mg, Oral, Nightly PRN, Tanner Rdz MD, 6 mg at 03/09/24 2108    metoclopramide injection 5 mg, 5 mg, Intravenous, Q6H PRN, Vasile Carter PA-C    metoprolol succinate (TOPROL-XL) 24 hr split tablet 12.5 mg, 12.5 mg, Oral, Daily, Cherry Suarez FNP, 12.5 mg at 03/11/24 1117    nitroGLYCERIN SL tablet 0.4 mg, 0.4 mg, Sublingual, Q5 Min PRN, Tanner Rdz MD    ondansetron disintegrating tablet 8 mg, 8 mg, Oral, Q8H PRN, Tanner Rdz MD, 8 mg at 03/11/24 1253    ondansetron injection 8 mg, 8 mg, Intravenous, Q6H PRN, Pablo Yepez MD, 8 mg at 03/10/24 1330    pantoprazole EC tablet 40 mg, 40 mg, Oral, Daily, Tanner Rdz MD, 40 mg at 03/11/24 1117    simethicone chewable tablet 80 mg, 1 tablet, Oral, TID PRN, Tanner Rdz MD, 80 mg at 03/11/24 1253    sodium chloride 0.9% flush 10 mL, 10 mL, Intravenous, PRN, Tanner Rdz MD    sodium chloride 0.9% flush 10 mL, 10 mL, Intravenous, PRN, Millie Jimenez NP    sucralfate tablet 1 g, 1 g, Oral, QID (AC & HS), Vasile Carter PA-C, 1 g at 03/11/24 1121     acetaminophen, acetaminophen, albumin human 5%, dextrose 10%, dextrose 10%, electrolyte-A, heparin, porcine (PF), heparin, porcine (PF), HYDROcodone-acetaminophen, lactulose 10 gram/15 ml, loperamide, melatonin, metoclopramide, nitroGLYCERIN, ondansetron, ondansetron, simethicone, sodium chloride 0.9%, sodium chloride 0.9%     No family history on file.     Review of Systems   Unable to perform ROS: Other            Objective:   BP (!) 105/48   Pulse 83   Temp 98 °F (36.7 °C) (Oral)   Resp 18   Ht 5' 5" (1.651 m)   Wt 85.6 kg (188 lb 11.2 oz)   SpO2 96%   BMI 31.40 kg/m²      Physical Exam  Vitals reviewed.   Constitutional:       Appearance: He is ill-appearing.   HENT:      Head: Normocephalic.      Right Ear: External ear normal.      Left Ear: " External ear normal.      Nose: Nose normal.      Mouth/Throat:      Mouth: Mucous membranes are moist.      Pharynx: Oropharynx is clear.   Eyes:      Extraocular Movements: Extraocular movements intact.      Conjunctiva/sclera: Conjunctivae normal.      Pupils: Pupils are equal, round, and reactive to light.   Pulmonary:      Effort: Pulmonary effort is normal.   Abdominal:      General: Abdomen is flat.      Palpations: Abdomen is soft.   Skin:     General: Skin is warm.   Neurological:      Mental Status: He is alert.            Review of Symptoms  Review of Symptoms      Symptom Assessment (ESAS 0-10 Scale)  Unable to complete assessment due to Other         Pain Assessment:  OME in 24 hours:  0  Location(s):      Pain Assessment in Advanced Demential Scale (PAINAD)   Breathing - Independent of vocalization:  0  Negative vocalization:  0  Facial expression:  0  Body language:  0  Consolability:  0  Total:  0    Psychosocial/Cultural:   See Palliative Psychosocial Note: No  **Primary  to Follow**  Palliative Care  Consult: No     Time-Based Charting:  Yes    Total Time Spent: 0 minutes      Advance Care Planning   Advance Directives:     Decision Making:  Patient answered questions            Caregiver burden formerly assessed: Yes and No        > 50% of 32 min of encounter was spent in chart review, face to face discussion of goals of care, symptom assessment, coordination of care and emotional support.     Kris Matt M.D.  Palliative Medicine  [facility] Observ[facility]

## 2024-03-26 ENCOUNTER — HOSPITAL ENCOUNTER (EMERGENCY)
Age: 89
Discharge: HOME | End: 2024-03-26
Attending: EMERGENCY MEDICINE
Payer: MEDICARE

## 2024-03-26 ENCOUNTER — APPOINTMENT (OUTPATIENT)
Dept: GENERAL RADIOLOGY | Age: 89
End: 2024-03-26
Attending: EMERGENCY MEDICINE
Payer: MEDICARE

## 2024-03-26 VITALS
OXYGEN SATURATION: 100 % | HEART RATE: 77 BPM | SYSTOLIC BLOOD PRESSURE: 112 MMHG | HEIGHT: 62 IN | BODY MASS INDEX: 21.71 KG/M2 | TEMPERATURE: 97.8 F | RESPIRATION RATE: 12 BRPM | DIASTOLIC BLOOD PRESSURE: 95 MMHG | WEIGHT: 118 LBS

## 2024-03-26 DIAGNOSIS — R06.02 SHORTNESS OF BREATH: Primary | ICD-10-CM

## 2024-03-26 LAB
ALBUMIN SERPL-MCNC: 4.1 GM/DL (ref 3.4–5)
ALP BLD-CCNC: 72 IU/L (ref 40–129)
ALT SERPL-CCNC: 15 U/L (ref 10–40)
ANION GAP SERPL CALCULATED.3IONS-SCNC: 12 MMOL/L (ref 7–16)
AST SERPL-CCNC: 22 IU/L (ref 15–37)
BASE EXCESS MIXED: 3.8 (ref 0–3)
BASE EXCESS: ABNORMAL (ref 0–3)
BASOPHILS ABSOLUTE: 0.1 K/CU MM
BASOPHILS RELATIVE PERCENT: 1 % (ref 0–1)
BILIRUB SERPL-MCNC: 0.4 MG/DL (ref 0–1)
BUN SERPL-MCNC: 28 MG/DL (ref 6–23)
CALCIUM SERPL-MCNC: 9.3 MG/DL (ref 8.3–10.6)
CHLORIDE BLD-SCNC: 101 MMOL/L (ref 99–110)
CO2 CONTENT: 29.9 MMOL/L (ref 21–32)
CO2: 28 MMOL/L (ref 21–32)
CREAT SERPL-MCNC: 0.8 MG/DL (ref 0.6–1.1)
DIFFERENTIAL TYPE: ABNORMAL
EOSINOPHILS ABSOLUTE: 0.3 K/CU MM
EOSINOPHILS RELATIVE PERCENT: 5.2 % (ref 0–3)
GFR SERPL CREATININE-BSD FRML MDRD: 68 ML/MIN/1.73M2
GLUCOSE BLD-MCNC: 75 MG/DL (ref 70–99)
GLUCOSE SERPL-MCNC: 103 MG/DL (ref 70–99)
HCO3 VENOUS: 28.6 MMOL/L (ref 22–29)
HCT VFR BLD CALC: 40.7 % (ref 37–47)
HEMOGLOBIN: 13.3 GM/DL (ref 12.5–16)
IMMATURE NEUTROPHIL %: 0.3 % (ref 0–0.43)
LYMPHOCYTES ABSOLUTE: 1.4 K/CU MM
LYMPHOCYTES RELATIVE PERCENT: 24 % (ref 24–44)
MCH RBC QN AUTO: 29.4 PG (ref 27–31)
MCHC RBC AUTO-ENTMCNC: 32.7 % (ref 32–36)
MCV RBC AUTO: 89.8 FL (ref 78–100)
MONOCYTES ABSOLUTE: 0.5 K/CU MM
MONOCYTES RELATIVE PERCENT: 7.9 % (ref 0–4)
O2 SAT, VEN: 42.5 % (ref 50–70)
PCO2, VEN: 42.9 MMHG (ref 41–51)
PDW BLD-RTO: 13.2 % (ref 11.7–14.9)
PH VENOUS: 7.43 (ref 7.32–7.43)
PLATELET # BLD: 177 K/CU MM (ref 140–440)
PMV BLD AUTO: 10.7 FL (ref 7.5–11.1)
PO2, VEN: 23.4 MMHG (ref 28–48)
POTASSIUM SERPL-SCNC: 3.8 MMOL/L (ref 3.5–5.1)
RBC # BLD: 4.53 M/CU MM (ref 4.2–5.4)
SEGMENTED NEUTROPHILS ABSOLUTE COUNT: 3.7 K/CU MM
SEGMENTED NEUTROPHILS RELATIVE PERCENT: 61.6 % (ref 36–66)
SODIUM BLD-SCNC: 141 MMOL/L (ref 135–145)
SOURCE, BLOOD GAS: ABNORMAL
TOTAL IMMATURE NEUTOROPHIL: 0.02 K/CU MM
TOTAL PROTEIN: 6.9 GM/DL (ref 6.4–8.2)
TROPONIN, HIGH SENSITIVITY: 21 NG/L (ref 0–14)
WBC # BLD: 6 K/CU MM (ref 4–10.5)

## 2024-03-26 PROCEDURE — 84484 ASSAY OF TROPONIN QUANT: CPT

## 2024-03-26 PROCEDURE — 85025 COMPLETE CBC W/AUTO DIFF WBC: CPT

## 2024-03-26 PROCEDURE — 99285 EMERGENCY DEPT VISIT HI MDM: CPT

## 2024-03-26 PROCEDURE — 82962 GLUCOSE BLOOD TEST: CPT

## 2024-03-26 PROCEDURE — 93005 ELECTROCARDIOGRAM TRACING: CPT | Performed by: EMERGENCY MEDICINE

## 2024-03-26 PROCEDURE — 71045 X-RAY EXAM CHEST 1 VIEW: CPT

## 2024-03-26 PROCEDURE — 80053 COMPREHEN METABOLIC PANEL: CPT

## 2024-03-26 RX ORDER — LANOLIN ALCOHOL/MO/W.PET/CERES
400 CREAM (GRAM) TOPICAL DAILY
COMMUNITY

## 2024-03-26 RX ORDER — VENLAFAXINE HYDROCHLORIDE 75 MG/1
75 CAPSULE, EXTENDED RELEASE ORAL DAILY
COMMUNITY

## 2024-03-26 RX ORDER — POLYETHYLENE GLYCOL 3350 17 G/17G
17 POWDER, FOR SOLUTION ORAL DAILY
COMMUNITY

## 2024-03-26 ASSESSMENT — PAIN - FUNCTIONAL ASSESSMENT: PAIN_FUNCTIONAL_ASSESSMENT: NONE - DENIES PAIN

## 2024-03-26 ASSESSMENT — ENCOUNTER SYMPTOMS: SHORTNESS OF BREATH: 1

## 2024-03-26 NOTE — ED TRIAGE NOTES
Pt arrives via EMS for reports of SOB, ABD pain and CP. Pt lives at [facility]. Per staff, the pt was at 80% SpO2 on 6L via NC. Pt stating at 100% on 6L via NC at this time. Pt titrated down to 4L. Pt is a DNRCC.

## 2024-03-26 NOTE — ED PROVIDER NOTES
Triage Chief Complaint:   Shortness of Breath and Abdominal Pain    [place]:  Jazmyn Nesbitt is a 89 y.o. female that presents to the ED from the [facility] where she had a reported episode of shortness of breath she was found by staff with reported sat of 80%.  She is placed on 6 L nasal cannula she presents to the ED currently awake alert oriented to self and location this is titrated down to 2 L currently at 100% as I enter the room.  Patient denies any active symptoms currently the time of onset associated symptoms are very challenging.  The daughters at the bedside was unaware until the nursing home called her patient is having no active pain or pressure in the chest denies any cough.  No febrile illness.  I was able to obtain the medical records recently she has had complications of an ESBL UTI but no active symptoms.        Past Medical History:   Diagnosis Date    ACP (advance care planning) 8/10/2020    Acute cystitis without hematuria 7/8/2021    Allergic rhinitis     Anxiety     Arthritis     Bradycardia     Cellulitis, leg 1/8/2012    Dementia ([facility])     Depression     Diabetes mellitus ([facility])     sugars controlled off meds    Diabetic eye exam ([facility]) 1/28/16    no retinopathy    Dysuria 9/28/2020    Flu vaccine need 9/28/2020    Gout     Hyperlipidemia     Hypertension     Hypothyroidism     Positive depression screening 7/8/2021     Past Surgical History:   Procedure Laterality Date    APPENDECTOMY  13 yrs ago    CARPAL TUNNEL RELEASE      bilateral    COLONOSCOPY      ENDOSCOPY, COLON, DIAGNOSTIC  7/8/13    gastritis, hiatal hernia    EYE SURGERY      both eyes    HERNIA REPAIR      umbilical    HIP SURGERY Left 08/22/2016    ORIF    HYSTERECTOMY (CERVIX STATUS UNKNOWN)      complete    JOINT REPLACEMENT      left knee    THYROIDECTOMY, PARTIAL      ULNAR TUNNEL RELEASE      bilateral     Family History   Problem Relation Age of Onset    Heart Disease Father     Cancer Brother     Heart Attack Brother

## 2024-03-27 LAB
EKG ATRIAL RATE: 312 BPM
EKG DIAGNOSIS: NORMAL
EKG Q-T INTERVAL: 372 MS
EKG QRS DURATION: 84 MS
EKG QTC CALCULATION (BAZETT): 420 MS
EKG R AXIS: -56 DEGREES
EKG T AXIS: 46 DEGREES
EKG VENTRICULAR RATE: 77 BPM

## 2024-03-27 PROCEDURE — 93010 ELECTROCARDIOGRAM REPORT: CPT | Performed by: INTERNAL MEDICINE

## 2024-04-09 ENCOUNTER — APPOINTMENT (OUTPATIENT)
Dept: CT IMAGING | Age: 89
End: 2024-04-09
Payer: MEDICARE

## 2024-04-09 ENCOUNTER — APPOINTMENT (OUTPATIENT)
Dept: GENERAL RADIOLOGY | Age: 89
End: 2024-04-09
Payer: MEDICARE

## 2024-04-09 ENCOUNTER — HOSPITAL ENCOUNTER (EMERGENCY)
Age: 89
Discharge: HOME | End: 2024-04-09
Attending: EMERGENCY MEDICINE
Payer: MEDICARE

## 2024-04-09 VITALS
HEART RATE: 77 BPM | BODY MASS INDEX: 22.08 KG/M2 | DIASTOLIC BLOOD PRESSURE: 80 MMHG | TEMPERATURE: 97.9 F | RESPIRATION RATE: 20 BRPM | OXYGEN SATURATION: 100 % | SYSTOLIC BLOOD PRESSURE: 160 MMHG | HEIGHT: 62 IN | WEIGHT: 120 LBS

## 2024-04-09 DIAGNOSIS — R06.00 DYSPNEA AND RESPIRATORY ABNORMALITIES: ICD-10-CM

## 2024-04-09 DIAGNOSIS — N39.0 URINARY TRACT INFECTION WITHOUT HEMATURIA, SITE UNSPECIFIED: Primary | ICD-10-CM

## 2024-04-09 DIAGNOSIS — R06.89 DYSPNEA AND RESPIRATORY ABNORMALITIES: ICD-10-CM

## 2024-04-09 DIAGNOSIS — Z66 DNR (DO NOT RESUSCITATE): ICD-10-CM

## 2024-04-09 DIAGNOSIS — R10.9 ABDOMINAL PAIN, UNSPECIFIED ABDOMINAL LOCATION: ICD-10-CM

## 2024-04-09 LAB
ALBUMIN SERPL-MCNC: 4.4 GM/DL (ref 3.4–5)
ALP BLD-CCNC: 66 IU/L (ref 40–129)
ALT SERPL-CCNC: 18 U/L (ref 10–40)
ANION GAP SERPL CALCULATED.3IONS-SCNC: 18 MMOL/L (ref 7–16)
AST SERPL-CCNC: 33 IU/L (ref 15–37)
BACTERIA: ABNORMAL /HPF
BASE EXCESS MIXED: 4.1 (ref 0–3)
BASE EXCESS: ABNORMAL (ref 0–3)
BASOPHILS ABSOLUTE: 0 K/CU MM
BASOPHILS RELATIVE PERCENT: 0.7 % (ref 0–1)
BILIRUB SERPL-MCNC: 0.3 MG/DL (ref 0–1)
BILIRUBIN URINE: NEGATIVE MG/DL
BLOOD, URINE: NEGATIVE
BUN SERPL-MCNC: 27 MG/DL (ref 6–23)
CALCIUM SERPL-MCNC: 10 MG/DL (ref 8.3–10.6)
CAST TYPE: ABNORMAL /HPF
CHLORIDE BLD-SCNC: 96 MMOL/L (ref 99–110)
CLARITY: ABNORMAL
CO2 CONTENT: 27.8 MMOL/L (ref 21–32)
CO2: 23 MMOL/L (ref 21–32)
COLOR: YELLOW
CREAT SERPL-MCNC: 0.8 MG/DL (ref 0.6–1.1)
CRYSTAL TYPE: NEGATIVE /HPF
DIFFERENTIAL TYPE: ABNORMAL
EOSINOPHILS ABSOLUTE: 0.2 K/CU MM
EOSINOPHILS RELATIVE PERCENT: 4.1 % (ref 0–3)
EPITHELIAL CELLS, UA: 4 /HPF
GFR SERPL CREATININE-BSD FRML MDRD: 68 ML/MIN/1.73M2
GLUCOSE SERPL-MCNC: 130 MG/DL (ref 70–99)
GLUCOSE, URINE: NEGATIVE MG/DL
HCO3 VENOUS: 26.8 MMOL/L (ref 22–29)
HCT VFR BLD CALC: 38.5 % (ref 37–47)
HEMOGLOBIN: 13.1 GM/DL (ref 12.5–16)
IMMATURE NEUTROPHIL %: 1 % (ref 0–0.43)
INFLUENZA A ANTIGEN: NOT DETECTED
INFLUENZA B ANTIGEN: NOT DETECTED
KETONES, URINE: NEGATIVE MG/DL
LACTIC ACID, SEPSIS: 2.1 MMOL/L (ref 0.4–2)
LEUKOCYTE ESTERASE, URINE: ABNORMAL
LIPASE: 13 IU/L (ref 13–60)
LYMPHOCYTES ABSOLUTE: 1.8 K/CU MM
LYMPHOCYTES RELATIVE PERCENT: 30.3 % (ref 24–44)
MCH RBC QN AUTO: 30 PG (ref 27–31)
MCHC RBC AUTO-ENTMCNC: 34 % (ref 32–36)
MCV RBC AUTO: 88.1 FL (ref 78–100)
MONOCYTES ABSOLUTE: 0.5 K/CU MM
MONOCYTES RELATIVE PERCENT: 7.7 % (ref 0–4)
NEUTROPHILS RELATIVE PERCENT: 56.2 % (ref 36–66)
NITRITE URINE, QUANTITATIVE: POSITIVE
O2 SAT, VEN: 61.9 % (ref 50–70)
PCO2, VEN: 33.3 MMHG (ref 41–51)
PDW BLD-RTO: 13 % (ref 11.7–14.9)
PH VENOUS: 7.51 (ref 7.32–7.43)
PH, URINE: 7 (ref 5–8)
PLATELET # BLD: 207 K/CU MM (ref 140–440)
PMV BLD AUTO: 10.7 FL (ref 7.5–11.1)
PO2, VEN: 28.5 MMHG (ref 28–48)
POTASSIUM SERPL-SCNC: 4.8 MMOL/L (ref 3.5–5.1)
PRO-BNP: 206.4 PG/ML
PROTEIN UA: NEGATIVE MG/DL
RBC # BLD: 4.37 M/CU MM (ref 4.2–5.4)
RBC URINE: 2 /HPF (ref 0–6)
SARS-COV-2 RDRP RESP QL NAA+PROBE: NOT DETECTED
SEGMENTED NEUTROPHILS ABSOLUTE COUNT: 3.3 K/CU MM
SODIUM BLD-SCNC: 137 MMOL/L (ref 135–145)
SOURCE, BLOOD GAS: ABNORMAL
SOURCE: NORMAL
SPECIFIC GRAVITY UA: 1.01 (ref 1–1.03)
TOTAL IMMATURE NEUTOROPHIL: 0.06 K/CU MM
TOTAL PROTEIN: 8.1 GM/DL (ref 6.4–8.2)
TROPONIN, HIGH SENSITIVITY: 22 NG/L (ref 0–14)
TROPONIN, HIGH SENSITIVITY: 24 NG/L (ref 0–14)
TSH SERPL DL<=0.005 MIU/L-ACNC: 7.71 UIU/ML (ref 0.27–4.2)
UROBILINOGEN, URINE: 0.2 MG/DL (ref 0.2–1)
WBC # BLD: 5.9 K/CU MM (ref 4–10.5)
WBC UA: 20 /HPF (ref 0–5)

## 2024-04-09 PROCEDURE — 2580000003 HC RX 258: Performed by: EMERGENCY MEDICINE

## 2024-04-09 PROCEDURE — 6360000004 HC RX CONTRAST MEDICATION: Performed by: EMERGENCY MEDICINE

## 2024-04-09 PROCEDURE — 81001 URINALYSIS AUTO W/SCOPE: CPT

## 2024-04-09 PROCEDURE — 87502 INFLUENZA DNA AMP PROBE: CPT

## 2024-04-09 PROCEDURE — 2500000003 HC RX 250 WO HCPCS: Performed by: EMERGENCY MEDICINE

## 2024-04-09 PROCEDURE — 83605 ASSAY OF LACTIC ACID: CPT

## 2024-04-09 PROCEDURE — 99285 EMERGENCY DEPT VISIT HI MDM: CPT

## 2024-04-09 PROCEDURE — 87086 URINE CULTURE/COLONY COUNT: CPT

## 2024-04-09 PROCEDURE — 83880 ASSAY OF NATRIURETIC PEPTIDE: CPT

## 2024-04-09 PROCEDURE — 87088 URINE BACTERIA CULTURE: CPT

## 2024-04-09 PROCEDURE — 87635 SARS-COV-2 COVID-19 AMP PRB: CPT

## 2024-04-09 PROCEDURE — 96365 THER/PROPH/DIAG IV INF INIT: CPT

## 2024-04-09 PROCEDURE — 84484 ASSAY OF TROPONIN QUANT: CPT

## 2024-04-09 PROCEDURE — 85025 COMPLETE CBC W/AUTO DIFF WBC: CPT

## 2024-04-09 PROCEDURE — 93005 ELECTROCARDIOGRAM TRACING: CPT | Performed by: EMERGENCY MEDICINE

## 2024-04-09 PROCEDURE — 96375 TX/PRO/DX INJ NEW DRUG ADDON: CPT

## 2024-04-09 PROCEDURE — 80053 COMPREHEN METABOLIC PANEL: CPT

## 2024-04-09 PROCEDURE — 71045 X-RAY EXAM CHEST 1 VIEW: CPT

## 2024-04-09 PROCEDURE — A4216 STERILE WATER/SALINE, 10 ML: HCPCS | Performed by: EMERGENCY MEDICINE

## 2024-04-09 PROCEDURE — 74177 CT ABD & PELVIS W/CONTRAST: CPT

## 2024-04-09 PROCEDURE — 6360000002 HC RX W HCPCS: Performed by: EMERGENCY MEDICINE

## 2024-04-09 PROCEDURE — 87186 SC STD MICRODIL/AGAR DIL: CPT

## 2024-04-09 PROCEDURE — 96372 THER/PROPH/DIAG INJ SC/IM: CPT

## 2024-04-09 PROCEDURE — 83690 ASSAY OF LIPASE: CPT

## 2024-04-09 PROCEDURE — 84443 ASSAY THYROID STIM HORMONE: CPT

## 2024-04-09 RX ORDER — CEPHALEXIN 500 MG/1
500 CAPSULE ORAL 2 TIMES DAILY
Qty: 20 CAPSULE | Refills: 0 | Status: SHIPPED | OUTPATIENT
Start: 2024-04-09 | End: 2024-04-19

## 2024-04-09 RX ORDER — ONDANSETRON 2 MG/ML
4 INJECTION INTRAMUSCULAR; INTRAVENOUS EVERY 30 MIN PRN
Status: DISCONTINUED | OUTPATIENT
Start: 2024-04-09 | End: 2024-04-09 | Stop reason: HOSPADM

## 2024-04-09 RX ORDER — DICYCLOMINE HYDROCHLORIDE 10 MG/ML
20 INJECTION INTRAMUSCULAR ONCE
Status: COMPLETED | OUTPATIENT
Start: 2024-04-09 | End: 2024-04-09

## 2024-04-09 RX ORDER — 0.9 % SODIUM CHLORIDE 0.9 %
1000 INTRAVENOUS SOLUTION INTRAVENOUS ONCE
Status: COMPLETED | OUTPATIENT
Start: 2024-04-09 | End: 2024-04-09

## 2024-04-09 RX ADMIN — DICYCLOMINE HYDROCHLORIDE 20 MG: 10 INJECTION, SOLUTION INTRAMUSCULAR at 19:21

## 2024-04-09 RX ADMIN — ONDANSETRON 4 MG: 2 INJECTION INTRAMUSCULAR; INTRAVENOUS at 19:16

## 2024-04-09 RX ADMIN — FAMOTIDINE 20 MG: 10 INJECTION, SOLUTION INTRAVENOUS at 19:17

## 2024-04-09 RX ADMIN — IOPAMIDOL 100 ML: 755 INJECTION, SOLUTION INTRAVENOUS at 20:28

## 2024-04-09 RX ADMIN — SODIUM CHLORIDE 1000 ML: 9 INJECTION, SOLUTION INTRAVENOUS at 19:46

## 2024-04-09 RX ADMIN — CEFTRIAXONE SODIUM 1000 MG: 1 INJECTION, POWDER, FOR SOLUTION INTRAMUSCULAR; INTRAVENOUS at 20:06

## 2024-04-09 ASSESSMENT — ENCOUNTER SYMPTOMS
EYES NEGATIVE: 1
ALLERGIC/IMMUNOLOGIC NEGATIVE: 1
SHORTNESS OF BREATH: 1
ABDOMINAL PAIN: 1

## 2024-04-09 ASSESSMENT — PAIN SCALES - PAIN ASSESSMENT IN ADVANCED DEMENTIA (PAINAD)
CONSOLABILITY: NO NEED TO CONSOLE
TOTALSCORE: 4
BODYLANGUAGE: TENSE, DISTRESSED PACING, FIDGETING
NEGVOCALIZATION: OCCASIONAL MOAN/GROAN, LOW SPEECH, NEGATIVE/DISAPPROVING QUALITY
FACIALEXPRESSION: SAD, FRIGHTENED, FROWN
BREATHING: OCCASIONAL LABORED BREATHING, SHORT PERIOD OF HYPERVENTILATION

## 2024-04-09 ASSESSMENT — PAIN DESCRIPTION - ORIENTATION: ORIENTATION: LOWER

## 2024-04-09 ASSESSMENT — PAIN DESCRIPTION - LOCATION: LOCATION: ABDOMEN

## 2024-04-09 ASSESSMENT — PAIN - FUNCTIONAL ASSESSMENT: PAIN_FUNCTIONAL_ASSESSMENT: PAIN ASSESSMENT IN ADVANCED DEMENTIA (PAINAD)

## 2024-04-09 ASSESSMENT — PAIN SCALES - GENERAL: PAINLEVEL_OUTOF10: 10

## 2024-04-09 ASSESSMENT — PAIN DESCRIPTION - DESCRIPTORS: DESCRIPTORS: TENDER

## 2024-04-09 NOTE — ED PROVIDER NOTES
following radiograph was interpreted by myself in the absence of a radiologist:  [x] Radiologist's Report Reviewed:    CXR, CT ABD/PELV    XR CHEST PORTABLE    Result Date: 3/26/2024  EXAM: XR CHEST PORTABLE INDICATION: Shortness of breath TECHNIQUE: Portable frontal chest radiographs. COMPARISON: Chest x-ray June 30, 2023 FINDINGS: HEART / MEDIASTINUM: No change. Normal heart size. Tortuous thoracic aorta. Atherosclerosis. LUNGS/PLEURA: New. Patchy left lung base density. No change. Calcified bilateral pulmonary nodules. No effusion. No pneumothorax. BONES / SOFT TISSUES: No change. No acute abnormality. OTHER: None.     New patchy airspace density left lung base. Electronically signed by Edy Pressley DO      EKG (if obtained): (All EKG's are interpreted by myself in the absence of a cardiologist)    12 lead EKG per my interpretation:  Normal Sinus Rhythm 79 LAFB  Almond is   Left axis deviation  QTc is   440  There is no specific T wave changes appreciated.  There is no specific ST wave changes appreciated.    Prior EKG to compare with was not available       MDM:    Patient with complaint of shortness of breath hypoxia.  Again patient came from nursing home EMS states they were concerned her saturation was 61% at the nursing home however they state they are worried that the pulse oximeter was not working as they have had normal vital signs entire time.  Patient appears anxious on arrival and she is not confused but is anxious..  Patient is complaining of some abdominal pain on exam.  She is tachypneic here appears anxious but otherwise vital signs are stable.  She is a DNR CC paperwork in hand.  No family present no staff present from nursing home.  Just complains of shortness of breath abdominal pain.  Due to her symptoms, age will check labs, imaging chest x-ray CT abdomen pelvis will perform cardiac workup flu COVID etc. patient rechecked here she is on room air the entire time satting 100% she is now calm she

## 2024-04-10 NOTE — ED NOTES
Pt discharged with instructions and pt's daughter stated understanding and called nursing home that, patient was coming back.  Pt wheeled out of the ER

## 2024-04-11 LAB
EKG ATRIAL RATE: 78 BPM
EKG DIAGNOSIS: NORMAL
EKG P AXIS: 42 DEGREES
EKG P-R INTERVAL: 188 MS
EKG Q-T INTERVAL: 384 MS
EKG QRS DURATION: 100 MS
EKG QTC CALCULATION (BAZETT): 440 MS
EKG R AXIS: -56 DEGREES
EKG T AXIS: 62 DEGREES
EKG VENTRICULAR RATE: 79 BPM

## 2024-04-11 PROCEDURE — 93010 ELECTROCARDIOGRAM REPORT: CPT | Performed by: INTERNAL MEDICINE

## 2024-04-12 ENCOUNTER — TELEPHONE (OUTPATIENT)
Dept: PHARMACY | Age: 89
End: 2024-04-12

## 2024-04-12 LAB
CULTURE: ABNORMAL
CULTURE: ABNORMAL
Lab: ABNORMAL
SPECIMEN: ABNORMAL

## 2024-04-12 NOTE — PROGRESS NOTES
Pharmacy Note  ED Culture Follow-up    Jazmyn Nesbitt is a 89 y.o. female.     Allergies: Codeine     Labs:  Lab Results   Component Value Date    BUN 27 (H) 04/09/2024    CREATININE 0.8 04/09/2024    WBC 5.9 04/09/2024     Estimated Creatinine Clearance: 34 mL/min (based on SCr of 0.8 mg/dL).    Current antimicrobials:   Cephalexin    ASSESSMENT:  Micro results:   Urine culture: positive for E. Coli >100,000 CFU/m;     PLAN:  Need for intervention: Yes, but will defer to provider at nursing home  Chosen treatment:    Called [facility] and informed of urine culture result. Leroyt requesting result be faxed for MD to review at facility.    Patient response:   Called and spoke with [facility]. Result faxed to [facility] at [phone]    Called/sent in prescription to: Not applicable    Please call with any questions. Ext. [phone]    Emmanuelle Sanchez RPH, PharmD 10:53 AM 4/12/2024

## 2024-04-12 NOTE — TELEPHONE ENCOUNTER
Pharmacy Note  ED Culture Follow-up    Jazmyn Nesbitt is a 89 y.o. female.     Allergies: Codeine     Labs:  Lab Results   Component Value Date    BUN 27 (H) 04/09/2024    CREATININE 0.8 04/09/2024    WBC 5.9 04/09/2024     Estimated Creatinine Clearance: 34 mL/min (based on SCr of 0.8 mg/dL).    Current antimicrobials:   Cephalexin    ASSESSMENT:  Micro results:   Urine culture: positive for E. Coli >100,000 CFU/m;     PLAN:  Need for intervention: Yes, but will defer to provider at nursing home  Chosen treatment:    Called [facility] and informed of urine culture result. Leroyt requesting result be faxed for MD to review at facility.    Patient response:   Called and spoke with [facility]. Result faxed to [facility] at [phone]    Called/sent in prescription to: Not applicable    Please call with any questions. Ext. [phone]    Emmanuelle Sanchez RPH, PharmD 10:53 AM 4/12/2024

## 2024-12-08 ENCOUNTER — APPOINTMENT (OUTPATIENT)
Dept: CT IMAGING | Age: 88
End: 2024-12-08
Payer: MEDICARE

## 2024-12-08 ENCOUNTER — APPOINTMENT (OUTPATIENT)
Dept: GENERAL RADIOLOGY | Age: 88
End: 2024-12-08
Payer: MEDICARE

## 2024-12-08 ENCOUNTER — HOSPITAL ENCOUNTER (EMERGENCY)
Age: 88
Discharge: HOME | End: 2024-12-09
Attending: EMERGENCY MEDICINE
Payer: MEDICARE

## 2024-12-08 DIAGNOSIS — R06.00 DYSPNEA, UNSPECIFIED TYPE: Primary | ICD-10-CM

## 2024-12-08 DIAGNOSIS — Z66 DNR (DO NOT RESUSCITATE): ICD-10-CM

## 2024-12-08 DIAGNOSIS — R51.9 NONINTRACTABLE HEADACHE, UNSPECIFIED CHRONICITY PATTERN, UNSPECIFIED HEADACHE TYPE: ICD-10-CM

## 2024-12-08 DIAGNOSIS — F03.90 DEMENTIA, UNSPECIFIED DEMENTIA SEVERITY, UNSPECIFIED DEMENTIA TYPE, UNSPECIFIED WHETHER BEHAVIORAL, PSYCHOTIC, OR MOOD DISTURBANCE OR ANXIETY (HCC): ICD-10-CM

## 2024-12-08 DIAGNOSIS — R10.9 ABDOMINAL PAIN, UNSPECIFIED ABDOMINAL LOCATION: ICD-10-CM

## 2024-12-08 LAB
ALBUMIN SERPL-MCNC: 4.1 G/DL (ref 3.4–5)
ALBUMIN/GLOB SERPL: 1.3 {RATIO} (ref 1.1–2.2)
ALP SERPL-CCNC: 93 U/L (ref 40–129)
ALT SERPL-CCNC: 13 U/L (ref 10–40)
AMORPH SED URNS QL MICRO: PRESENT
ANION GAP SERPL CALCULATED.3IONS-SCNC: 17 MMOL/L (ref 4–16)
AST SERPL-CCNC: 20 U/L (ref 15–37)
B PARAP IS1001 DNA NPH QL NAA+NON-PROBE: NOT DETECTED
B PERT DNA SPEC QL NAA+PROBE: NOT DETECTED
BACTERIA URNS QL MICRO: ABNORMAL
BASOPHILS # BLD: 0.05 K/UL
BASOPHILS NFR BLD: 1 % (ref 0–1)
BILIRUB SERPL-MCNC: 0.4 MG/DL (ref 0–1)
BILIRUB UR QL STRIP: NEGATIVE
BNP SERPL-MCNC: 398 PG/ML (ref 0–450)
BUN SERPL-MCNC: 23 MG/DL (ref 6–23)
C PNEUM DNA NPH QL NAA+NON-PROBE: NOT DETECTED
CALCIUM SERPL-MCNC: 10.2 MG/DL (ref 8.3–10.6)
CHLORIDE SERPL-SCNC: 100 MMOL/L (ref 99–110)
CK SERPL-CCNC: 58 U/L (ref 26–140)
CLARITY UR: CLEAR
CO2 SERPL-SCNC: 23 MMOL/L (ref 21–32)
COLOR UR: YELLOW
CREAT SERPL-MCNC: 0.9 MG/DL (ref 0.6–1.1)
EOSINOPHIL # BLD: 0.19 K/UL
EOSINOPHILS RELATIVE PERCENT: 3 % (ref 0–3)
EPI CELLS #/AREA URNS HPF: ABNORMAL /HPF
ERYTHROCYTE [DISTWIDTH] IN BLOOD BY AUTOMATED COUNT: 12.6 % (ref 11.7–14.9)
ERYTHROCYTE [SEDIMENTATION RATE] IN BLOOD BY WESTERGREN METHOD: 23 MM/HR (ref 0–30)
FLUAV RNA NPH QL NAA+NON-PROBE: NOT DETECTED
FLUBV RNA NPH QL NAA+NON-PROBE: NOT DETECTED
GFR, ESTIMATED: 59 ML/MIN/1.73M2
GLUCOSE SERPL-MCNC: 100 MG/DL (ref 70–99)
GLUCOSE UR STRIP-MCNC: NEGATIVE MG/DL
HADV DNA NPH QL NAA+NON-PROBE: NOT DETECTED
HCOV 229E RNA NPH QL NAA+NON-PROBE: NOT DETECTED
HCOV HKU1 RNA NPH QL NAA+NON-PROBE: NOT DETECTED
HCOV NL63 RNA NPH QL NAA+NON-PROBE: NOT DETECTED
HCOV OC43 RNA NPH QL NAA+NON-PROBE: NOT DETECTED
HCT VFR BLD AUTO: 41.3 % (ref 37–47)
HGB BLD-MCNC: 13.5 G/DL (ref 12.5–16)
HGB UR QL STRIP.AUTO: NEGATIVE
HMPV RNA NPH QL NAA+NON-PROBE: NOT DETECTED
HPIV1 RNA NPH QL NAA+NON-PROBE: NOT DETECTED
HPIV2 RNA NPH QL NAA+NON-PROBE: NOT DETECTED
HPIV3 RNA NPH QL NAA+NON-PROBE: NOT DETECTED
HPIV4 RNA NPH QL NAA+NON-PROBE: NOT DETECTED
IMM GRANULOCYTES # BLD AUTO: 0.02 K/UL
IMM GRANULOCYTES NFR BLD: 0 %
KETONES UR STRIP-MCNC: NEGATIVE MG/DL
LACTATE BLDV-SCNC: 1.8 MMOL/L (ref 0.4–2)
LEUKOCYTE ESTERASE UR QL STRIP: ABNORMAL
LIPASE SERPL-CCNC: 15 U/L (ref 13–60)
LYMPHOCYTES NFR BLD: 1.6 K/UL
LYMPHOCYTES RELATIVE PERCENT: 24 % (ref 24–44)
M PNEUMO DNA NPH QL NAA+NON-PROBE: NOT DETECTED
MAGNESIUM SERPL-MCNC: 1.8 MG/DL (ref 1.8–2.4)
MCH RBC QN AUTO: 29.3 PG (ref 27–31)
MCHC RBC AUTO-ENTMCNC: 32.7 G/DL (ref 32–36)
MCV RBC AUTO: 89.8 FL (ref 78–100)
MONOCYTES NFR BLD: 0.45 K/UL
MONOCYTES NFR BLD: 7 % (ref 0–4)
NEUTROPHILS NFR BLD: 65 % (ref 36–66)
NEUTS SEG NFR BLD: 4.3 K/UL
NITRITE UR QL STRIP: NEGATIVE
PH UR STRIP: 8 [PH] (ref 5–8)
PLATELET # BLD AUTO: 174 K/UL (ref 140–440)
PMV BLD AUTO: 10.6 FL (ref 7.5–11.1)
POTASSIUM SERPL-SCNC: 4.1 MMOL/L (ref 3.5–5.1)
PROT SERPL-MCNC: 7.3 G/DL (ref 6.4–8.2)
PROT UR STRIP-MCNC: NEGATIVE MG/DL
RBC # BLD AUTO: 4.6 M/UL (ref 4.2–5.4)
RBC #/AREA URNS HPF: ABNORMAL /HPF
RSV RNA NPH QL NAA+NON-PROBE: NOT DETECTED
RV+EV RNA NPH QL NAA+NON-PROBE: NOT DETECTED
SARS-COV-2 RDRP RESP QL NAA+PROBE: NOT DETECTED
SARS-COV-2 RNA NPH QL NAA+NON-PROBE: NOT DETECTED
SODIUM SERPL-SCNC: 140 MMOL/L (ref 135–145)
SP GR UR STRIP: 1.01 (ref 1–1.03)
SPECIMEN DESCRIPTION: NORMAL
SPECIMEN DESCRIPTION: NORMAL
TROPONIN I SERPL HS-MCNC: 25 NG/L (ref 0–13)
TROPONIN I SERPL HS-MCNC: 26 NG/L (ref 0–13)
UROBILINOGEN UR STRIP-ACNC: 0.2 EU/DL (ref 0–1)
WBC #/AREA URNS HPF: ABNORMAL /HPF
WBC OTHER # BLD: 6.6 K/UL (ref 4–10.5)

## 2024-12-08 PROCEDURE — 85652 RBC SED RATE AUTOMATED: CPT

## 2024-12-08 PROCEDURE — 80053 COMPREHEN METABOLIC PANEL: CPT

## 2024-12-08 PROCEDURE — 93005 ELECTROCARDIOGRAM TRACING: CPT | Performed by: EMERGENCY MEDICINE

## 2024-12-08 PROCEDURE — 86140 C-REACTIVE PROTEIN: CPT

## 2024-12-08 PROCEDURE — 96376 TX/PRO/DX INJ SAME DRUG ADON: CPT

## 2024-12-08 PROCEDURE — 83735 ASSAY OF MAGNESIUM: CPT

## 2024-12-08 PROCEDURE — 71045 X-RAY EXAM CHEST 1 VIEW: CPT

## 2024-12-08 PROCEDURE — 83690 ASSAY OF LIPASE: CPT

## 2024-12-08 PROCEDURE — 87635 SARS-COV-2 COVID-19 AMP PRB: CPT

## 2024-12-08 PROCEDURE — 83605 ASSAY OF LACTIC ACID: CPT

## 2024-12-08 PROCEDURE — 6360000002 HC RX W HCPCS: Performed by: EMERGENCY MEDICINE

## 2024-12-08 PROCEDURE — 6370000000 HC RX 637 (ALT 250 FOR IP): Performed by: EMERGENCY MEDICINE

## 2024-12-08 PROCEDURE — 83880 ASSAY OF NATRIURETIC PEPTIDE: CPT

## 2024-12-08 PROCEDURE — 99285 EMERGENCY DEPT VISIT HI MDM: CPT

## 2024-12-08 PROCEDURE — 96375 TX/PRO/DX INJ NEW DRUG ADDON: CPT

## 2024-12-08 PROCEDURE — 84484 ASSAY OF TROPONIN QUANT: CPT

## 2024-12-08 PROCEDURE — 70450 CT HEAD/BRAIN W/O DYE: CPT

## 2024-12-08 PROCEDURE — 6360000004 HC RX CONTRAST MEDICATION: Performed by: EMERGENCY MEDICINE

## 2024-12-08 PROCEDURE — 74174 CTA ABD&PLVS W/CONTRAST: CPT

## 2024-12-08 PROCEDURE — 96374 THER/PROPH/DIAG INJ IV PUSH: CPT

## 2024-12-08 PROCEDURE — 87086 URINE CULTURE/COLONY COUNT: CPT

## 2024-12-08 PROCEDURE — 85025 COMPLETE CBC W/AUTO DIFF WBC: CPT

## 2024-12-08 PROCEDURE — 81001 URINALYSIS AUTO W/SCOPE: CPT

## 2024-12-08 PROCEDURE — 82550 ASSAY OF CK (CPK): CPT

## 2024-12-08 PROCEDURE — 0202U NFCT DS 22 TRGT SARS-COV-2: CPT

## 2024-12-08 RX ORDER — ONDANSETRON 2 MG/ML
4 INJECTION INTRAMUSCULAR; INTRAVENOUS EVERY 30 MIN PRN
Status: DISCONTINUED | OUTPATIENT
Start: 2024-12-08 | End: 2024-12-09 | Stop reason: HOSPADM

## 2024-12-08 RX ORDER — DICYCLOMINE HCL 20 MG
20 TABLET ORAL 4 TIMES DAILY
Qty: 40 TABLET | Refills: 0 | Status: SHIPPED | OUTPATIENT
Start: 2024-12-08

## 2024-12-08 RX ORDER — IOPAMIDOL 755 MG/ML
100 INJECTION, SOLUTION INTRAVASCULAR
Status: COMPLETED | OUTPATIENT
Start: 2024-12-08 | End: 2024-12-08

## 2024-12-08 RX ORDER — POLYETHYLENE GLYCOL 3350 17 G/17G
17 POWDER, FOR SOLUTION ORAL DAILY PRN
Qty: 30 PACKET | Refills: 0 | Status: SHIPPED | OUTPATIENT
Start: 2024-12-08 | End: 2025-01-07

## 2024-12-08 RX ORDER — FAMOTIDINE 20 MG/1
10 TABLET, FILM COATED ORAL 2 TIMES DAILY
Qty: 14 TABLET | Refills: 0 | Status: SHIPPED | OUTPATIENT
Start: 2024-12-08

## 2024-12-08 RX ORDER — FENTANYL CITRATE 50 UG/ML
25 INJECTION, SOLUTION INTRAMUSCULAR; INTRAVENOUS ONCE
Status: COMPLETED | OUTPATIENT
Start: 2024-12-08 | End: 2024-12-08

## 2024-12-08 RX ORDER — ACETAMINOPHEN 325 MG/1
650 TABLET ORAL ONCE
Status: COMPLETED | OUTPATIENT
Start: 2024-12-08 | End: 2024-12-08

## 2024-12-08 RX ORDER — ACETAMINOPHEN 325 MG/1
650 TABLET ORAL EVERY 6 HOURS PRN
Qty: 120 TABLET | Refills: 3 | Status: SHIPPED | OUTPATIENT
Start: 2024-12-08

## 2024-12-08 RX ORDER — CALCIUM CARBONATE 500 MG/1
1 TABLET, CHEWABLE ORAL DAILY
Qty: 30 TABLET | Refills: 0 | Status: SHIPPED | OUTPATIENT
Start: 2024-12-08 | End: 2025-01-07

## 2024-12-08 RX ADMIN — IOPAMIDOL 100 ML: 755 INJECTION, SOLUTION INTRAVENOUS at 20:40

## 2024-12-08 RX ADMIN — FENTANYL CITRATE 25 MCG: 50 INJECTION INTRAMUSCULAR; INTRAVENOUS at 19:37

## 2024-12-08 RX ADMIN — ONDANSETRON 4 MG: 2 INJECTION, SOLUTION INTRAMUSCULAR; INTRAVENOUS at 19:37

## 2024-12-08 RX ADMIN — FENTANYL CITRATE 25 MCG: 50 INJECTION INTRAMUSCULAR; INTRAVENOUS at 23:21

## 2024-12-08 RX ADMIN — ACETAMINOPHEN 650 MG: 325 TABLET ORAL at 19:35

## 2024-12-09 VITALS
OXYGEN SATURATION: 100 % | HEIGHT: 62 IN | WEIGHT: 120 LBS | BODY MASS INDEX: 22.08 KG/M2 | DIASTOLIC BLOOD PRESSURE: 95 MMHG | SYSTOLIC BLOOD PRESSURE: 159 MMHG | RESPIRATION RATE: 18 BRPM | HEART RATE: 81 BPM

## 2024-12-09 LAB
CRP SERPL HS-MCNC: <3 MG/L (ref 0–5)
EKG ATRIAL RATE: 340 BPM
EKG DIAGNOSIS: NORMAL
EKG Q-T INTERVAL: 374 MS
EKG QRS DURATION: 86 MS
EKG QTC CALCULATION (BAZETT): 439 MS
EKG R AXIS: -54 DEGREES
EKG T AXIS: 48 DEGREES
EKG VENTRICULAR RATE: 83 BPM

## 2024-12-09 PROCEDURE — 93010 ELECTROCARDIOGRAM REPORT: CPT | Performed by: INTERNAL MEDICINE

## 2024-12-09 NOTE — ED PROVIDER NOTES
I performed a medical screening history and physical exam on this patient.    HISTORY OF PRESENT ILLNESS  Jazmyn Nesbitt is a 89 y.o. female with concern for acute hypoxia.  Per EMS, sats in the 60s on nonrebreather prior to arrival.  On my assessment, patient is complaining of abdominal pain \"like my stomach is being smashed.\".  She is also complaining of chest pain and back pain.        PHYSICAL EXAM  ED Triage Vitals   BP Systolic BP Percentile Diastolic BP Percentile Temp Temp src Pulse Resp SpO2   -- -- -- -- -- -- -- --      Height Weight         -- --             On exam, the patient appears well-nourished and in no acute distress. Breathing is unlabored. On arrival to the ED, patient's sats found to be normal on room air.  No significant peripheral edema.    Care discussed with patient's daughter who is at bedside.  Will initiate evaluation.  She confirms patient is DNR CC.        Natalie Saha,   12/08/24 [id number]    
Parainfluenza 1 PCR Not Detected Not Detected    Parainfluenza 2 PCR Not Detected Not Detected    Parainfluenza 3 PCR Not Detected Not Detected    Parainfluenza 4 PCR Not Detected Not Detected    Resp Syncytial Virus PCR Not Detected Not Detected    Bordetella parapertussis by PCR Not Detected Not Detected    B Pertussis by PCR Not Detected Not Detected    Chlamydia pneumoniae By PCR Not Detected Not Detected    Mycoplasma pneumo by PCR Not Detected Not Detected   Urinalysis   Result Value Ref Range    Color, UA Yellow Yellow    Turbidity UA Clear Clear    Glucose, Ur NEGATIVE NEGATIVE mg/dL    Bilirubin, Urine NEGATIVE NEGATIVE    Ketones, Urine NEGATIVE NEGATIVE mg/dL    Specific Gravity, UA 1.015 1.005 - 1.030    Urine Hgb NEGATIVE NEGATIVE    pH, Urine 8.0 5.0 - 8.0    Protein, UA NEGATIVE NEGATIVE mg/dL    Urobilinogen, Urine 0.2 0.0 - 1.0 EU/dL    Nitrite, Urine NEGATIVE NEGATIVE    Leukocyte Esterase, Urine TRACE (A) NEGATIVE   Troponin   Result Value Ref Range    Troponin, High Sensitivity 25 (H) 0 - 13 ng/L   Magnesium   Result Value Ref Range    Magnesium 1.8 1.8 - 2.4 mg/dL   Comprehensive Metabolic Panel   Result Value Ref Range    Sodium 140 135 - 145 mmol/L    Potassium 4.1 3.5 - 5.1 mmol/L    Chloride 100 99 - 110 mmol/L    CO2 23 21 - 32 mmol/L    Anion Gap 17 (H) 4 - 16 mmol/L    Glucose 100 (H) 70 - 99 mg/dL    BUN 23 6 - 23 mg/dL    Creatinine 0.9 0.6 - 1.1 mg/dL    Est, Glom Filt Rate 59 (L) >60 mL/min/1.73m2    Calcium 10.2 8.3 - 10.6 mg/dL    Total Protein 7.3 6.4 - 8.2 g/dL    Albumin 4.1 3.4 - 5.0 g/dL    Albumin/Globulin Ratio 1.3 1.1 - 2.2    Total Bilirubin 0.4 0.0 - 1.0 mg/dL    Alkaline Phosphatase 93 40 - 129 U/L    ALT 13 10 - 40 U/L    AST 20 15 - 37 U/L   CBC with Auto Differential   Result Value Ref Range    WBC 6.6 4.0 - 10.5 k/uL    RBC 4.60 4.20 - 5.40 m/uL    Hemoglobin 13.5 12.5 - 16.0 g/dL    Hematocrit 41.3 37.0 - 47.0 %    MCV 89.8 78.0 - 100.0 fL    MCH 29.3 27.0 - 31.0 pg

## 2024-12-10 LAB
MICROORGANISM SPEC CULT: NORMAL
SPECIMEN DESCRIPTION: NORMAL

## 2024-12-10 NOTE — PROGRESS NOTES
Pharmacy Note  ED Culture Follow-up    Jazmyn Nesbitt is a 89 y.o. female.     Allergies: Codeine     Current antimicrobials:   Reviewed patient's urine culture (<50,000 CFU/mL mixed skin jesus) - culture is negative. Patient was appropriately discharged on no antimicrobial therapy. No further action needed.     Please call with any questions. Ext. [phone]    Lyn Cronin PharmD Candidate [id number]  12/10/2024 9:15 AM